# Patient Record
Sex: MALE | Race: WHITE | NOT HISPANIC OR LATINO | Employment: OTHER | URBAN - METROPOLITAN AREA
[De-identification: names, ages, dates, MRNs, and addresses within clinical notes are randomized per-mention and may not be internally consistent; named-entity substitution may affect disease eponyms.]

---

## 2012-03-14 LAB
CREAT SERPL-MCNC: 0.89 MG/DL (ref 0.72–1.25)
GLUCOSE BLD-MCNC: 95 MG/DL (ref 65–100)

## 2012-08-14 LAB
CREAT SERPL-MCNC: 0.94 MG/DL (ref 0.72–1.25)
GLUCOSE BLD-MCNC: 99 MG/DL (ref 65–100)

## 2017-01-23 ENCOUNTER — HOSPITAL ENCOUNTER (OUTPATIENT)
Dept: NUCLEAR MEDICINE | Facility: CLINIC | Age: 82
Discharge: HOME | End: 2017-01-23
Attending: INTERNAL MEDICINE

## 2017-01-23 ENCOUNTER — HOSPITAL ENCOUNTER (OUTPATIENT)
Dept: CARDIOLOGY | Facility: CLINIC | Age: 82
Discharge: HOME | End: 2017-01-23
Attending: INTERNAL MEDICINE

## 2017-01-23 DIAGNOSIS — I34.0 MR (MITRAL REGURGITATION): ICD-10-CM

## 2017-01-23 DIAGNOSIS — I25.10 ATHEROSCLEROSIS OF NATIVE CORONARY ARTERY OF NATIVE HEART WITHOUT ANGINA PECTORIS: ICD-10-CM

## 2017-01-23 DIAGNOSIS — I05.9 MITRAL VALVE DISORDER: ICD-10-CM

## 2017-01-23 DIAGNOSIS — I10 ESSENTIAL HYPERTENSION WITH GOAL BLOOD PRESSURE LESS THAN 140/90: ICD-10-CM

## 2017-01-23 LAB
CV STRESS CURRENT BP HE: NORMAL
CV STRESS CURRENT HR HE: 106
CV STRESS CURRENT HR HE: 106
CV STRESS CURRENT HR HE: 107
CV STRESS CURRENT HR HE: 114
CV STRESS CURRENT HR HE: 118
CV STRESS CURRENT HR HE: 118
CV STRESS CURRENT HR HE: 119
CV STRESS CURRENT HR HE: 121
CV STRESS CURRENT HR HE: 123
CV STRESS CURRENT HR HE: 126
CV STRESS CURRENT HR HE: 127
CV STRESS CURRENT HR HE: 129
CV STRESS CURRENT HR HE: 130
CV STRESS CURRENT HR HE: 77
CV STRESS CURRENT HR HE: 78
CV STRESS CURRENT HR HE: 78
CV STRESS CURRENT HR HE: 79
CV STRESS CURRENT HR HE: 82
CV STRESS CURRENT HR HE: 85
CV STRESS CURRENT HR HE: 86
CV STRESS CURRENT HR HE: 87
CV STRESS CURRENT HR HE: 94
CV STRESS CURRENT HR HE: 99
CV STRESS CURRENT HR HE: 99
CV STRESS DEVIATION TIME HE: NORMAL
CV STRESS EXERCISE STAGE HE: NORMAL
CV STRESS EXERCISE STAGE REACHED HE: NORMAL
CV STRESS FINAL RESTING BP HE: NORMAL
CV STRESS FINAL RESTING HR HE: 78
CV STRESS MAX HR HE: 130
CV STRESS MAX TREADMILL GRADE HE: 12
CV STRESS MAX TREADMILL SPEED HE: 2.5
CV STRESS PEAK DIA BP HE: NORMAL
CV STRESS PEAK SYS BP HE: NORMAL
CV STRESS PHASE HE: NORMAL
CV STRESS PROTOCOL HE: NORMAL
CV STRESS RESTING PT POSITION HE: NORMAL
CV STRESS RESTING PT POSITION HE: NORMAL
CV STRESS ST DEVIATION AMOUNT HE: NORMAL
CV STRESS ST DEVIATION ELEVATION HE: NORMAL
CV STRESS ST EVELATION AMOUNT HE: NORMAL
CV STRESS TEST TYPE HE: NORMAL
CV STRESS TOTAL STAGE TIME MIN 1 HE: NORMAL
NUC STRESS EJECTION FRACTION: 41 %
STRESS ECHO BASELINE BP: NORMAL
STRESS ECHO BASELINE HR: 80
STRESS ECHO CALCULATED PERCENT HR: 93
STRESS ECHO LAST STRESS BP: NORMAL
STRESS ECHO LAST STRESS HR: 129
STRESS ECHO POST ESTIMATED WORKLOAD: 6.8
STRESS ECHO POST EXERCISE DUR MIN: 4
STRESS ECHO POST EXERCISE DUR SEC: 52
STRESS ECHO TARGET HR: 118

## 2017-01-23 ASSESSMENT — MIFFLIN-ST. JEOR: SCORE: 1498.65

## 2017-01-24 LAB
AORTIC ROOT: 3.6 CM
AORTIC VALVE MEAN VELOCITY: 93.4 CM/S
AV DIMENSIONLESS INDEX VTI: 0.5
AV MEAN GRADIENT: 4 MMHG
AV PEAK GRADIENT: 7.8 MMHG
AV VALVE AREA: 1.9 CM2
AV VELOCITY RATIO: 0.6
BSA FOR ECHO PROCEDURE: 2 M2
CV BLOOD PRESSURE: NORMAL MMHG
CV ECHO HEIGHT: 68 IN
CV ECHO WEIGHT: 185 LBS
DOP CALC AO PEAK VEL: 140 CM/S
DOP CALC AO VTI: 27.8 CM
DOP CALC LVOT AREA: 3.8 CM2
DOP CALC LVOT DIAMETER: 2.2 CM
DOP CALC LVOT PEAK VEL: 78.7 CM/S
DOP CALC LVOT STROKE VOLUME: 54 CM3
DOP CALCLVOT PEAK VEL VTI: 14.2 CM
EJECTION FRACTION: 38 % (ref 55–75)
FRACTIONAL SHORTENING: 26.3 % (ref 28–44)
INTERVENTRICULAR SEPTUM IN END DIASTOLE: 1.7 CM (ref 0.6–1)
IVS/PW RATIO: 1.7
LA AREA 1: 27.2 CM2
LA AREA 2: 30.3 CM2
LEFT ATRIUM LENGTH: 6.05 CM
LEFT ATRIUM SIZE: 4.1 CM
LEFT ATRIUM VOLUME INDEX: 57.9 ML/M2
LEFT ATRIUM VOLUME: 115.8 CM3
LEFT VENTRICLE CARDIAC INDEX: 1.9 L/MIN/M2
LEFT VENTRICLE CARDIAC OUTPUT: 3.9 L/MIN
LEFT VENTRICLE DIASTOLIC VOLUME INDEX: 54 CM3/M2 (ref 34–74)
LEFT VENTRICLE DIASTOLIC VOLUME: 108 CM3 (ref 62–150)
LEFT VENTRICLE HEART RATE: 72 BPM
LEFT VENTRICLE MASS INDEX: 169.8 G/M2
LEFT VENTRICLE SYSTOLIC VOLUME INDEX: 33.5 CM3/M2 (ref 11–31)
LEFT VENTRICLE SYSTOLIC VOLUME: 67 CM3 (ref 21–61)
LEFT VENTRICULAR INTERNAL DIMENSION IN DIASTOLE: 5.7 CM (ref 4.2–5.8)
LEFT VENTRICULAR INTERNAL DIMENSION IN SYSTOLE: 4.2 CM (ref 2.5–4)
LEFT VENTRICULAR MASS: 339.6 G
LEFT VENTRICULAR OUTFLOW TRACT MEAN GRADIENT: 1 MMHG
LEFT VENTRICULAR OUTFLOW TRACT MEAN VELOCITY: 45.4 CM/S
LEFT VENTRICULAR OUTFLOW TRACT PEAK GRADIENT: 2 MMHG
LEFT VENTRICULAR POSTERIOR WALL IN END DIASTOLE: 1 CM (ref 0.6–1)
LV STROKE VOLUME INDEX: 27 ML/M2
MITRAL REGURGITANT VELOCITY TIME INTEGRAL: 188 CM
MR FLOW: 39 CM3
MR MEAN GRADIENT: 76 MMHG
MR MEAN VELOCITY: 404 CM/S
MR PEAK GRADIENT: 120.1 MMHG
MR PISA EROA: 0.2 CM2
MR PISA RADIUS: 0.9 CM
MR PISA VN NYQUIST: 23.1 CM/S
MV E'TISSUE VEL-LAT: 9.85 CM/S
MV E'TISSUE VEL-MED: 5.17 CM/S
MV PEAK A VELOCITY: 11.4 CM/S
MV REGURGITANT VOLUME: 40.3 CC
NUC REST DIASTOLIC VOLUME INDEX: 2960 LBS
NUC REST SYSTOLIC VOLUME INDEX: 68 IN
PISA MR PEAK VEL: 548 CM/S
PR MAX PG: 7 MMHG
PR PEAK VELOCITY: 138 CM/S
TRICUSPID REGURGITATION PEAK PRESSURE GRADIENT: 21.9 MMHG
TRICUSPID VALVE PEAK REGURGITANT VELOCITY: 234 CM/S

## 2017-01-26 ENCOUNTER — AMBULATORY - HEALTHEAST (OUTPATIENT)
Dept: CARDIOLOGY | Facility: CLINIC | Age: 82
End: 2017-01-26

## 2017-01-26 DIAGNOSIS — I05.0 MITRAL STENOSIS: ICD-10-CM

## 2017-02-01 ENCOUNTER — AMBULATORY - HEALTHEAST (OUTPATIENT)
Dept: CARDIOLOGY | Facility: CLINIC | Age: 82
End: 2017-02-01

## 2017-02-01 ENCOUNTER — OFFICE VISIT - HEALTHEAST (OUTPATIENT)
Dept: CARDIOLOGY | Facility: TELEHEALTH | Age: 82
End: 2017-02-01

## 2017-02-01 ENCOUNTER — COMMUNICATION - HEALTHEAST (OUTPATIENT)
Dept: CARDIOLOGY | Facility: CLINIC | Age: 82
End: 2017-02-01

## 2017-02-01 DIAGNOSIS — I05.9 MITRAL VALVE DISORDER: ICD-10-CM

## 2017-02-01 DIAGNOSIS — I10 ESSENTIAL HYPERTENSION WITH GOAL BLOOD PRESSURE LESS THAN 130/85: ICD-10-CM

## 2017-02-01 DIAGNOSIS — I25.10 ATHEROSCLEROSIS OF NATIVE CORONARY ARTERY OF NATIVE HEART WITHOUT ANGINA PECTORIS: ICD-10-CM

## 2017-02-01 DIAGNOSIS — I05.0 MITRAL STENOSIS: ICD-10-CM

## 2017-02-01 DIAGNOSIS — I25.10 CAD (CORONARY ARTERY DISEASE): ICD-10-CM

## 2017-02-01 ASSESSMENT — MIFFLIN-ST. JEOR: SCORE: 1491.86

## 2017-03-02 ENCOUNTER — COMMUNICATION - HEALTHEAST (OUTPATIENT)
Dept: CARDIOLOGY | Facility: CLINIC | Age: 82
End: 2017-03-02

## 2017-03-03 ENCOUNTER — OFFICE VISIT - RIVER FALLS (OUTPATIENT)
Dept: FAMILY MEDICINE | Facility: CLINIC | Age: 82
End: 2017-03-03
Payer: MEDICARE

## 2017-03-03 ENCOUNTER — SURGERY - HEALTHEAST (OUTPATIENT)
Dept: CARDIOLOGY | Facility: CLINIC | Age: 82
End: 2017-03-03

## 2017-03-03 ASSESSMENT — MIFFLIN-ST. JEOR: SCORE: 1512.71

## 2017-03-06 ENCOUNTER — SURGERY - HEALTHEAST (OUTPATIENT)
Dept: CARDIOLOGY | Facility: CLINIC | Age: 82
End: 2017-03-06

## 2017-03-06 ENCOUNTER — AMBULATORY - HEALTHEAST (OUTPATIENT)
Dept: CARDIOLOGY | Facility: CLINIC | Age: 82
End: 2017-03-06

## 2017-03-06 ASSESSMENT — MIFFLIN-ST. JEOR: SCORE: 1543.38

## 2017-03-08 ENCOUNTER — COMMUNICATION - HEALTHEAST (OUTPATIENT)
Dept: CARDIOLOGY | Facility: CLINIC | Age: 82
End: 2017-03-08

## 2017-03-10 ENCOUNTER — COMMUNICATION - HEALTHEAST (OUTPATIENT)
Dept: CARDIOLOGY | Facility: CLINIC | Age: 82
End: 2017-03-10

## 2017-03-10 DIAGNOSIS — I25.10 CAD (CORONARY ARTERY DISEASE): ICD-10-CM

## 2017-03-13 ENCOUNTER — AMBULATORY - HEALTHEAST (OUTPATIENT)
Dept: CARDIOLOGY | Facility: CLINIC | Age: 82
End: 2017-03-13

## 2017-03-13 DIAGNOSIS — I34.0 MITRAL REGURGITATION: ICD-10-CM

## 2017-03-13 DIAGNOSIS — I25.10 CAD (CORONARY ARTERY DISEASE): ICD-10-CM

## 2017-03-16 ENCOUNTER — AMBULATORY - HEALTHEAST (OUTPATIENT)
Dept: CARDIOLOGY | Facility: CLINIC | Age: 82
End: 2017-03-16

## 2017-03-23 ENCOUNTER — SURGERY - HEALTHEAST (OUTPATIENT)
Dept: CARDIOLOGY | Facility: CLINIC | Age: 82
End: 2017-03-23

## 2017-03-24 ENCOUNTER — COMMUNICATION - HEALTHEAST (OUTPATIENT)
Dept: CARDIOLOGY | Facility: CLINIC | Age: 82
End: 2017-03-24

## 2017-03-24 ENCOUNTER — RECORDS - HEALTHEAST (OUTPATIENT)
Dept: ADMINISTRATIVE | Facility: OTHER | Age: 82
End: 2017-03-24

## 2017-03-27 ENCOUNTER — RECORDS - HEALTHEAST (OUTPATIENT)
Dept: ADMINISTRATIVE | Facility: OTHER | Age: 82
End: 2017-03-27

## 2017-03-29 ENCOUNTER — COMMUNICATION - HEALTHEAST (OUTPATIENT)
Dept: CARDIOLOGY | Facility: CLINIC | Age: 82
End: 2017-03-29

## 2017-03-31 ENCOUNTER — OFFICE VISIT - RIVER FALLS (OUTPATIENT)
Dept: FAMILY MEDICINE | Facility: CLINIC | Age: 82
End: 2017-03-31

## 2017-03-31 ASSESSMENT — MIFFLIN-ST. JEOR: SCORE: 1529.94

## 2017-04-03 ENCOUNTER — OFFICE VISIT - RIVER FALLS (OUTPATIENT)
Dept: FAMILY MEDICINE | Facility: CLINIC | Age: 82
End: 2017-04-03

## 2017-04-12 ENCOUNTER — OFFICE VISIT - HEALTHEAST (OUTPATIENT)
Dept: CARDIOLOGY | Facility: TELEHEALTH | Age: 82
End: 2017-04-12

## 2017-04-12 DIAGNOSIS — I05.9 MITRAL VALVE DISORDER: ICD-10-CM

## 2017-04-12 DIAGNOSIS — I25.10 CORONARY ARTERY DISEASE INVOLVING NATIVE CORONARY ARTERY OF NATIVE HEART WITHOUT ANGINA PECTORIS: ICD-10-CM

## 2017-04-12 ASSESSMENT — MIFFLIN-ST. JEOR: SCORE: 1522.24

## 2017-04-17 ENCOUNTER — COMMUNICATION - HEALTHEAST (OUTPATIENT)
Dept: CARDIOLOGY | Facility: CLINIC | Age: 82
End: 2017-04-17

## 2017-06-19 ENCOUNTER — OFFICE VISIT - RIVER FALLS (OUTPATIENT)
Dept: FAMILY MEDICINE | Facility: CLINIC | Age: 82
End: 2017-06-19

## 2017-06-22 ENCOUNTER — OFFICE VISIT - RIVER FALLS (OUTPATIENT)
Dept: FAMILY MEDICINE | Facility: CLINIC | Age: 82
End: 2017-06-22

## 2017-06-22 ASSESSMENT — MIFFLIN-ST. JEOR: SCORE: 1516.33

## 2017-06-23 LAB
CREAT SERPL-MCNC: 1.13 MG/DL (ref 0.7–1.11)
GLUCOSE BLD-MCNC: 97 MG/DL (ref 65–99)

## 2017-06-27 ENCOUNTER — AMBULATORY - HEALTHEAST (OUTPATIENT)
Dept: CARDIOLOGY | Facility: CLINIC | Age: 82
End: 2017-06-27

## 2017-06-28 ENCOUNTER — AMBULATORY - HEALTHEAST (OUTPATIENT)
Dept: CARDIOLOGY | Facility: CLINIC | Age: 82
End: 2017-06-28

## 2017-06-28 ENCOUNTER — OFFICE VISIT - HEALTHEAST (OUTPATIENT)
Dept: CARDIOLOGY | Facility: TELEHEALTH | Age: 82
End: 2017-06-28

## 2017-06-28 DIAGNOSIS — I10 ESSENTIAL HYPERTENSION WITH GOAL BLOOD PRESSURE LESS THAN 140/90: ICD-10-CM

## 2017-06-28 DIAGNOSIS — I05.9 MITRAL VALVE DISORDER: ICD-10-CM

## 2017-06-28 DIAGNOSIS — I25.10 CORONARY ARTERY DISEASE INVOLVING NATIVE CORONARY ARTERY OF NATIVE HEART WITHOUT ANGINA PECTORIS: ICD-10-CM

## 2017-06-28 ASSESSMENT — MIFFLIN-ST. JEOR: SCORE: 1516.8

## 2017-06-29 ENCOUNTER — COMMUNICATION - HEALTHEAST (OUTPATIENT)
Dept: CARDIOLOGY | Facility: CLINIC | Age: 82
End: 2017-06-29

## 2017-07-10 ENCOUNTER — COMMUNICATION - HEALTHEAST (OUTPATIENT)
Dept: CARDIOLOGY | Facility: CLINIC | Age: 82
End: 2017-07-10

## 2017-07-24 ENCOUNTER — OFFICE VISIT - HEALTHEAST (OUTPATIENT)
Dept: CARDIOLOGY | Facility: CLINIC | Age: 82
End: 2017-07-24

## 2017-07-24 DIAGNOSIS — I34.0 SEVERE MITRAL REGURGITATION: ICD-10-CM

## 2017-07-24 ASSESSMENT — MIFFLIN-ST. JEOR: SCORE: 1512.26

## 2017-08-28 ENCOUNTER — OFFICE VISIT - RIVER FALLS (OUTPATIENT)
Dept: FAMILY MEDICINE | Facility: CLINIC | Age: 82
End: 2017-08-28

## 2017-08-28 ASSESSMENT — MIFFLIN-ST. JEOR: SCORE: 1516.33

## 2017-08-29 LAB
CREAT SERPL-MCNC: 1.02 MG/DL
GLUCOSE BLD-MCNC: 97 MG/DL

## 2017-09-05 ENCOUNTER — OFFICE VISIT - RIVER FALLS (OUTPATIENT)
Dept: FAMILY MEDICINE | Facility: CLINIC | Age: 82
End: 2017-09-05
Payer: MEDICARE

## 2017-09-05 ASSESSMENT — MIFFLIN-ST. JEOR: SCORE: 1527.22

## 2018-01-03 ENCOUNTER — COMMUNICATION - HEALTHEAST (OUTPATIENT)
Dept: CARDIOLOGY | Facility: CLINIC | Age: 83
End: 2018-01-03

## 2018-01-18 ENCOUNTER — COMMUNICATION - RIVER FALLS (OUTPATIENT)
Dept: FAMILY MEDICINE | Facility: CLINIC | Age: 83
End: 2018-01-18

## 2018-01-18 ENCOUNTER — OFFICE VISIT - RIVER FALLS (OUTPATIENT)
Dept: FAMILY MEDICINE | Facility: CLINIC | Age: 83
End: 2018-01-18

## 2018-01-19 LAB
CREAT SERPL-MCNC: 1.09 MG/DL (ref 0.7–1.11)
GLUCOSE BLD-MCNC: 99 MG/DL (ref 65–99)

## 2018-01-26 ENCOUNTER — AMBULATORY - RIVER FALLS (OUTPATIENT)
Dept: FAMILY MEDICINE | Facility: CLINIC | Age: 83
End: 2018-01-26

## 2018-02-01 ENCOUNTER — AMBULATORY - RIVER FALLS (OUTPATIENT)
Dept: FAMILY MEDICINE | Facility: CLINIC | Age: 83
End: 2018-02-01

## 2018-02-13 ENCOUNTER — OFFICE VISIT - RIVER FALLS (OUTPATIENT)
Dept: FAMILY MEDICINE | Facility: CLINIC | Age: 83
End: 2018-02-13

## 2018-02-13 ASSESSMENT — MIFFLIN-ST. JEOR: SCORE: 1539.01

## 2018-02-14 LAB
CREAT SERPL-MCNC: 0.97 MG/DL (ref 0.7–1.11)
GLUCOSE BLD-MCNC: 98 MG/DL (ref 65–99)

## 2018-02-28 ENCOUNTER — OFFICE VISIT - RIVER FALLS (OUTPATIENT)
Dept: FAMILY MEDICINE | Facility: CLINIC | Age: 83
End: 2018-02-28

## 2018-02-28 ENCOUNTER — AMBULATORY - RIVER FALLS (OUTPATIENT)
Dept: FAMILY MEDICINE | Facility: CLINIC | Age: 83
End: 2018-02-28

## 2018-02-28 ASSESSMENT — MIFFLIN-ST. JEOR: SCORE: 1552.62

## 2018-03-06 ENCOUNTER — OFFICE VISIT - RIVER FALLS (OUTPATIENT)
Dept: FAMILY MEDICINE | Facility: CLINIC | Age: 83
End: 2018-03-06

## 2018-03-06 ASSESSMENT — MIFFLIN-ST. JEOR: SCORE: 1557.16

## 2018-03-08 LAB — CREAT SERPL-MCNC: 1.21 MG/DL (ref 0.7–1.11)

## 2018-03-29 ENCOUNTER — OFFICE VISIT - RIVER FALLS (OUTPATIENT)
Dept: FAMILY MEDICINE | Facility: CLINIC | Age: 83
End: 2018-03-29

## 2018-03-29 ASSESSMENT — MIFFLIN-ST. JEOR: SCORE: 1543.55

## 2018-04-16 ENCOUNTER — OFFICE VISIT - RIVER FALLS (OUTPATIENT)
Dept: FAMILY MEDICINE | Facility: CLINIC | Age: 83
End: 2018-04-16

## 2018-04-16 ASSESSMENT — MIFFLIN-ST. JEOR: SCORE: 1507.26

## 2018-04-20 ENCOUNTER — OFFICE VISIT - RIVER FALLS (OUTPATIENT)
Dept: FAMILY MEDICINE | Facility: CLINIC | Age: 83
End: 2018-04-20

## 2018-04-20 ASSESSMENT — MIFFLIN-ST. JEOR: SCORE: 1498.19

## 2018-05-11 ENCOUNTER — OFFICE VISIT - RIVER FALLS (OUTPATIENT)
Dept: FAMILY MEDICINE | Facility: CLINIC | Age: 83
End: 2018-05-11

## 2018-05-11 LAB
CREAT SERPL-MCNC: 1.28 MG/DL (ref 0.72–1.25)
GLUCOSE BLD-MCNC: 116 MG/DL (ref 65–100)

## 2018-05-11 ASSESSMENT — MIFFLIN-ST. JEOR: SCORE: 1507.26

## 2018-05-18 ENCOUNTER — OFFICE VISIT - RIVER FALLS (OUTPATIENT)
Dept: FAMILY MEDICINE | Facility: CLINIC | Age: 83
End: 2018-05-18

## 2018-05-18 ASSESSMENT — MIFFLIN-ST. JEOR: SCORE: 1502.73

## 2018-06-08 LAB
CREAT SERPL-MCNC: 1.2 MG/DL
GLUCOSE BLD-MCNC: 101 MG/DL

## 2018-08-01 ENCOUNTER — RECORDS - HEALTHEAST (OUTPATIENT)
Dept: ADMINISTRATIVE | Facility: OTHER | Age: 83
End: 2018-08-01

## 2018-08-01 ENCOUNTER — AMBULATORY - HEALTHEAST (OUTPATIENT)
Dept: CARDIOLOGY | Facility: CLINIC | Age: 83
End: 2018-08-01

## 2018-08-08 ENCOUNTER — OFFICE VISIT - HEALTHEAST (OUTPATIENT)
Dept: CARDIOLOGY | Facility: TELEHEALTH | Age: 83
End: 2018-08-08

## 2018-08-08 DIAGNOSIS — I25.10 CORONARY ARTERY DISEASE INVOLVING NATIVE CORONARY ARTERY OF NATIVE HEART WITHOUT ANGINA PECTORIS: ICD-10-CM

## 2018-08-08 DIAGNOSIS — I10 ESSENTIAL HYPERTENSION: ICD-10-CM

## 2018-08-08 DIAGNOSIS — I34.0 SEVERE MITRAL REGURGITATION: ICD-10-CM

## 2018-08-08 DIAGNOSIS — I05.9 MITRAL VALVE DISORDER: ICD-10-CM

## 2018-08-08 ASSESSMENT — MIFFLIN-ST. JEOR: SCORE: 1516.8

## 2018-08-28 ENCOUNTER — OFFICE VISIT - RIVER FALLS (OUTPATIENT)
Dept: FAMILY MEDICINE | Facility: CLINIC | Age: 83
End: 2018-08-28

## 2018-08-28 LAB
CREAT SERPL-MCNC: 1.2 MG/DL (ref 0.72–1.25)
GLUCOSE BLD-MCNC: 94 MG/DL (ref 65–100)

## 2018-08-28 ASSESSMENT — MIFFLIN-ST. JEOR: SCORE: 1525.41

## 2018-09-17 ENCOUNTER — OFFICE VISIT - RIVER FALLS (OUTPATIENT)
Dept: FAMILY MEDICINE | Facility: CLINIC | Age: 83
End: 2018-09-17

## 2018-09-18 ENCOUNTER — OFFICE VISIT - RIVER FALLS (OUTPATIENT)
Dept: FAMILY MEDICINE | Facility: CLINIC | Age: 83
End: 2018-09-18

## 2018-09-18 ASSESSMENT — MIFFLIN-ST. JEOR: SCORE: 1520.87

## 2018-09-25 ENCOUNTER — RECORDS - HEALTHEAST (OUTPATIENT)
Dept: ADMINISTRATIVE | Facility: OTHER | Age: 83
End: 2018-09-25

## 2018-09-25 ENCOUNTER — AMBULATORY - HEALTHEAST (OUTPATIENT)
Dept: CARDIOLOGY | Facility: CLINIC | Age: 83
End: 2018-09-25

## 2018-09-26 ENCOUNTER — OFFICE VISIT - HEALTHEAST (OUTPATIENT)
Dept: CARDIOLOGY | Facility: TELEHEALTH | Age: 83
End: 2018-09-26

## 2018-09-26 DIAGNOSIS — I10 ESSENTIAL HYPERTENSION: ICD-10-CM

## 2018-09-26 DIAGNOSIS — I34.0 SEVERE MITRAL REGURGITATION: ICD-10-CM

## 2018-09-26 DIAGNOSIS — I05.9 MITRAL VALVE DISORDER: ICD-10-CM

## 2018-09-26 DIAGNOSIS — I25.10 CORONARY ARTERY DISEASE INVOLVING NATIVE CORONARY ARTERY OF NATIVE HEART WITHOUT ANGINA PECTORIS: ICD-10-CM

## 2018-09-26 ASSESSMENT — MIFFLIN-ST. JEOR: SCORE: 1548.55

## 2018-10-10 ENCOUNTER — COMMUNICATION - HEALTHEAST (OUTPATIENT)
Dept: ADMINISTRATIVE | Facility: CLINIC | Age: 83
End: 2018-10-10

## 2018-11-19 ENCOUNTER — OFFICE VISIT - RIVER FALLS (OUTPATIENT)
Dept: FAMILY MEDICINE | Facility: CLINIC | Age: 83
End: 2018-11-19

## 2018-12-26 ENCOUNTER — RECORDS - HEALTHEAST (OUTPATIENT)
Dept: ADMINISTRATIVE | Facility: OTHER | Age: 83
End: 2018-12-26

## 2019-01-01 ENCOUNTER — OFFICE VISIT - HEALTHEAST (OUTPATIENT)
Dept: CARDIOLOGY | Facility: CLINIC | Age: 84
End: 2019-01-01

## 2019-01-01 ENCOUNTER — HOSPITAL ENCOUNTER (OUTPATIENT)
Dept: RADIOLOGY | Facility: CLINIC | Age: 84
Discharge: HOME | End: 2019-12-16

## 2019-01-01 ENCOUNTER — AMBULATORY - HEALTHEAST (OUTPATIENT)
Dept: CARDIOLOGY | Facility: CLINIC | Age: 84
End: 2019-01-01

## 2019-01-01 ENCOUNTER — AMBULATORY - HEALTHEAST (OUTPATIENT)
Dept: CARDIAC REHAB | Facility: CLINIC | Age: 84
End: 2019-01-01

## 2019-01-01 ENCOUNTER — AMBULATORY - RIVER FALLS (OUTPATIENT)
Dept: FAMILY MEDICINE | Facility: CLINIC | Age: 84
End: 2019-01-01

## 2019-01-01 ENCOUNTER — ANESTHESIA - HEALTHEAST (OUTPATIENT)
Dept: CARDIOLOGY | Facility: CLINIC | Age: 84
End: 2019-01-01

## 2019-01-01 ENCOUNTER — COMMUNICATION - RIVER FALLS (OUTPATIENT)
Dept: FAMILY MEDICINE | Facility: CLINIC | Age: 84
End: 2019-01-01

## 2019-01-01 ENCOUNTER — SURGERY - HEALTHEAST (OUTPATIENT)
Dept: CARDIOLOGY | Facility: CLINIC | Age: 84
End: 2019-01-01

## 2019-01-01 ENCOUNTER — COMMUNICATION - HEALTHEAST (OUTPATIENT)
Dept: CARDIOLOGY | Facility: CLINIC | Age: 84
End: 2019-01-01

## 2019-01-01 DIAGNOSIS — I50.22 CHRONIC SYSTOLIC CONGESTIVE HEART FAILURE (H): ICD-10-CM

## 2019-01-01 DIAGNOSIS — I48.19 PERSISTENT ATRIAL FIBRILLATION (H): ICD-10-CM

## 2019-01-01 DIAGNOSIS — I34.0 SEVERE MITRAL REGURGITATION: ICD-10-CM

## 2019-01-01 DIAGNOSIS — I10 ESSENTIAL HYPERTENSION: ICD-10-CM

## 2019-01-01 DIAGNOSIS — I50.9 HEART FAILURE (H): ICD-10-CM

## 2019-01-01 DIAGNOSIS — I25.10 CORONARY ARTERY DISEASE INVOLVING NATIVE CORONARY ARTERY OF NATIVE HEART WITHOUT ANGINA PECTORIS: ICD-10-CM

## 2019-01-01 DIAGNOSIS — Z95.818 S/P MITRAL VALVE CLIP IMPLANTATION: ICD-10-CM

## 2019-01-01 DIAGNOSIS — I10 BENIGN ESSENTIAL HYPERTENSION: ICD-10-CM

## 2019-01-01 DIAGNOSIS — I50.43 CHF (CONGESTIVE HEART FAILURE), NYHA CLASS I, ACUTE ON CHRONIC, COMBINED (H): ICD-10-CM

## 2019-01-01 DIAGNOSIS — Z98.890 S/P MITRAL VALVE CLIP IMPLANTATION: ICD-10-CM

## 2019-01-01 LAB
ABO/RH(D): NORMAL
ALBUMIN SERPL-MCNC: 3.5 G/DL (ref 3.5–5)
ALP SERPL-CCNC: 71 U/L (ref 45–120)
ALT SERPL W P-5'-P-CCNC: 19 U/L (ref 0–45)
ANION GAP SERPL CALCULATED.3IONS-SCNC: 9 MMOL/L (ref 5–18)
ANTIBODY SCREEN: NEGATIVE
AST SERPL W P-5'-P-CCNC: 24 U/L (ref 0–40)
ATRIAL RATE - MUSE: 150 BPM
BACTERIA SPEC CULT: NORMAL
BASOPHILS # BLD AUTO: 0 THOU/UL (ref 0–0.2)
BASOPHILS NFR BLD AUTO: 1 % (ref 0–2)
BILIRUB SERPL-MCNC: 2.2 MG/DL (ref 0–1)
BNP SERPL-MCNC: 364 PG/ML (ref 0–93)
BUN SERPL-MCNC: 30 MG/DL (ref 8–28)
CALCIUM SERPL-MCNC: 9.3 MG/DL (ref 8.5–10.5)
CHLORIDE BLD-SCNC: 103 MMOL/L (ref 98–107)
CO2 SERPL-SCNC: 28 MMOL/L (ref 22–31)
CREAT SERPL-MCNC: 1.48 MG/DL (ref 0.7–1.3)
DIASTOLIC BLOOD PRESSURE - MUSE: NORMAL
EOSINOPHIL # BLD AUTO: 0.2 THOU/UL (ref 0–0.4)
EOSINOPHIL NFR BLD AUTO: 3 % (ref 0–6)
ERYTHROCYTE [DISTWIDTH] IN BLOOD BY AUTOMATED COUNT: 14.7 % (ref 11–14.5)
GFR SERPL CREATININE-BSD FRML MDRD: 45 ML/MIN/1.73M2
GLUCOSE BLD-MCNC: 138 MG/DL (ref 70–125)
HCT VFR BLD AUTO: 40.9 % (ref 40–54)
HGB BLD-MCNC: 13 G/DL (ref 14–18)
INR PPP: 1.3
INR PPP: 1.51 (ref 0.9–1.1)
INR PPP: 1.8
INTERPRETATION ECG - MUSE: NORMAL
LYMPHOCYTES # BLD AUTO: 1 THOU/UL (ref 0.8–4.4)
LYMPHOCYTES NFR BLD AUTO: 19 % (ref 20–40)
MAGNESIUM SERPL-MCNC: 2.1 MG/DL (ref 1.8–2.6)
MCH RBC QN AUTO: 30.7 PG (ref 27–34)
MCHC RBC AUTO-ENTMCNC: 31.8 G/DL (ref 32–36)
MCV RBC AUTO: 97 FL (ref 80–100)
MONOCYTES # BLD AUTO: 0.6 THOU/UL (ref 0–0.9)
MONOCYTES NFR BLD AUTO: 11 % (ref 2–10)
NEUTROPHILS # BLD AUTO: 3.5 THOU/UL (ref 2–7.7)
NEUTROPHILS NFR BLD AUTO: 65 % (ref 50–70)
P AXIS - MUSE: NORMAL
PLATELET # BLD AUTO: 163 THOU/UL (ref 140–440)
PMV BLD AUTO: 10.3 FL (ref 8.5–12.5)
POTASSIUM BLD-SCNC: 4.3 MMOL/L (ref 3.5–5)
PR INTERVAL - MUSE: NORMAL
PROT SERPL-MCNC: 7.5 G/DL (ref 6–8)
QRS DURATION - MUSE: 112 MS
QT - MUSE: 408 MS
QTC - MUSE: 499 MS
R AXIS - MUSE: -50 DEGREES
RBC # BLD AUTO: 4.23 MILL/UL (ref 4.4–6.2)
SODIUM SERPL-SCNC: 140 MMOL/L (ref 136–145)
SYSTOLIC BLOOD PRESSURE - MUSE: NORMAL
T AXIS - MUSE: 75 DEGREES
VENTRICULAR RATE- MUSE: 90 BPM
WBC: 5.3 THOU/UL (ref 4–11)

## 2019-01-01 ASSESSMENT — MIFFLIN-ST. JEOR
SCORE: 1507.73
SCORE: 1508.63
SCORE: 1487.77
SCORE: 1487.77
SCORE: 1490.94

## 2019-01-02 ENCOUNTER — OFFICE VISIT - HEALTHEAST (OUTPATIENT)
Dept: CARDIOLOGY | Facility: TELEHEALTH | Age: 84
End: 2019-01-02

## 2019-01-02 DIAGNOSIS — I25.10 CORONARY ARTERY DISEASE INVOLVING NATIVE CORONARY ARTERY OF NATIVE HEART WITHOUT ANGINA PECTORIS: ICD-10-CM

## 2019-01-02 DIAGNOSIS — I34.0 SEVERE MITRAL REGURGITATION: ICD-10-CM

## 2019-01-02 DIAGNOSIS — I10 ESSENTIAL HYPERTENSION: ICD-10-CM

## 2019-01-02 ASSESSMENT — MIFFLIN-ST. JEOR: SCORE: 1562.16

## 2019-01-03 ENCOUNTER — AMBULATORY - RIVER FALLS (OUTPATIENT)
Dept: FAMILY MEDICINE | Facility: CLINIC | Age: 84
End: 2019-01-03

## 2019-01-03 ENCOUNTER — COMMUNICATION - HEALTHEAST (OUTPATIENT)
Dept: CARDIOLOGY | Facility: CLINIC | Age: 84
End: 2019-01-03

## 2019-01-03 DIAGNOSIS — I34.0 SEVERE MITRAL INSUFFICIENCY: ICD-10-CM

## 2019-01-03 DIAGNOSIS — I50.9 CHF (CONGESTIVE HEART FAILURE) (H): ICD-10-CM

## 2019-01-03 DIAGNOSIS — R06.02 SOB (SHORTNESS OF BREATH): ICD-10-CM

## 2019-01-04 LAB
BASOPHILS # BLD MANUAL: 38 10*3/UL (ref 0–200)
BASOPHILS NFR BLD MANUAL: 0.6 %
BNP SERPL-MCNC: 551 PG/ML
BUN SERPL-MCNC: 20 MG/DL (ref 7–25)
BUN/CREAT RATIO - HISTORICAL: 17 (ref 6–22)
CALCIUM SERPL-MCNC: 9.2 MG/DL (ref 8.6–10.3)
CHLORIDE BLD-SCNC: 102 MMOL/L (ref 98–110)
CO2 SERPL-SCNC: 32 MMOL/L (ref 20–32)
CREAT SERPL-MCNC: 1.16 MG/DL (ref 0.7–1.11)
EGFRCR SERPLBLD CKD-EPI 2021: 58 ML/MIN/1.73M2
EOSINOPHIL # BLD MANUAL: 290 10*3/UL (ref 15–500)
EOSINOPHIL NFR BLD MANUAL: 4.6 %
ERYTHROCYTE [DISTWIDTH] IN BLOOD BY AUTOMATED COUNT: 12.1 % (ref 11–15)
GLUCOSE BLD-MCNC: 105 MG/DL (ref 65–99)
HCT VFR BLD AUTO: 41.4 % (ref 38.5–50)
HGB BLD-MCNC: 14.5 GM/DL (ref 13.2–17.1)
LYMPHOCYTES # BLD MANUAL: 1128 10*3/UL (ref 850–3900)
LYMPHOCYTES NFR BLD MANUAL: 17.9 %
MCH RBC QN AUTO: 32.2 PG (ref 27–33)
MCHC RBC AUTO-ENTMCNC: 35 GM/DL (ref 32–36)
MCV RBC AUTO: 92 FL (ref 80–100)
MONOCYTES # BLD MANUAL: 838 10*3/UL (ref 200–950)
MONOCYTES NFR BLD MANUAL: 13.3 %
NEUTROPHILS # BLD MANUAL: 4007 10*3/UL (ref 1500–7800)
NEUTROPHILS NFR BLD MANUAL: 63.6 %
PLATELET # BLD AUTO: 191 10*3/UL (ref 140–400)
PMV BLD: 10.6 FL (ref 7.5–12.5)
POTASSIUM BLD-SCNC: 4 MMOL/L (ref 3.5–5.3)
RBC # BLD AUTO: 4.5 10*6/UL (ref 4.2–5.8)
SODIUM SERPL-SCNC: 140 MMOL/L (ref 135–146)
WBC # BLD AUTO: 6.3 10*3/UL (ref 3.8–10.8)

## 2019-01-09 ENCOUNTER — COMMUNICATION - HEALTHEAST (OUTPATIENT)
Dept: CARDIOLOGY | Facility: CLINIC | Age: 84
End: 2019-01-09

## 2019-01-09 DIAGNOSIS — I25.10 CAD (CORONARY ARTERY DISEASE): ICD-10-CM

## 2019-01-10 ENCOUNTER — RECORDS - HEALTHEAST (OUTPATIENT)
Dept: CARDIOLOGY | Facility: CLINIC | Age: 84
End: 2019-01-10

## 2019-01-10 ENCOUNTER — AMBULATORY - HEALTHEAST (OUTPATIENT)
Dept: CARDIOLOGY | Facility: CLINIC | Age: 84
End: 2019-01-10

## 2019-01-15 ENCOUNTER — OFFICE VISIT - RIVER FALLS (OUTPATIENT)
Dept: FAMILY MEDICINE | Facility: CLINIC | Age: 84
End: 2019-01-15

## 2019-01-15 ASSESSMENT — MIFFLIN-ST. JEOR: SCORE: 1552.62

## 2019-01-21 ENCOUNTER — COMMUNICATION - HEALTHEAST (OUTPATIENT)
Dept: CARDIOLOGY | Facility: CLINIC | Age: 84
End: 2019-01-21

## 2019-01-21 DIAGNOSIS — R06.9 ABNORMALITY OF BREATHING: ICD-10-CM

## 2019-01-21 DIAGNOSIS — Z79.899 MEDICATION MANAGEMENT: ICD-10-CM

## 2019-02-05 ENCOUNTER — COMMUNICATION - RIVER FALLS (OUTPATIENT)
Dept: FAMILY MEDICINE | Facility: CLINIC | Age: 84
End: 2019-02-05

## 2019-02-05 ENCOUNTER — OFFICE VISIT - RIVER FALLS (OUTPATIENT)
Dept: FAMILY MEDICINE | Facility: CLINIC | Age: 84
End: 2019-02-05

## 2019-02-05 ASSESSMENT — MIFFLIN-ST. JEOR: SCORE: 1548.09

## 2019-02-06 ENCOUNTER — AMBULATORY - HEALTHEAST (OUTPATIENT)
Dept: CARDIOLOGY | Facility: CLINIC | Age: 84
End: 2019-02-06

## 2019-02-06 LAB
BASOPHILS # BLD MANUAL: 51 10*3/UL (ref 0–200)
BASOPHILS NFR BLD MANUAL: 1 %
BNP SERPL-MCNC: 350 PG/ML
BUN SERPL-MCNC: 17 MG/DL (ref 7–25)
BUN/CREAT RATIO - HISTORICAL: 14 (ref 6–22)
CALCIUM SERPL-MCNC: 9.2 MG/DL (ref 8.6–10.3)
CHLORIDE BLD-SCNC: 104 MMOL/L (ref 98–110)
CO2 SERPL-SCNC: 32 MMOL/L (ref 20–32)
CREAT SERPL-MCNC: 1.2 MG/DL (ref 0.7–1.11)
EGFRCR SERPLBLD CKD-EPI 2021: 56 ML/MIN/1.73M2
EOSINOPHIL # BLD MANUAL: 342 10*3/UL (ref 15–500)
EOSINOPHIL NFR BLD MANUAL: 6.7 %
ERYTHROCYTE [DISTWIDTH] IN BLOOD BY AUTOMATED COUNT: 12.6 % (ref 11–15)
GLUCOSE BLD-MCNC: 93 MG/DL (ref 65–99)
HCT VFR BLD AUTO: 40.2 % (ref 38.5–50)
HGB BLD-MCNC: 14.1 GM/DL (ref 13.2–17.1)
LYMPHOCYTES # BLD MANUAL: 974 10*3/UL (ref 850–3900)
LYMPHOCYTES NFR BLD MANUAL: 19.1 %
MCH RBC QN AUTO: 32.1 PG (ref 27–33)
MCHC RBC AUTO-ENTMCNC: 35.1 GM/DL (ref 32–36)
MCV RBC AUTO: 91.6 FL (ref 80–100)
MONOCYTES # BLD MANUAL: 796 10*3/UL (ref 200–950)
MONOCYTES NFR BLD MANUAL: 15.6 %
NEUTROPHILS # BLD MANUAL: 2938 10*3/UL (ref 1500–7800)
NEUTROPHILS NFR BLD MANUAL: 57.6 %
PLATELET # BLD AUTO: 172 10*3/UL (ref 140–400)
PMV BLD: 10.9 FL (ref 7.5–12.5)
POTASSIUM BLD-SCNC: 4.3 MMOL/L (ref 3.5–5.3)
RBC # BLD AUTO: 4.39 10*6/UL (ref 4.2–5.8)
SODIUM SERPL-SCNC: 140 MMOL/L (ref 135–146)
WBC # BLD AUTO: 5.1 10*3/UL (ref 3.8–10.8)

## 2019-02-08 ENCOUNTER — OFFICE VISIT - RIVER FALLS (OUTPATIENT)
Dept: FAMILY MEDICINE | Facility: CLINIC | Age: 84
End: 2019-02-08

## 2019-02-08 ASSESSMENT — MIFFLIN-ST. JEOR
SCORE: 1543.55
SCORE: 1543.55

## 2019-02-11 ENCOUNTER — COMMUNICATION - HEALTHEAST (OUTPATIENT)
Dept: CARDIOLOGY | Facility: CLINIC | Age: 84
End: 2019-02-11

## 2019-02-11 DIAGNOSIS — R06.02 SHORTNESS OF BREATH: ICD-10-CM

## 2019-02-11 DIAGNOSIS — I25.10 CORONARY ARTERY DISEASE INVOLVING NATIVE CORONARY ARTERY OF NATIVE HEART WITHOUT ANGINA PECTORIS: ICD-10-CM

## 2019-02-11 DIAGNOSIS — I10 ESSENTIAL HYPERTENSION: ICD-10-CM

## 2019-03-11 ENCOUNTER — OFFICE VISIT - RIVER FALLS (OUTPATIENT)
Dept: FAMILY MEDICINE | Facility: CLINIC | Age: 84
End: 2019-03-11

## 2019-03-14 ENCOUNTER — COMMUNICATION - HEALTHEAST (OUTPATIENT)
Dept: TELEHEALTH | Facility: CLINIC | Age: 84
End: 2019-03-14

## 2019-03-19 ENCOUNTER — OFFICE VISIT - RIVER FALLS (OUTPATIENT)
Dept: FAMILY MEDICINE | Facility: CLINIC | Age: 84
End: 2019-03-19

## 2019-07-03 ENCOUNTER — OFFICE VISIT - HEALTHEAST (OUTPATIENT)
Dept: CARDIOLOGY | Facility: CLINIC | Age: 84
End: 2019-07-03

## 2019-07-03 ENCOUNTER — AMBULATORY - HEALTHEAST (OUTPATIENT)
Dept: CARDIOLOGY | Facility: CLINIC | Age: 84
End: 2019-07-03

## 2019-07-03 ENCOUNTER — RECORDS - HEALTHEAST (OUTPATIENT)
Dept: ADMINISTRATIVE | Facility: OTHER | Age: 84
End: 2019-07-03

## 2019-07-03 DIAGNOSIS — I50.40 COMBINED SYSTOLIC AND DIASTOLIC CONGESTIVE HEART FAILURE (H): ICD-10-CM

## 2019-07-03 DIAGNOSIS — I25.10 CAD (CORONARY ARTERY DISEASE): ICD-10-CM

## 2019-07-03 DIAGNOSIS — I34.0 SEVERE MITRAL REGURGITATION: ICD-10-CM

## 2019-07-03 LAB
ALBUMIN SERPL-MCNC: 3.7 G/DL (ref 3.5–5)
ALP SERPL-CCNC: 63 U/L (ref 45–120)
ALT SERPL W P-5'-P-CCNC: 9 U/L (ref 0–45)
ANION GAP SERPL CALCULATED.3IONS-SCNC: 8 MMOL/L (ref 5–18)
AST SERPL W P-5'-P-CCNC: 13 U/L (ref 0–40)
BILIRUB SERPL-MCNC: 1.1 MG/DL (ref 0–1)
BUN SERPL-MCNC: 23 MG/DL (ref 8–28)
CALCIUM SERPL-MCNC: 9.3 MG/DL (ref 8.5–10.5)
CHLORIDE BLD-SCNC: 104 MMOL/L (ref 98–107)
CO2 SERPL-SCNC: 26 MMOL/L (ref 22–31)
CREAT SERPL-MCNC: 1.2 MG/DL (ref 0.7–1.3)
GFR SERPL CREATININE-BSD FRML MDRD: 58 ML/MIN/1.73M2
GLUCOSE BLD-MCNC: 95 MG/DL (ref 70–125)
HGB BLD-MCNC: 13.6 G/DL (ref 14–18)
POTASSIUM BLD-SCNC: 4 MMOL/L (ref 3.5–5)
PROT SERPL-MCNC: 7.2 G/DL (ref 6–8)
SODIUM SERPL-SCNC: 138 MMOL/L (ref 136–145)

## 2019-07-03 ASSESSMENT — MIFFLIN-ST. JEOR: SCORE: 1530.14

## 2019-07-05 LAB — BNP SERPL-MCNC: 460 PG/ML (ref 0–93)

## 2019-07-08 ENCOUNTER — COMMUNICATION - HEALTHEAST (OUTPATIENT)
Dept: CARDIOLOGY | Facility: CLINIC | Age: 84
End: 2019-07-08

## 2019-07-18 ENCOUNTER — OFFICE VISIT - HEALTHEAST (OUTPATIENT)
Dept: CARDIOLOGY | Facility: CLINIC | Age: 84
End: 2019-07-18

## 2019-07-18 DIAGNOSIS — I25.10 CORONARY ARTERY DISEASE INVOLVING NATIVE CORONARY ARTERY OF NATIVE HEART WITHOUT ANGINA PECTORIS: ICD-10-CM

## 2019-07-18 DIAGNOSIS — I50.40 COMBINED SYSTOLIC AND DIASTOLIC CONGESTIVE HEART FAILURE (H): ICD-10-CM

## 2019-07-18 DIAGNOSIS — I34.0 SEVERE MITRAL REGURGITATION: ICD-10-CM

## 2019-07-18 DIAGNOSIS — I50.42 CHRONIC COMBINED SYSTOLIC AND DIASTOLIC CONGESTIVE HEART FAILURE (H): ICD-10-CM

## 2019-07-18 ASSESSMENT — MIFFLIN-ST. JEOR: SCORE: 1532.1

## 2019-07-22 ENCOUNTER — COMMUNICATION - HEALTHEAST (OUTPATIENT)
Dept: CARDIOLOGY | Facility: CLINIC | Age: 84
End: 2019-07-22

## 2019-07-26 ENCOUNTER — COMMUNICATION - HEALTHEAST (OUTPATIENT)
Dept: CARDIOLOGY | Facility: CLINIC | Age: 84
End: 2019-07-26

## 2019-08-06 ENCOUNTER — COMMUNICATION - HEALTHEAST (OUTPATIENT)
Dept: CARDIOLOGY | Facility: CLINIC | Age: 84
End: 2019-08-06

## 2019-08-16 ENCOUNTER — OFFICE VISIT - RIVER FALLS (OUTPATIENT)
Dept: FAMILY MEDICINE | Facility: CLINIC | Age: 84
End: 2019-08-16

## 2019-08-16 ASSESSMENT — MIFFLIN-ST. JEOR: SCORE: 1539.01

## 2019-08-20 ENCOUNTER — RECORDS - HEALTHEAST (OUTPATIENT)
Dept: ADMINISTRATIVE | Facility: OTHER | Age: 84
End: 2019-08-20

## 2019-08-20 ENCOUNTER — AMBULATORY - HEALTHEAST (OUTPATIENT)
Dept: CARDIOLOGY | Facility: CLINIC | Age: 84
End: 2019-08-20

## 2019-08-28 ENCOUNTER — OFFICE VISIT - HEALTHEAST (OUTPATIENT)
Dept: CARDIOLOGY | Facility: TELEHEALTH | Age: 84
End: 2019-08-28

## 2019-08-28 DIAGNOSIS — I82.411 ACUTE DEEP VEIN THROMBOSIS (DVT) OF FEMORAL VEIN OF RIGHT LOWER EXTREMITY (H): ICD-10-CM

## 2019-08-28 DIAGNOSIS — I25.10 CORONARY ARTERY DISEASE INVOLVING NATIVE CORONARY ARTERY OF NATIVE HEART WITHOUT ANGINA PECTORIS: ICD-10-CM

## 2019-08-28 DIAGNOSIS — I50.42 CHRONIC COMBINED SYSTOLIC AND DIASTOLIC CONGESTIVE HEART FAILURE (H): ICD-10-CM

## 2019-08-28 DIAGNOSIS — I50.33 ACUTE ON CHRONIC DIASTOLIC CONGESTIVE HEART FAILURE (H): ICD-10-CM

## 2019-08-28 DIAGNOSIS — I10 ESSENTIAL HYPERTENSION: ICD-10-CM

## 2019-08-28 ASSESSMENT — MIFFLIN-ST. JEOR: SCORE: 1510

## 2019-08-30 ENCOUNTER — AMBULATORY - HEALTHEAST (OUTPATIENT)
Dept: CARDIOLOGY | Facility: CLINIC | Age: 84
End: 2019-08-30

## 2019-08-30 DIAGNOSIS — I34.0 MITRAL REGURGITATION: ICD-10-CM

## 2019-09-04 ENCOUNTER — RECORDS - HEALTHEAST (OUTPATIENT)
Dept: CARDIOLOGY | Facility: CLINIC | Age: 84
End: 2019-09-04

## 2019-09-04 ENCOUNTER — COMMUNICATION - HEALTHEAST (OUTPATIENT)
Dept: CARDIOLOGY | Facility: CLINIC | Age: 84
End: 2019-09-04

## 2019-09-06 ENCOUNTER — OFFICE VISIT - HEALTHEAST (OUTPATIENT)
Dept: CARDIOLOGY | Facility: CLINIC | Age: 84
End: 2019-09-06

## 2019-09-06 DIAGNOSIS — I34.0 SEVERE MITRAL REGURGITATION: ICD-10-CM

## 2019-09-06 DIAGNOSIS — I50.23 ACUTE ON CHRONIC SYSTOLIC HEART FAILURE (H): ICD-10-CM

## 2019-09-06 DIAGNOSIS — R06.02 SOB (SHORTNESS OF BREATH): ICD-10-CM

## 2019-09-06 ASSESSMENT — MIFFLIN-ST. JEOR: SCORE: 1525.88

## 2019-09-09 ENCOUNTER — AMBULATORY - RIVER FALLS (OUTPATIENT)
Dept: FAMILY MEDICINE | Facility: CLINIC | Age: 84
End: 2019-09-09

## 2019-09-09 ENCOUNTER — OFFICE VISIT - RIVER FALLS (OUTPATIENT)
Dept: FAMILY MEDICINE | Facility: CLINIC | Age: 84
End: 2019-09-09

## 2019-09-09 LAB — INR PPP: 2.3

## 2019-09-11 ENCOUNTER — OFFICE VISIT - RIVER FALLS (OUTPATIENT)
Dept: FAMILY MEDICINE | Facility: CLINIC | Age: 84
End: 2019-09-11

## 2019-09-11 ENCOUNTER — COMMUNICATION - HEALTHEAST (OUTPATIENT)
Dept: CARDIOLOGY | Facility: CLINIC | Age: 84
End: 2019-09-11

## 2019-09-11 ENCOUNTER — AMBULATORY - RIVER FALLS (OUTPATIENT)
Dept: FAMILY MEDICINE | Facility: CLINIC | Age: 84
End: 2019-09-11

## 2019-09-11 LAB — INR PPP: 3.5

## 2019-09-11 ASSESSMENT — MIFFLIN-ST. JEOR: SCORE: 1539.01

## 2019-09-16 ENCOUNTER — AMBULATORY - RIVER FALLS (OUTPATIENT)
Dept: FAMILY MEDICINE | Facility: CLINIC | Age: 84
End: 2019-09-16

## 2019-09-16 LAB — INR PPP: 3.7

## 2019-09-20 ENCOUNTER — AMBULATORY - RIVER FALLS (OUTPATIENT)
Dept: FAMILY MEDICINE | Facility: CLINIC | Age: 84
End: 2019-09-20

## 2019-09-20 LAB
INR PPP: 3.7
INR PPP: 3.7
PROTHROMBIN TIME: 36.2 S (ref 11.8–14.2)

## 2019-09-23 ENCOUNTER — AMBULATORY - RIVER FALLS (OUTPATIENT)
Dept: FAMILY MEDICINE | Facility: CLINIC | Age: 84
End: 2019-09-23

## 2019-09-23 LAB — INR PPP: 1.8

## 2019-09-26 ENCOUNTER — OFFICE VISIT - RIVER FALLS (OUTPATIENT)
Dept: FAMILY MEDICINE | Facility: CLINIC | Age: 84
End: 2019-09-26

## 2019-09-26 ASSESSMENT — MIFFLIN-ST. JEOR: SCORE: 1516.33

## 2019-09-30 ENCOUNTER — RECORDS - HEALTHEAST (OUTPATIENT)
Dept: ADMINISTRATIVE | Facility: OTHER | Age: 84
End: 2019-09-30

## 2019-09-30 ENCOUNTER — AMBULATORY - HEALTHEAST (OUTPATIENT)
Dept: CARDIOLOGY | Facility: CLINIC | Age: 84
End: 2019-09-30

## 2019-09-30 ENCOUNTER — AMBULATORY - RIVER FALLS (OUTPATIENT)
Dept: FAMILY MEDICINE | Facility: CLINIC | Age: 84
End: 2019-09-30

## 2019-09-30 LAB — INR PPP: 1.4

## 2019-10-01 ENCOUNTER — COMMUNICATION - RIVER FALLS (OUTPATIENT)
Dept: FAMILY MEDICINE | Facility: CLINIC | Age: 84
End: 2019-10-01

## 2019-10-01 ENCOUNTER — AMBULATORY - HEALTHEAST (OUTPATIENT)
Dept: CARDIOLOGY | Facility: CLINIC | Age: 84
End: 2019-10-01

## 2019-10-02 ENCOUNTER — OFFICE VISIT - HEALTHEAST (OUTPATIENT)
Dept: CARDIOLOGY | Facility: TELEHEALTH | Age: 84
End: 2019-10-02

## 2019-10-02 DIAGNOSIS — I34.0 SEVERE MITRAL REGURGITATION: ICD-10-CM

## 2019-10-02 DIAGNOSIS — R06.02 SOB (SHORTNESS OF BREATH): ICD-10-CM

## 2019-10-02 DIAGNOSIS — I50.22 CHRONIC SYSTOLIC HEART FAILURE (H): ICD-10-CM

## 2019-10-02 DIAGNOSIS — I10 ESSENTIAL HYPERTENSION: ICD-10-CM

## 2019-10-02 ASSESSMENT — MIFFLIN-ST. JEOR: SCORE: 1510

## 2019-10-09 ENCOUNTER — COMMUNICATION - HEALTHEAST (OUTPATIENT)
Dept: CARDIOLOGY | Facility: CLINIC | Age: 84
End: 2019-10-09

## 2019-10-09 DIAGNOSIS — I50.33 ACUTE ON CHRONIC DIASTOLIC CONGESTIVE HEART FAILURE (H): ICD-10-CM

## 2019-10-11 ENCOUNTER — COMMUNICATION - HEALTHEAST (OUTPATIENT)
Dept: CARDIOLOGY | Facility: CLINIC | Age: 84
End: 2019-10-11

## 2019-10-11 DIAGNOSIS — I50.33 ACUTE ON CHRONIC DIASTOLIC CONGESTIVE HEART FAILURE (H): ICD-10-CM

## 2019-10-15 ENCOUNTER — COMMUNICATION - HEALTHEAST (OUTPATIENT)
Dept: CARDIOLOGY | Facility: CLINIC | Age: 84
End: 2019-10-15

## 2019-10-16 ENCOUNTER — AMBULATORY - RIVER FALLS (OUTPATIENT)
Dept: FAMILY MEDICINE | Facility: CLINIC | Age: 84
End: 2019-10-16

## 2019-10-16 LAB — INR PPP: 1.8

## 2019-10-24 ENCOUNTER — AMBULATORY - HEALTHEAST (OUTPATIENT)
Dept: CARDIOLOGY | Facility: CLINIC | Age: 84
End: 2019-10-24

## 2019-10-25 ENCOUNTER — AMBULATORY - HEALTHEAST (OUTPATIENT)
Dept: CARDIOLOGY | Facility: CLINIC | Age: 84
End: 2019-10-25

## 2019-10-25 ENCOUNTER — OFFICE VISIT - HEALTHEAST (OUTPATIENT)
Dept: CARDIOLOGY | Facility: CLINIC | Age: 84
End: 2019-10-25

## 2019-10-25 DIAGNOSIS — I25.10 CAD (CORONARY ARTERY DISEASE): ICD-10-CM

## 2019-10-25 DIAGNOSIS — I34.0 SEVERE MITRAL REGURGITATION: ICD-10-CM

## 2019-10-25 DIAGNOSIS — I34.0 NONRHEUMATIC MITRAL VALVE REGURGITATION: ICD-10-CM

## 2019-10-25 DIAGNOSIS — I50.23 ACUTE ON CHRONIC SYSTOLIC CONGESTIVE HEART FAILURE (H): ICD-10-CM

## 2019-11-08 ENCOUNTER — OFFICE VISIT - HEALTHEAST (OUTPATIENT)
Dept: CARDIOLOGY | Facility: CLINIC | Age: 84
End: 2019-11-08

## 2019-11-08 ENCOUNTER — AMBULATORY - HEALTHEAST (OUTPATIENT)
Dept: CARDIOLOGY | Facility: CLINIC | Age: 84
End: 2019-11-08

## 2019-11-08 DIAGNOSIS — I34.0 SEVERE MITRAL REGURGITATION: ICD-10-CM

## 2019-11-08 DIAGNOSIS — I50.33 ACUTE ON CHRONIC DIASTOLIC CONGESTIVE HEART FAILURE (H): ICD-10-CM

## 2019-11-08 DIAGNOSIS — I10 ESSENTIAL HYPERTENSION: ICD-10-CM

## 2019-11-08 DIAGNOSIS — I50.23 ACUTE ON CHRONIC SYSTOLIC HEART FAILURE (H): ICD-10-CM

## 2019-11-08 DIAGNOSIS — I25.10 CORONARY ARTERY DISEASE INVOLVING NATIVE CORONARY ARTERY OF NATIVE HEART WITHOUT ANGINA PECTORIS: ICD-10-CM

## 2019-11-08 DIAGNOSIS — I48.19 PERSISTENT ATRIAL FIBRILLATION (H): ICD-10-CM

## 2019-11-08 LAB
ANION GAP SERPL CALCULATED.3IONS-SCNC: 9 MMOL/L (ref 5–18)
BNP SERPL-MCNC: 1642 PG/ML (ref 0–93)
BUN SERPL-MCNC: 30 MG/DL (ref 8–28)
CALCIUM SERPL-MCNC: 9.3 MG/DL (ref 8.5–10.5)
CHLORIDE BLD-SCNC: 104 MMOL/L (ref 98–107)
CO2 SERPL-SCNC: 28 MMOL/L (ref 22–31)
CREAT SERPL-MCNC: 1.48 MG/DL (ref 0.7–1.3)
GFR SERPL CREATININE-BSD FRML MDRD: 45 ML/MIN/1.73M2
GLUCOSE BLD-MCNC: 103 MG/DL (ref 70–125)
MAGNESIUM SERPL-MCNC: 2 MG/DL (ref 1.8–2.6)
POTASSIUM BLD-SCNC: 4.2 MMOL/L (ref 3.5–5)
SODIUM SERPL-SCNC: 141 MMOL/L (ref 136–145)

## 2019-11-08 ASSESSMENT — MIFFLIN-ST. JEOR
SCORE: 1534.94
SCORE: 1544.01

## 2019-11-09 ASSESSMENT — MIFFLIN-ST. JEOR: SCORE: 1502.28

## 2019-11-11 ASSESSMENT — MIFFLIN-ST. JEOR: SCORE: 1502.74

## 2019-11-12 ASSESSMENT — MIFFLIN-ST. JEOR: SCORE: 1485.5

## 2019-11-13 ENCOUNTER — SURGERY - HEALTHEAST (OUTPATIENT)
Dept: CARDIOLOGY | Facility: CLINIC | Age: 84
End: 2019-11-13

## 2019-11-13 ASSESSMENT — MIFFLIN-ST. JEOR: SCORE: 1485.5

## 2019-11-14 ASSESSMENT — MIFFLIN-ST. JEOR: SCORE: 1479.15

## 2019-11-15 ASSESSMENT — MIFFLIN-ST. JEOR: SCORE: 1470.99

## 2019-11-19 ENCOUNTER — AMBULATORY - HEALTHEAST (OUTPATIENT)
Dept: CARDIAC REHAB | Facility: CLINIC | Age: 84
End: 2019-11-19

## 2019-11-19 ENCOUNTER — COMMUNICATION - HEALTHEAST (OUTPATIENT)
Dept: CARDIOLOGY | Facility: CLINIC | Age: 84
End: 2019-11-19

## 2019-11-19 ENCOUNTER — AMBULATORY - HEALTHEAST (OUTPATIENT)
Dept: CARDIOLOGY | Facility: CLINIC | Age: 84
End: 2019-11-19

## 2019-11-19 DIAGNOSIS — I34.0 SEVERE MITRAL REGURGITATION: ICD-10-CM

## 2019-11-19 DIAGNOSIS — Z95.5 S/P DRUG ELUTING CORONARY STENT PLACEMENT: ICD-10-CM

## 2019-11-29 ENCOUNTER — OFFICE VISIT - HEALTHEAST (OUTPATIENT)
Dept: CARDIOLOGY | Facility: CLINIC | Age: 84
End: 2019-11-29

## 2019-11-29 DIAGNOSIS — I50.22 CHRONIC SYSTOLIC CONGESTIVE HEART FAILURE (H): ICD-10-CM

## 2019-11-29 DIAGNOSIS — I34.0 SEVERE MITRAL REGURGITATION: ICD-10-CM

## 2019-11-29 DIAGNOSIS — I48.21 PERMANENT ATRIAL FIBRILLATION (H): ICD-10-CM

## 2019-11-29 DIAGNOSIS — I25.10 CORONARY ARTERY DISEASE INVOLVING NATIVE CORONARY ARTERY OF NATIVE HEART WITHOUT ANGINA PECTORIS: ICD-10-CM

## 2019-11-29 LAB
ANION GAP SERPL CALCULATED.3IONS-SCNC: 5 MMOL/L (ref 5–18)
BUN SERPL-MCNC: 31 MG/DL (ref 8–28)
CALCIUM SERPL-MCNC: 9.3 MG/DL (ref 8.5–10.5)
CHLORIDE BLD-SCNC: 104 MMOL/L (ref 98–107)
CO2 SERPL-SCNC: 32 MMOL/L (ref 22–31)
CREAT SERPL-MCNC: 1.39 MG/DL (ref 0.7–1.3)
GFR SERPL CREATININE-BSD FRML MDRD: 49 ML/MIN/1.73M2
GLUCOSE BLD-MCNC: 91 MG/DL (ref 70–125)
POTASSIUM BLD-SCNC: 4.6 MMOL/L (ref 3.5–5)
SODIUM SERPL-SCNC: 141 MMOL/L (ref 136–145)

## 2019-11-29 ASSESSMENT — MIFFLIN-ST. JEOR: SCORE: 1485.05

## 2019-12-02 ENCOUNTER — OFFICE VISIT - HEALTHEAST (OUTPATIENT)
Dept: CARDIOLOGY | Facility: CLINIC | Age: 84
End: 2019-12-02

## 2019-12-02 DIAGNOSIS — I34.0 NONRHEUMATIC MITRAL VALVE REGURGITATION: ICD-10-CM

## 2019-12-03 ENCOUNTER — COMMUNICATION - HEALTHEAST (OUTPATIENT)
Dept: CARDIOLOGY | Facility: CLINIC | Age: 84
End: 2019-12-03

## 2019-12-03 DIAGNOSIS — I25.10 CAD (CORONARY ARTERY DISEASE): ICD-10-CM

## 2019-12-03 DIAGNOSIS — I34.0 SEVERE MITRAL REGURGITATION: ICD-10-CM

## 2019-12-03 DIAGNOSIS — I50.9 HEART FAILURE (H): ICD-10-CM

## 2020-01-01 ENCOUNTER — RECORDS - HEALTHEAST (OUTPATIENT)
Dept: ADMINISTRATIVE | Facility: OTHER | Age: 85
End: 2020-01-01

## 2020-01-01 ENCOUNTER — COMMUNICATION - HEALTHEAST (OUTPATIENT)
Dept: ADMINISTRATIVE | Facility: CLINIC | Age: 85
End: 2020-01-01

## 2020-01-01 ENCOUNTER — OFFICE VISIT - RIVER FALLS (OUTPATIENT)
Dept: FAMILY MEDICINE | Facility: CLINIC | Age: 85
End: 2020-01-01

## 2020-01-01 ENCOUNTER — TELEPHONE (OUTPATIENT)
Dept: PHARMACY | Facility: PHYSICIAN GROUP | Age: 85
End: 2020-01-01

## 2020-01-01 ENCOUNTER — COMMUNICATION - RIVER FALLS (OUTPATIENT)
Dept: FAMILY MEDICINE | Facility: CLINIC | Age: 85
End: 2020-01-01

## 2020-01-01 ENCOUNTER — AMBULATORY - RIVER FALLS (OUTPATIENT)
Dept: FAMILY MEDICINE | Facility: CLINIC | Age: 85
End: 2020-01-01

## 2020-01-01 ENCOUNTER — HOSPITAL ENCOUNTER (OUTPATIENT)
Dept: CARDIOLOGY | Facility: CLINIC | Age: 85
Discharge: HOME | End: 2020-01-15
Attending: INTERNAL MEDICINE

## 2020-01-01 ENCOUNTER — COMMUNICATION - HEALTHEAST (OUTPATIENT)
Dept: CARDIOLOGY | Facility: CLINIC | Age: 85
End: 2020-01-01

## 2020-01-01 ENCOUNTER — ALLIED HEALTH/NURSE VISIT (OUTPATIENT)
Dept: PHARMACY | Facility: PHYSICIAN GROUP | Age: 85
End: 2020-01-01
Payer: COMMERCIAL

## 2020-01-01 ENCOUNTER — OFFICE VISIT - HEALTHEAST (OUTPATIENT)
Dept: CARDIOLOGY | Facility: CLINIC | Age: 85
End: 2020-01-01

## 2020-01-01 ENCOUNTER — COMMUNICATION - HEALTHEAST (OUTPATIENT)
Dept: CARDIOLOGY | Facility: TELEHEALTH | Age: 85
End: 2020-01-01

## 2020-01-01 ENCOUNTER — AMBULATORY - HEALTHEAST (OUTPATIENT)
Dept: CARDIOLOGY | Facility: CLINIC | Age: 85
End: 2020-01-01

## 2020-01-01 DIAGNOSIS — I25.10 CORONARY ARTERY DISEASE INVOLVING NATIVE HEART, ANGINA PRESENCE UNSPECIFIED, UNSPECIFIED VESSEL OR LESION TYPE: ICD-10-CM

## 2020-01-01 DIAGNOSIS — I34.0 SEVERE MITRAL REGURGITATION: ICD-10-CM

## 2020-01-01 DIAGNOSIS — E78.5 DYSLIPIDEMIA: ICD-10-CM

## 2020-01-01 DIAGNOSIS — Z98.890 S/P MITRAL VALVE CLIP IMPLANTATION: ICD-10-CM

## 2020-01-01 DIAGNOSIS — I10 HYPERTENSION, UNSPECIFIED TYPE: ICD-10-CM

## 2020-01-01 DIAGNOSIS — I50.23 ACUTE ON CHRONIC SYSTOLIC HEART FAILURE (H): ICD-10-CM

## 2020-01-01 DIAGNOSIS — I34.0 NONRHEUMATIC MITRAL VALVE REGURGITATION: Primary | ICD-10-CM

## 2020-01-01 DIAGNOSIS — D64.9 ANEMIA, UNSPECIFIED TYPE: ICD-10-CM

## 2020-01-01 DIAGNOSIS — F32.A DEPRESSION, UNSPECIFIED DEPRESSION TYPE: ICD-10-CM

## 2020-01-01 DIAGNOSIS — I48.20 CHRONIC ATRIAL FIBRILLATION (H): ICD-10-CM

## 2020-01-01 DIAGNOSIS — Z95.818 S/P MITRAL VALVE CLIP IMPLANTATION: ICD-10-CM

## 2020-01-01 DIAGNOSIS — I50.22 CHRONIC SYSTOLIC HEART FAILURE (H): ICD-10-CM

## 2020-01-01 DIAGNOSIS — R52 GENERALIZED PAIN: ICD-10-CM

## 2020-01-01 DIAGNOSIS — N40.0 BENIGN PROSTATIC HYPERPLASIA, UNSPECIFIED WHETHER LOWER URINARY TRACT SYMPTOMS PRESENT: ICD-10-CM

## 2020-01-01 DIAGNOSIS — K59.00 CONSTIPATION, UNSPECIFIED CONSTIPATION TYPE: ICD-10-CM

## 2020-01-01 LAB
ANION GAP SERPL CALCULATED.3IONS-SCNC: 10 MEQ/L
ANION GAP SERPL CALCULATED.3IONS-SCNC: 11 MMOL/L (ref 5–18)
ANION GAP SERPL CALCULATED.3IONS-SCNC: 7 MMOL/L (ref 5–18)
AORTIC ROOT: 3.9 CM
BASOPHILS # BLD MANUAL: 0 CELLS/UL
BASOPHILS NFR BLD AUTO: 0.5 %
BSA FOR ECHO PROCEDURE: 2.02 M2
BUN SERPL-MCNC: 21 MG/DL (ref 8–25)
BUN SERPL-MCNC: 26 MG/DL
BUN SERPL-MCNC: 33 MG/DL (ref 8–28)
BUN/CREAT RATIO - HISTORICAL: 17
BUN/CREAT RATIO - HISTORICAL: 17 (ref 10–20)
CALCIUM SERPL-MCNC: 9.1 MG/DL (ref 8.5–10.5)
CALCIUM SERPL-MCNC: 9.3 MEQ/DL
CALCIUM SERPL-MCNC: 9.3 MG/DL (ref 8.5–10.5)
CHLORIDE BLD-SCNC: 104 MEQ/L
CHLORIDE BLD-SCNC: 104 MMOL/L (ref 98–110)
CHLORIDE BLD-SCNC: 106 MMOL/L (ref 98–107)
CO2 SERPL-SCNC: 24 MEQ/L
CO2 SERPL-SCNC: 26 MMOL/L (ref 22–31)
CO2 SERPL-SCNC: 30 MMOL/L (ref 21–31)
CREAT SERPL-MCNC: 1.24 MG/DL (ref 0.72–1.25)
CREAT SERPL-MCNC: 1.39 MG/DL (ref 0.7–1.3)
CREAT SERPL-MCNC: 1.5 MG/DL
CV BLOOD PRESSURE: ABNORMAL MMHG
CV ECHO HEIGHT: 68 IN
CV ECHO WEIGHT: 187 LBS
DOP CALC LVOT AREA: 3.8 CM2
DOP CALC LVOT DIAMETER: 2.2 CM
DOP CALC LVOT PEAK VEL: 75.2 CM/S
DOP CALC LVOT STROKE VOLUME: 47.1 CM3
DOP CALC MV VTI: 45 CM
DOP CALCLVOT PEAK VEL VTI: 12.4 CM
EJECTION FRACTION: 37 % (ref 55–75)
EOSINOPHIL # BLD MANUAL: 0.2 CELLS/UL
EOSINOPHIL NFR BLD AUTO: 2.9 %
ERYTHROCYTE [DISTWIDTH] IN BLOOD BY AUTOMATED COUNT: 14.9 % (ref 11–14.5)
ERYTHROCYTE [DISTWIDTH] IN BLOOD BY AUTOMATED COUNT: 15.9 % (ref 11.5–15.5)
ERYTHROCYTE [DISTWIDTH] IN BLOOD BY AUTOMATED COUNT: 16.4 %
FRACTIONAL SHORTENING: 23.1 % (ref 28–44)
GFR ESTIMATE EXT - HISTORICAL: 45 ML/MIN
GFR ESTIMATE EXT - HISTORICAL: 56
GFR SERPL CREATININE-BSD FRML MDRD: 49 ML/MIN/1.73M2
GLUCOSE BLD-MCNC: 117 MG/DL
GLUCOSE BLD-MCNC: 118 MG/DL (ref 65–100)
GLUCOSE BLD-MCNC: 89 MG/DL (ref 70–125)
HCT VFR BLD AUTO: 35.3 % (ref 37–53)
HCT VFR BLD AUTO: 37.6 %
HCT VFR BLD AUTO: 37.9 % (ref 40–54)
HGB BLD-MCNC: 11 G/DL (ref 13.5–17.5)
HGB BLD-MCNC: 11.9 G/DL
HGB BLD-MCNC: 12.1 G/DL (ref 14–18)
INR PPP: 1.4
INR PPP: 1.7
INR PPP: 2.1
INR PPP: 2.1
INR PPP: 2.2
INR PPP: 2.6
INR PPP: 2.7
INR PPP: 2.8
INTERVENTRICULAR SEPTUM IN END DIASTOLE: 1.56 CM (ref 0.6–1)
IVS/PW RATIO: 1.2
LA AREA 1: 44.7 CM2
LA AREA 2: 43.1 CM2
LEFT ATRIUM LENGTH: 8.02 CM
LEFT ATRIUM SIZE: 5.1 CM
LEFT ATRIUM TO AORTIC ROOT RATIO: 1.31 NO UNITS
LEFT ATRIUM VOLUME INDEX: 101.1 ML/M2
LEFT ATRIUM VOLUME: 204.2 ML
LEFT VENTRICLE CARDIAC INDEX: 2.1 L/MIN/M2
LEFT VENTRICLE CARDIAC OUTPUT: 4.1 L/MIN
LEFT VENTRICLE DIASTOLIC VOLUME INDEX: 55 CM3/M2 (ref 34–74)
LEFT VENTRICLE DIASTOLIC VOLUME: 111 CM3 (ref 62–150)
LEFT VENTRICLE HEART RATE: 88 BPM
LEFT VENTRICLE MASS INDEX: 150.3 G/M2
LEFT VENTRICLE SYSTOLIC VOLUME INDEX: 34.7 CM3/M2 (ref 11–31)
LEFT VENTRICLE SYSTOLIC VOLUME: 70 CM3 (ref 21–61)
LEFT VENTRICULAR INTERNAL DIMENSION IN DIASTOLE: 5.1 CM (ref 4.2–5.8)
LEFT VENTRICULAR INTERNAL DIMENSION IN SYSTOLE: 3.92 CM (ref 2.5–4)
LEFT VENTRICULAR MASS: 303.6 G
LEFT VENTRICULAR OUTFLOW TRACT MEAN GRADIENT: 1 MMHG
LEFT VENTRICULAR OUTFLOW TRACT MEAN VELOCITY: 51 CM/S
LEFT VENTRICULAR OUTFLOW TRACT PEAK GRADIENT: 2 MMHG
LEFT VENTRICULAR POSTERIOR WALL IN END DIASTOLE: 1.26 CM (ref 0.6–1)
LV STROKE VOLUME INDEX: 23.3 ML/M2
LYMPHOCYTES # BLD MANUAL: 1.3 CELLS/UL
LYMPHOCYTES NFR BLD AUTO: 21.5 %
MCH RBC QN AUTO: 29.2 PG
MCH RBC QN AUTO: 29.3 PG (ref 26–34)
MCH RBC QN AUTO: 31.3 PG (ref 27–34)
MCHC RBC AUTO-ENTMCNC: 31.2 G/DL (ref 32–36)
MCHC RBC AUTO-ENTMCNC: 31.6 GM/DL
MCHC RBC AUTO-ENTMCNC: 31.9 G/DL (ref 32–36)
MCV RBC AUTO: 92 FL
MCV RBC AUTO: 94 FL (ref 80–100)
MCV RBC AUTO: 98 FL (ref 80–100)
MITRAL REGURGITANT VELOCITY TIME INTEGRAL: 158 CM
MITRAL VALVE MEAN INFLOW VELOCITY: 87.1 CM/S
MITRAL VALVE PEAK VELOCITY: 208 CM/S
MONOCYTES # BLD MANUAL: 0.7 CELLS/UL
MONOCYTES NFR BLD AUTO: 12.5 %
MR FLOW: 57 CM3
MR MEAN GRADIENT: 65 MMHG
MR MEAN VELOCITY: 188 CM/S
MR PEAK GRADIENT: 91 MMHG
MR PISA EROA: 0.2 CM2
MR PISA RADIUS: 0.6 CM
MR PISA VN NYQUIST: 38.5 CM/S
MV AREA VTI: 1.05 CM2
MV DECELERATION TIME: 296 MS
MV MEAN GRADIENT: 4 MMHG
MV PEAK E VELOCITY: 168 CM/S
MV PEAK GRADIENT: 17.3 MMHG
MV REGURGITANT VOLUME: 28.8 CC
MV VALVE AREA BY CONTINUITY EQUATION: 1 CM2
NEUTROPHILS # BLD MANUAL: 3.7 CELLS/UL
NEUTROPHILS NFR BLD AUTO: 62.6 %
NUC REST DIASTOLIC VOLUME INDEX: 2992 LBS
NUC REST SYSTOLIC VOLUME INDEX: 68 IN
PISA MR PEAK VEL: 477 CM/S
PLATELET # BLD AUTO: 147 THOU/UL (ref 140–440)
PLATELET # BLD AUTO: 175 X10
PLATELET # BLD AUTO: 184 10*3/UL (ref 140–440)
PMV BLD AUTO: 10 FL (ref 8.5–12.5)
PMV BLD: 10.2 FL
PMV BLD: 9.9 FL (ref 6.5–11)
POTASSIUM BLD-SCNC: 3.8 MMOL/L (ref 3.5–5)
POTASSIUM BLD-SCNC: 4.3 MEQ/L
POTASSIUM BLD-SCNC: 4.6 MMOL/L (ref 3.5–5)
PR MAX PG: 10 MMHG
PR PEAK VELOCITY: 194 CM/S
PROTHROMBIN TIME: 23.6 S (ref 11.8–14.2)
PROTHROMBIN TIME: 24.2 S (ref 11.8–14.2)
RBC # BLD AUTO: 3.75 10*6/UL (ref 4.3–5.9)
RBC # BLD AUTO: 3.87 MILL/UL (ref 4.4–6.2)
RBC # BLD AUTO: 4.08 X10
SODIUM SERPL-SCNC: 138 MEQ/L
SODIUM SERPL-SCNC: 141 MMOL/L (ref 135–145)
SODIUM SERPL-SCNC: 143 MMOL/L (ref 136–145)
TRICUSPID REGURGITATION PEAK PRESSURE GRADIENT: 41 MMHG
TRICUSPID VALVE ANULAR PLANE SYSTOLIC EXCURSION: 1.2 CM
TRICUSPID VALVE PEAK REGURGITANT VELOCITY: 320 CM/S
WBC # BLD AUTO: 5.8 10*3/UL (ref 4.5–11)
WBC # BLD AUTO: 5.9 X10
WBC: 5.7 THOU/UL (ref 4–11)

## 2020-01-01 PROCEDURE — 99606 MTMS BY PHARM EST 15 MIN: CPT | Performed by: PHARMACIST

## 2020-01-01 PROCEDURE — 99605 MTMS BY PHARM NP 15 MIN: CPT | Performed by: PHARMACIST

## 2020-01-01 PROCEDURE — 99607 MTMS BY PHARM ADDL 15 MIN: CPT | Performed by: PHARMACIST

## 2020-01-01 RX ORDER — NITROGLYCERIN 0.4 MG/1
0.4 TABLET SUBLINGUAL EVERY 5 MIN PRN
COMMUNITY

## 2020-01-01 RX ORDER — FINASTERIDE 5 MG/1
5 TABLET, FILM COATED ORAL DAILY
COMMUNITY

## 2020-01-01 RX ORDER — CLOPIDOGREL BISULFATE 75 MG/1
75 TABLET ORAL DAILY
COMMUNITY

## 2020-01-01 RX ORDER — ISOSORBIDE DINITRATE 10 MG/1
10 TABLET ORAL 2 TIMES DAILY
COMMUNITY
End: 2020-01-01

## 2020-01-01 RX ORDER — TAMSULOSIN HYDROCHLORIDE 0.4 MG/1
0.4 CAPSULE ORAL DAILY
COMMUNITY
End: 2020-01-01

## 2020-01-01 RX ORDER — ROSUVASTATIN CALCIUM 40 MG/1
40 TABLET, COATED ORAL DAILY
COMMUNITY

## 2020-01-01 RX ORDER — POTASSIUM CHLORIDE 1500 MG/1
20 TABLET, EXTENDED RELEASE ORAL 2 TIMES DAILY
COMMUNITY

## 2020-01-01 RX ORDER — FERROUS GLUCONATE 324(37.5)
1 TABLET ORAL DAILY
COMMUNITY

## 2020-01-01 RX ORDER — HYDRALAZINE HYDROCHLORIDE 10 MG/1
10 TABLET, FILM COATED ORAL EVERY 8 HOURS
COMMUNITY
End: 2020-01-01

## 2020-01-01 RX ORDER — METOPROLOL SUCCINATE 50 MG/1
50 TABLET, EXTENDED RELEASE ORAL DAILY
COMMUNITY

## 2020-01-01 RX ORDER — LOSARTAN POTASSIUM 50 MG/1
50 TABLET ORAL DAILY
COMMUNITY

## 2020-01-01 RX ORDER — DOCUSATE SODIUM 100 MG/1
100 CAPSULE, LIQUID FILLED ORAL
COMMUNITY

## 2020-01-01 RX ORDER — BUMETANIDE 2 MG/1
2 TABLET ORAL 2 TIMES DAILY
COMMUNITY

## 2020-01-01 RX ORDER — WARFARIN SODIUM 2.5 MG/1
1 TABLET ORAL EVERY OTHER DAY
COMMUNITY

## 2020-01-01 RX ORDER — ATORVASTATIN CALCIUM 80 MG/1
80 TABLET, FILM COATED ORAL DAILY
COMMUNITY
End: 2020-01-01 | Stop reason: ALTCHOICE

## 2020-01-01 ASSESSMENT — MIFFLIN-ST. JEOR
SCORE: 1507.73
SCORE: 1502.73
SCORE: 1532.22
SCORE: 1493.65

## 2020-03-17 NOTE — LETTER
March 17, 2020      Jass Mosqueda  [address] [address] [address]        Dear Jass,     It was nice to talk with you on the phone today about your medications.  Here is the plan that we agreed upon during your visit.    1. Call and check with [facility] cardiology department ([phone]): to see if Kingsley should be making a Heart Failure clinic appt.  Per Dr. Jonah Jackson, Kingsley should make a follow-up:  - with Dr. Jackson in late April 2020  - with Dr. Lawrence in late July 2020.    2. Change tamsulosin to take once daily in the evening before bed.    3. We can explore changing to Xarelto if Kingsley meets the patient assistance program requirements. He could get the drug for free from the .  Paperwork sent.    4. Recommend drawing lipid panel (cholesterol levels) - will discuss with Berhane Krause PA-C. Homecare nurse is coming Wednesday 3/25/2020 and may be able to draw labs.    5. PharmD will address finasteride/tamsulosin.    Follow-up: Friday 3/20/2020: 10:30am. Telemedicine.    Please let me know if you have any further questions or concerns.    Sincerely,    Cat Glez, KelsyD  Medication Therapy Management (MTM) Pharmacist  [facility] (Tu/Th/Fr)  Clinic Phone: [phone]  Pager: [phone]

## 2020-03-17 NOTE — PROGRESS NOTES
MTM ENCOUNTER  SUBJECTIVE/OBJECTIVE:                           Jass Mosqueda is a 84 year old male called for an initial visit. He was referred to me from Berhane Krause PA-C. Patient was accompanied by Tammy (daughter) and Chelsy (daughter).     Chief Complaint: medication review per Berhane Krause PA-C.  Patient feeling like he has so many side effects from the medications, making him feel weak, dizzy, etc. Would like to keep his BP higher.  Tammy - daughter (POA medical #1), Chelsy - daughter (POA medical #2).  Personal Healthcare Goals: Feel less dizzy, tired lethargic during the daytime. Feels that his labile BPs cause much of this.      Allergies/ADRs: Reviewed in Epic  Tobacco:  reports that he quit smoking about 50 years ago. His smoking use included cigarettes. He has never used smokeless tobacco.  Alcohol: less than 1 shot of alcohol/day.  Mixes up a drink each evening: peppermint schnapps.  Caffeine: 1-3 cups/day of coffee; no tea or pop.  Activity: Has a walker and wheelchair.  Has a recumbent bike that he rides each day.  PMH: Reviewed in Epic    PROVIDERS:   Primary care provider: Berhane Krause PA-C [facility]  Cardiology: per pt not following with a cardiologist at this time. Did see several [facility] heart care providers (and is planning to follow-up.  Dr. Yeison Lawrence and Dr. Jonah Jackson.  After nosebleed[facility]; then went [facility], then home health care again.  [facility] care: [facility] be establishing care 4/1/2020.  [facility], but then he was living out of the mileage boundaries and stopped going there.  Planning to go back with hopes of lower cost of care.  ENT: followed here after the nosebleed.  [facility] home health care: PT, OT nursing; nurse is doing INR draws (historically has had INRs monitored at [facility]).    Medication Adherence/Access:   Patient uses pill box(es): Chelsy sets this up  weekly.  Chelsy then puts medications in the custard dish at each dosing time for him to take.  Chelsy is living with Kingsley at this time.  Patient takes medications 4 time(s) per day. Morning, noon (bumex), evening, bedtime.  Per patient, misses medication 0-1 times per week, sometimes in the evening if he falls asleep. Will then wake up again and take medication at that time.  Medication barriers: Chelsy (daughter) is picking up medication from the pharmacy - no issues there, starting to have issues with some cost - hence why he is going to pursue options through the [facility].  The patient fills medications at [place]: NO, fills medications at [facility].  Routine:  Has been drinking a lot more of the boost drinks.  Eating tires him out.  Oatmeal in the morning, boost for lunch, light supper.  Will give iceberg salad. Eats meats and potatoes. Nothing spicy, cheesy. No brocolli, eats corn, peas, beans.    Hypertension/CAD/Mitral Regurgitation/Atrial Fibrillation/HFrEF:  Cardiac history per cardiology :  Severe Mitral Regurgitation s/p Single Leaflet Device Attachment (SLDA) - (failed) at [facility] followed by a successful one here 12/19, CAD s/p prior CABG w/ L-LAD (left anterior descending artery), V-OM, V-RCA (right coronary artery) and recent PCI to RCA (right coronary artery), cardiomyopathy, atrial fibrillation, CKD (chronic kidney disease), MitraClip repair, History of DVT, repeat epistaxis, embolic cerebral infarction/airembolus(2018)  1/24/2020 cardiology appt with Dr. Jonah Jackson:  - continue clopidogrel/warfarin for at least 12 months, then can likely do ASA/warfarin  - isosorbide/hydral were stopped since patient restarted losartan.  F/u in HF clinic in 2 wks. For losartan uptitration - Per Tammy, does not remember this recommendation and has not had this appt.  F/u w/ Dr. Jackson in 3 months (April 2020) - Per Tammy, will call to schedule in 1 month.  Fu w/ Dr. Yeison Lawrence in 6 mos  (July  "2020)  ECHO: 10/18/2019: 30%  1/15/2020: EF 37%  Current Meds:  Bumetanide 2 mg: twice daily in the morning and at noon - seems that this helps with water retention, breathing (pt states that he's not breathing well - but is better).  Clopidogrel 75 mg: once daily in the morning (planning for at least 12 months from procedure).  Losartan 50 mg: once daily at bedtime  Metoprolol succinate 50 mg: once daily in the morning  Potassium chloride 20 MEQ: twice daily (morning and evening meal)  Warfarin 2.5 mg: Monday 1/2 tab (1.25 mg) and other days 1 tab (2.5 mg) once daily. When he had his nosebleed (about 6 weeks), his INR was low.  Since that time, no bleeding issues.  Nitroglycerin 0.4 mg sublingual tab: Place 1 tablet under the tongue at onset of chest pain.  May repeat x 3 doses q 5 min.  Call 911 after first dose if pain persists. Pt use: Per carlos, he did use this before, but after the last hospitalization did not get it refilled and does not have a current prescription.    Medication History: isosorbide/hydralazine (stopped 1/2020 when losartan was restarted) --updated on medication list in [facility] today, was on Xarelto but then this became too expensive, aspirin (stopped December 24, 2019)  Patient has BP monitored by homecare nursing.   BP:121/75 - feels more lethargic or that he is having cramping and believes that this is related to the lwoer BPs; feels better at 150/80s.  Feels that he is not \"up and attem\" with lower BP and wants it to be much higher.  Patient reports the following medication side effects: dizziness, swelling in ankles (much better on bumex), frequent urination, fatigue, labile BPs.  Did not discuss home weights or sodium intake due to time limitation today. Pt is not avoiding alcohol.              Dyslipidemia:  Current therapy includes atorvastatin 80 mg once daily: Per patient - was off of it for awhile and then restarted after the stroke (December 2017) 2 year ago. Per 1/24/2020 cards " "note: atorvastatin 80 was added on 1/24/2020.  Pt reports muscle aches that he attributes to the statin medication; daughter Tammy says that they are wondering whether he really needs this since his stroke happened when he was on the operating table and then are trying to improve quality of life at this time.  No 10-year ASCVD risk due to patients age and secondary prevention.  No cholesterol panel in [facility].      Pain:  Per Tammy, patient is not chronically taking any pain medication.  Has acetaminophen if needed, but has not needed this medication.  Current Meds:  Acetaminophen: not currently using.    Constipation: Pt's last BM was today: BM 1-2 times/day.  Docusate sodium 100 mg: once daily at bedtime. No concerns at this time.    Anemia: not discussed in detail today, due to time limitations.  Current Meds:  Ferrous Gluconate 324 (37.5 FE) mg: once daily      Benign Prostate Hyperplasia:  Patient has urgency with urination; does have cammode next to his chair, but will generally try to get to the bathroom.  Pt asks why he needs these medications because he didn't think there was anything wrong with his prostate. No PSA in [facility].  Tamsulosin 0.4 mg: once daily in the morning  Finasteride 5 mg: once daily in the morning.    Depression:  Current medications include: Fluoxetine 20 mg once daily in the morning. Informed Tammy that [facility] record has 40 mg/day; she affirms that he is only taking 20 mg/day.  Per patient, he has a Tickling through his whole body \"electrical circuit feeling\"  This will slowly go away in the evening. He and Tammy think it is related to the fluoxetine.  Per tammy, this was started after his stroke and she is wondering whether he really needs this medication anymore.  Did not discuss in detail today because patient fell asleep during the visit (as daughter continued talking on the phone) and due to time limitations.        Today's Vitals: There were no vitals taken for this visit. " Telemedicine due to COVID19 precautions.    ASSESSMENT:                            Medication Adherence: good, no issues identified    Renal Function:     CrCl  Cockcroft and Gault (ideal BW): 42.9 ml/min  Cockcroft and Gault (actual BW): 52.8 ml/min  Estimated GFR  Simplified 4-variable MDRD study formula: 59 ml/min/1.73m2    Hypertension/CAD/Mitral Regurgitation/Atrial Fibrillation/HFrEF:  - It appears that patient has not follow-up up with heart failure clinic as he was instructed to.  [facility]  -recommend new prescription for nitroglycerin.  -Was previously on xarelto after first mitral valve clip procedure, but was switched to warfarin due to cost of the medication.  States that they did receive 2 months of the medication at lower cost, but then the price went up.  Unclear whether he would be a candidate for a DOAC at this time since his second mitral valve clip procedure - would need to check with cardiology.    Did discuss that things seem to be stable at this time with the INR, but changing to xarelto would help decrease the amount of monitoring he needs (INR) - will need to continue to weigh risk v. Benefit (QOL) if cardiology was okay with switching.  In the meantime, reasonable to look into patient assistance program to see if patient meets qualifications and if so, could get the medication at no cost with a voucher from the .    - No BPs today, but may be able to up-titrate losartan in future.     Dyslipidemia: possible that muscle aches/pains are related to atorvastatin therapy. Per chart, patient was previously on atorvastatin 40 mg, [facility]  Would be reasonable to trial alternate statin therapy: rosuvastatin 40 mg once daily.  Recommend baseline cholesterol labs.  Long discussion with family how the statin is used to decrease the risk of future cardiac events, MI, stroke, etc.  Family would like to  stop to decrease pill burden and improve quality of life.  However, patient is indicated for secondary ASCVD prevention and until choosing palliative or hospice care, would recommend continuing some form of statin therapy to reduce the risk of further CV complications.    Pain: stable    Constipation: stable    Anemia: will address timing of ferrous gluconate dosing at follow-up visit; goal to maximize iron absorption.    Benign Prostate Hyperplasia: suboptimal dosing time - recommend changing tamsulosin to bedtime dosing to help mitigate risk of orthostatic hypotension.  -Per [facility] chart notes: 8/24/14 [place] was added to medication list. 1/18/2018 tamsulosin and finasteride were added to medication list (documented) in [facility].  Per 1/18/18 discharge summary from [facility] (page 19-20), pt had hematuria and urinary retention likely due to traumatic capps insertion and anticoagulation.  Was started on finasteride and tamsulosin, Pt was to see outpatient urology for cystoscopy (2/2/2018).  Urology notes in chart are from 9/17/18 (request to be circumcised) and 11/19/18 (follow-up).  Circumcision done on 10/1/2018.  Per urology notes, patient was only on finasteride, not tamsulosin.  -Seems that tamsulosin and finasteride were started upon discharge and would be reasonable to consider discontinuing tamsulosin and monitor for urinary symptoms - will discuss with Berhane Krause PA-C.    Depression: recommend further assessment with GDS rather than PHQ9 considering patients age.  May consider future discontinuation of serotonin specific reuptake inhibitor.    PLAN:                            1. Call and check with [facility] cardiology department ([phone]): to see if Kingsley should be making a Heart Failure clinic appt.  Per Dr. Jonah Jackson, Kingsley should make a follow-up:  - with Dr. Jackson in late April 2020  - with Dr. Lawrence in late July 2020.    2. Change tamsulosin to take once daily in the evening before  bed. This medication can cause some dizziness or low blood pressures after taking it. Therefore, it is best to take it before laying down to sleep for the night.    3. We can explore changing to Xarelto if Kingsley meets the patient assistance program requirements. He could get the drug for free from the .  This would help decrease the amount of monitoring that he would need.  We would need to check with cardiology. Paperwork sent to patient to review eligibility.    4. Recommend drawing lipid panel (cholesterol levels) - will discuss with PA: Berhane Krause PA-C. Homecare nurse is coming Wednesday 3/25/2020 and may be able to draw labs.    Recommendations to Berhane Krause PA-C:  - lipid panel  - change to rosuvastatin 40 mg  - consider stopping tamsulosin  - prescribe nitroglycerin  - consider stopping serotonin specific reuptake inhibitor in future is GDS score low.    Follow-up: Friday 3/20/2020: 10:30am. Telemedicine.  -GDS and consider stopped SSRI    I spent 60 minutes with this patient today. I offer these suggestions for consideration by Berhane Krause PA-C. A copy of the visit note was provided to the patient's primary care provider.    The patient was mailed a summary of these recommendations.    Cat Glez PharmD  Medication Therapy Management (MTM) Pharmacist  [facility] (Tu/Th/Fr)  Clinic Phone: [phone]  Pager: [phone]    Addended on March 19, 2020.  Message received from Berhane Krause PA-C:  - okay to stop tamsulosin, switch to rosuvastatin, prescribe nitroglycerin (Rx sent), stop fluoxetine (will address at follow-up), prefer not to switch to xarelto due to history of bleed on xarelto.  PharmD will move forward with changes at follow-up phone visit with pt tomorrow.

## 2020-03-19 PROBLEM — I48.20 CHRONIC ATRIAL FIBRILLATION (H): Status: ACTIVE | Noted: 2020-01-01

## 2020-03-20 NOTE — PROGRESS NOTES
MTM ENCOUNTER  SUBJECTIVE/OBJECTIVE:                           Jass Mosqueda is a 84 year old male called for a follow-up visit. He was referred to me from Berhane Krause PA-C. Tammy, daughter is also on the phone today. Today's visit is a follow-up MTM visit from 3/17/2020     Chief Complaint: follow up from last visit; feeling unwell from taking medications.  Today Tammy comments that they may explore future hospice care in the near future.  Personal Healthcare Goals: Feel less dizzy, tired lethargic during the daytime. Feels that his labile BPs cause much of this.    Allergies/ADRs: Reviewed in Epic  Tobacco:  reports that he quit smoking about 50 years ago. His smoking use included cigarettes. He has never used smokeless tobacco.  Alcohol: less than 1 shot of alcohol/day.  Mixes up a drink each evening: peppermint schnapps.  Caffeine: 1-3 cups/day of coffee; no tea or pop.  Activity: Has a walker and wheelchair.  Has a recumbent bike that he rides each day.  PMH: Reviewed in Epic     PROVIDERS:   Primary care provider: Berhane Krause PA-C [facility]  Cardiology: per pt not following with a cardiologist at this time. Did see several [facility] heart care providers (and is planning to follow-up.  Dr. Yeison Lawrence and Dr. Jonah Jackson.  After nosebleed, he started care with [facility] homecare; then went to [facility], then home health care again.  [facility] care: [facility] be establishing care 4/1/2020.  [facility], but then he was living out of the mileage boundaries and stopped going there.  Planning to go back with hopes of lower cost of care.  ENT: followed here after the nosebleed.  [facility] home health care: PT, OT nursing; nurse is doing INR draws (historically has had INRs monitored at [facility]).     Medication Adherence/Access:   Patient uses pill box(es): Set up by Chelsy his daughter who is currently living with him.  Patient takes  medications 4 time(s) per day. Morning, noon (bumex), evening, bedtime.  Per patient, misses medication 0-1 times per week, sometimes in the evening if he falls asleep. Will then wake up again and take medication at that time.  Medication barriers: Chelsy (daughter) is picking up medication from the pharmacy - no issues there, starting to have issues with some cost - hence why he is going to pursue options through the [facility].  The patient fills medications at [place]: NO, fills medications at [facility].  Routine:  Has been drinking a lot more of the boost drinks.  Eating tires him out.  Oatmeal in the morning, boost for lunch, light supper.  Will give iceberg salad. Eats meats and potatoes. Nothing spicy, cheesy. No brocolli, eats corn, peas, beans.    Hypertension/CAD/Mitral Regurgitation/Atrial Fibrillation/HFrEF:  Cardiac history per cardiology :  Severe Mitral Regurgitation s/p Single Leaflet Device Attachment (SLDA) - (failed) at [facility] followed by a successful one here 12/19, CAD s/p prior CABG w/ L-LAD (left anterior descending artery), V-OM, V-RCA (right coronary artery) and recent PCI to RCA (right coronary artery), cardiomyopathy, atrial fibrillation, CKD (chronic kidney disease), MitraClip repair, History of DVT, repeat epistaxis, embolic cerebral infarction/airembolus(2018)  1/24/2020 cardiology appt with Dr. Jonah Jackson:  - continue clopidogrel/warfarin for at least 12 months, then can likely do ASA/warfarin  - isosorbide/hydral were stopped since patient restarted losartan.  F/u in HF clinic in 2 wks. For losartan uptitration - Per Tammy, does not remember this recommendation and has not had this appt.  F/u w/ Dr. Jackson in 3 months (April 2020) - Per Tammy, will call to schedule in 1 month.  Fu w/ Dr. Yeison Lawrence in 6 mos  (July 2020)  ECHO: 10/18/2019: 30%  1/15/2020: EF 37%  Current Meds:  Bumetanide 2 mg: twice daily in the morning and at noon - seems that this helps  "with water retention, breathing (pt states that he's not breathing well - but is better).  Clopidogrel 75 mg: once daily in the morning (planning for at least 12 months from procedure).  Losartan 50 mg: once daily at bedtime  Metoprolol succinate 50 mg: once daily in the morning  Potassium chloride 20 MEQ: twice daily (morning and evening meal)  Warfarin 2.5 mg: Monday 1/2 tab (1.25 mg) and other days 1 tab (2.5 mg) once daily. When he had his nosebleed (about 6 weeks), his INR was low.  Since that time, no bleeding issues.  Nitroglycerin 0.4 mg sublingual tab: Place 1 tablet under the tongue at onset of chest pain.  May repeat x 3 doses q 5 min.  Call 911 after first dose if pain persists. Pt use: Per tammy, he did use this before, but after the last hospitalization did not get it refilled and does not have a current prescription.    Medication History: isosorbide/hydralazine (stopped 1/2020 when losartan was restarted), Xarelto (became too expensive), aspirin (stopped December 24, 2019)  Patient has BP monitored by homecare nursing.   BP:121/75 - feels more lethargic or that he is having cramping and believes that this is related to the lower BPs; feels better at 150/80s.   Patient reports the following medication side effects: dizziness, swelling in ankles (much better on bumex), frequent urination, fatigue, labile BPs.  Pt is complaining of sx of HFof: shortness of breath and dyspnea on exertion -patient states that the shortness of breath is due to taking his medications. Per Tammy, biggest shortness of breath is when he is talking.     In the middle of the night, he has a \"terrific something in my chest\" and \"I don't know what it is, but I really like it.\" \"I think it's kind of neat.\" States that this started about 1 month ago; the first time it came really strong, the one last time lasted a few minutes.  Per Tammy, his daughter she thinks it is an episode of atrial fibrillation arrhythmia.    Pt is measuring " "daily weightsand reports \"stays about the same\" 184-185 lbs.   Patient has BP monitored by [facility] nurses. Last BP check was 121/75 on Tuesday.  Pulse: 83 (after walking around); usually when resting 70-75.   Pt is not following a low sodium diet (he says \"I have one speck of salt and I cough\"), then he states that he has salt in his oatmeal, Tammy states that her sister Chelsy cooks for him and she is not a salt person, is not avoiding EtOH.                Dyslipidemia:  Current therapy includes atorvastatin 80 mg once daily: Per patient - was off of it for awhile and then restarted after the stroke (December 2017) 2 year ago. Per 1/24/2020 cards note: atorvastatin 80 was added on 1/24/2020.  Pt reports muscle aches that he attributes to the statin medication;   Patient and daughter are wondering if they can stop or switch this medication.  No 10-year ASCVD risk due to patients age and secondary prevention.  No cholesterol panel in [facility].      Anemia:  Current Meds:  Ferrous Gluconate 324 (37.5 FE) mg: once daily in the evening without food, but around supper time (maybe just before eating).  Patient stools twice daily - no blood or black in the stools.  Per Tammy, no new bruising at this time.       Benign Prostate Hyperplasia:  Patient has urgency with urination; does have cammode next to his chair, but will generally try to get to the bathroom.  Pt asks why he needs these medications because he didn't think there was anything wrong with his prostate. No PSA in [place].  Kingsley states that he drinks a couple of quarts of water/day.  He has been drinking cranberry apple juice as well which causes him to urinate.  He thinks that he goes to the bathroom 10-12 times/day.  Pt states that he feels he is able to empty his bladder completely, but then will go to the bathroom again about 30-60 minutes later.  Tamsulosin 0.4 mg: once daily in the evening - started taking this in the evening on Tuesday night.  Thinks he " "has been less dizzy since doing this.  Finasteride 5 mg: once daily in the morning.  Still would like to stop either or both of these medications.     Depression:  Current medications include: Fluoxetine 20 mg once daily in the morning.   Per patient, he has a Tickling through his whole body \"electrical circuit feeling\"  This will slowly go away in the evening. He and Tammy think it is related to the fluoxetine.    Geriatric Depression Scale:  1. Are you basically satisfied with your life? Yes  2. Have you dropped many of your activities and interests?  No  3. Do you feel that your life is empty?  No  4. Do you often get bored?  No  5. Are you in good spirits most of the time?  Yes  6. Are you afraid that something bad is going to happen to you?  No  7. Do you feel happy most of the time?  Yes  8. Do you often feel helpless?  No  9. Do you prefer to stay at home, rather than going out and doing new things?  No  10. Do you feel you have more problems with memory than most?  No  11. Do you think it is wonderful to be alive now?  Yes  12. Do you feel pretty worthless the way you are now?  Yes  13. Do you feel full of energy?  No  14. Do you feel that your situation is hopeless?  No  15. Do you think that most people are better off than you are?  No  Scoring: Depressed answers are:No on numbers 1,5,7,11,13 and Yes on others.  1-4 No cause for concern  5-9 Strong probability of depression  10+ Indicative of depression  Score TODAY (3/20/2020): 2 (number of depressed answers)    Today's Vitals: There were no vitals taken for this visit. Telemedicine due to COVID19 precautions.    ASSESSMENT:                            Medication Adherence: good, no issues identified    Hypertension/CAD/Mitral Regurgitation/Atrial Fibrillation/HFrEF: Discussed recommendations to eliminate salt from diet and limit alcohol; pt is not in agreement with these recommendations.  He feels that he has had a good life and \"do not care if I dropped " "dead.\"  Appears that pleasures things like salt and alcohol are more important to him at this point in time.  Long discussion about his blood pressure goals as patient would like his blood pressure to be in the 150s systolic.  Discussed with patient that he is high risk for another cardiac event and therefore controlling his blood pressure and his pulse with max tolerated medication doses is important as preventative therapy.  As patient does not seem to be tolerating these current doses, would be reasonable to consider blood pressure medication dose adjustment in the future, especially if patient goes to hospice care.  We will start with stopping the tamsulosin which also may be impacting his blood pressure (see below).  Discussed anticoagulation therapy with PRUDENCIO Chinchilla who prefers to stay on warfarin as patient has previously had a bleeding episode on Xarelto.  Family is in agreement with this plan.  Continue home INR monitoring with home care nurses.  Defer to PRUDENCIO Krause for assessment of a.fib events overnight.    Dyslipidemia: Recommend trialing alternate statin therapy: rosuvastatin as discussed earlier (see 3/17/2020 Pharm.D. note for details).  Care coordinator has aligned and home care cholesterol panel to be drawn next week.    Anemia: Improving.    Benign Prostate Hyperplasia: Long discussion with patient today about stopping the tamsulosin.  Reasonable to try without the medication at this point.  However discussed with family that patient may actually have more frequent urination and less ability to empty his bladder if this medication is stopped.  They feel strongly is that they like to stop this medication and are willing to monitor at home-will notify of any urination changes.    Depression: Recommend stopping the fluoxetine as patient's mood seems to be stable.  No need to decrease dose as fluoxetine has a long half-life and will self taper.    PLAN:                            1. Nolan with " [facility] will draw lipid panel on Wednesday 3/25/2020.  2. Stop atorvastatin. Switch to rosuvastatin 40 m tablet by mouth once daily.  3. Stop fluoxetine.  4. Stop tamsulosin.    I spent 40 minutes with this patient today. All changes were made via verbal approval with Berhane Krause PA-C. A copy of the visit note was provided to the patient's primary care provider.    Will follow up in 4 weeks by telephone.    The patient was mailed a summary of these recommendations.     Cat Glez, PharmD  Medication Therapy Management (MTM) Pharmacist  [facility] ()  Clinic Phone: [phone]  Pager: [phone]

## 2020-05-14 NOTE — TELEPHONE ENCOUNTER
NOTE that pt is no longer following with MTM. PCP aware.    Cat Glez PharmD  Medication Therapy Management (MTM) Pharmacist  [facility] (Tu/Th/Fr)  Clinic Phone: [phone]  Pager: [phone]      ---------------------------------------------------------------------------------------------------------------------------------    Addendum by Vince Santiago MD on April 17, 2020 2:30:13 PM CDT  ---------------------  From: Vince Santiago MD   To: Kelsy Glez Kaity;     Sent: 4/17/2020 2:30:13 PM CDT  Subject: RE: MTM appt cancelled - per pt since on hospice     Thanks        Addendum by Kelsy Glez Kaity on April 17, 2020 2:23:12 PM CDT  ---------------------  From: Kelsy Glez Kaity   To: Vince Santiago MD; Jimi MG, Berhane CRISTOBAL;     Sent: 4/17/2020 2:23:12 PM CDT  Subject: MTM appt cancelled - per pt since on hospice     Itz Javed -     I called patient for MTM visit scheduled today before I saw this message - whoops!  Apparently they called and cancelled 30 min before appt time.    Daughter informed me that he is on hospice (sounds like there had been some back and forth with this decision making) and so they cancelled MTM appointment for today.  They stated that they were working with GG.  I informed them that I am still happy to help with any medication needs or questions.  Feel free to loop me in again if needed. Otherwise, I will not plan a follow-up with pt unless another referral is made.    Thanks.  KB            ---------------------  From: Jennifer Woodward   To: Newton, Pharm D , [facility];     Sent: 4/17/2020 1:36:21 PM CDT  Subject: General Message     FAMILY CALLED AND CANCELED APPT FOR FOLLOW UP TODAY. PT IS ON HOSPICE.

## 2021-05-30 VITALS — WEIGHT: 185 LBS | HEIGHT: 68 IN | BODY MASS INDEX: 28.04 KG/M2

## 2021-05-30 VITALS — HEIGHT: 68 IN | BODY MASS INDEX: 28.82 KG/M2 | WEIGHT: 190.2 LBS

## 2021-05-30 VITALS — BODY MASS INDEX: 29.35 KG/M2 | HEIGHT: 67 IN | WEIGHT: 187 LBS

## 2021-05-30 VITALS
BODY MASS INDEX: 29.1 KG/M2 | WEIGHT: 191.4 LBS | HEIGHT: 68 IN | BODY MASS INDEX: 29.1 KG/M2 | HEIGHT: 68 IN | BODY MASS INDEX: 29.1 KG/M2

## 2021-05-30 VITALS — WEIGHT: 191.4 LBS | BODY MASS INDEX: 29.1 KG/M2

## 2021-05-30 VITALS — HEIGHT: 69 IN | WEIGHT: 191.36 LBS | BODY MASS INDEX: 28.34 KG/M2

## 2021-05-30 VITALS — HEIGHT: 68 IN | BODY MASS INDEX: 29.1 KG/M2

## 2021-05-30 NOTE — TELEPHONE ENCOUNTER
"Received call from patient daughter Tammy with status update. Tammy reports patient weights are stable and patient is doing \"much better\". Patient noted to be breathing \"a lot better\" and moving around and doing more. Patient will continue increased dose of diuretics and has f/u next week with [facility]. No further concerns voiced at this time. -ejb  "

## 2021-05-30 NOTE — TELEPHONE ENCOUNTER
Received call from observation center accepted patient into room 312 at . Called daughter and informed of process and where to arrive/room number. LEIARN

## 2021-05-30 NOTE — TELEPHONE ENCOUNTER
Called and spoke with nurse at , gave verbal report. Notified that family is aware of bed availability, room number and process for check-in. LEIA,RN

## 2021-05-30 NOTE — TELEPHONE ENCOUNTER
----- Message from Lindsey Farfan sent at 7/22/2019  8:15 AM CDT -----  Contact: Daughter  Caller: Alma Delia, daughter    Primary cardiologist: Dr. Lawrence    Detailed reason for call: Swollen, edema, exhausted, Rx not working. Please call back.     Best phone number: [phone]    Best time to contact: Today    Ok to leave a detailed message? Yes    Device? No

## 2021-05-30 NOTE — PATIENT INSTRUCTIONS - HE
Jass Mosqueda,    It was a pleasure to see you today at the [facility].     My recommendations after this visit include:    1.  Please increase your furosemide to 80 mg twice daily.    2.  Please call us on Monday to see if your water weight is down and if your breathing is any better.  If it is not we will either give you a dose of metolazone or bring you into the hospital for intravenous diuretics.        Yeison Lawrence

## 2021-05-30 NOTE — TELEPHONE ENCOUNTER
----- Message from Yeison Lawrence MD sent at 7/5/2019  4:23 PM CDT -----  BELA Capps natruretic peptide is noted.  Mild to moderate elevation.  We did increase his diuretic and he will be calling early next week to report if his symptoms are better and if his weight is down.    Thanks,    Yeison

## 2021-05-30 NOTE — TELEPHONE ENCOUNTER
Called and spoke with bed control. Started bed placement process. Spoke with [facility] verbalized direct admit to [facility] and move forward with plan of care. LEIA,RN

## 2021-05-30 NOTE — PROGRESS NOTES
Patient returns to see if increase in diuretic dose has been helpful.  He has severe mitral insufficiency and underwent mitral clip clip procedure at [facility].  There was an air embolus and neurological event which was quite dramatic and he was in the ICU for several days with neurological symptoms.  Clip did not accomplish any change in his mitral insufficiency and is actually loose and attached to only one leaflet.  He has continued severe mitral insufficiency and his functional capacity is been worsening.  He is more short of breath.  We increase his furosemide from 80 once in the morning to 80 in the morning and 40 in the evening but he has not had any positive effect in his water weight is not changed 1 pound.  He feels like he can walk a little bit better on this dose but is not significant.  He has not had any chest discomfort but he does get a fuzzy feeling.    On physical exam his jugular venous pressure is 14 to 15 cm with positive hepatojugular reflux.  There is an S1 and a 3/6 to 4/6 systolic murmur heard across the precordium but quite loud in the left lateral apical region.  Lungs have crackles at the bases.  Lower extremities have significant pretibial edema which is pitting in bilateral.    We talked about various strategies.  We spent 25 minutes greater than 50% in counseling regarding fluid retention, mitral insufficiency, and the natural history of what he has.  He has been adamant that he does not want any more procedures.    We will increase his furosemide to 80 twice daily over the weekend.  If he does not have any improvement he will call on Monday and we will offer him admission to [facility] for intravenous diuretics or 1 dose of metolazone at 5 mg which he would take in addition to the furosemide that he is already on.  If that one dose did not show any improvement then we would again offer him a hospital stay.    I did have a discussion was quite blunt about the natural  history of severe mitral insufficiency and congestive heart failure.  I did tell he and his daughter that at some point in time the medications will not work to get rid of fluid.  He is a very active and vigorous lifestyle historically.  We talked about the possibility of hospice and comfort care but is nowhere near wanting to consider that at this point.  We did introduce that topic however.    Thank you for allowing us to participate in his care.

## 2021-05-30 NOTE — PATIENT INSTRUCTIONS - HE
Jass Mosqueda,    It was a pleasure to see you today at the [facility].     My recommendations after this visit include:    1.  Please increase your furosemide to 80 mg in the morning and 40 mg in the afternoon.  We are hoping that this will help relieve some of the additional fluid in your system and therefore help your shortness of breath.  It    2.  Do not add salt to any of your food and avoid salty foods.  This will help get rid of extra fluid.  When you eat salt you are retaining fluid.    3.  We will check blood work today and call with the results.    4.  Please weigh yourself on a daily basis.  Weigh yourself in the morning after you first get up and record the weight in the same clothing or no clothing.  We want to know how your weight response to the increase water pills.    5.  Please call Génesis on Monday to tell her what your water weight is and how your breathing is doing.  Her phone number is [phone]    6.  We will try to get an appointment for you for a follow-up visit either next week or the week after.      eYison Lawrence

## 2021-05-30 NOTE — TELEPHONE ENCOUNTER
Called and spoke with daughter. Diuretic increase not effective. She reports her dad is so miserable, his face is even puffy. She is calling in to update and request what was discussed in clinic, admission to  for IV diuretic therapy. States that a sister is available to take him anytime today. LEIA,RN

## 2021-05-31 VITALS — WEIGHT: 189 LBS | BODY MASS INDEX: 28.64 KG/M2 | HEIGHT: 68 IN

## 2021-05-31 VITALS — WEIGHT: 188 LBS | HEIGHT: 68 IN | BODY MASS INDEX: 28.49 KG/M2

## 2021-05-31 NOTE — TELEPHONE ENCOUNTER
Called patient daughter, Myrna, to help arrange sooner f/u with [facility]. Transfer to [facility] -- patient appointment moved to 8/28/19 -ramsey.

## 2021-05-31 NOTE — TELEPHONE ENCOUNTER
Dr. Lawrence,     As you may be aware, patient recently hospitalized at [facility] for CHF exacerbation. Per patient daughter, he needs some teeth extracted but was recently started on Xarelto anti-coagulation for paroxysmal atrial fibrillation and LE DVT. She wonders when he will be able to safely hold this medication prior to extraction. Please advise. -ejb

## 2021-05-31 NOTE — PROGRESS NOTES
I spoke with the patient. He agrees to proceed with DC and FU with Dr. Navarrete. He request for us to call [facility] to make those appointments. We are closed on Monday for the holiday. I will have our  call jordan Norris on Tuesday.  Ruddy Finnegan RN 8/30/19 4:01 PM      ----- Message ----------------------------------------------   From: Yeison Lawrence MD   Sent: 8/29/2019  12:46 PM   To: Diaz Navarrete MD, Ruddy Finnegan, RN, *     Yes, I think he would agree to that.     Yeison Nevarez   ----- Message ------------------------------------------------   From: Diaz Navarrete MD   Sent: 8/28/2019   2:21 PM   To: Yeison Lawrence MD, Ruddy Finnegan, RN, *     Would be happy to see him Yeison.     Is it ok if we get a DC prior to the clinic visit? Based on the recent TTE, this should be amenable to repair with another Clip, but the DC will help confirm that.     Thank you!     Diaz     ----- Message --------------------------------------------------   From: Yeison Lawrence MD   Sent: 8/28/2019  10:16 AM   To: Diaz Navarrete MD, Ruddy Finnegan, RN, *     Jose Perales nd     I would like this patient to see you for the possibility of a mitral valve clip.  He had an attempted procedure at [facility] sometime ago and the records are all in his current chart.  Had a large air embolus during the procedure and had a very prolonged course and the clip did not work and he is left with a flail leaflet and severe mitral insufficiency.  He did not have any interest in repeating any kind of procedures but now is having second thoughts and would like to explore the possibility of another try to clip if this was technically feasible.  Could you look at his imaging studies and schedule him in the valve clinic to visit about this.  He has a wonderful family and usually 2 of his daughters come with him to the visits.     Thanks,     Yeison

## 2021-05-31 NOTE — PROGRESS NOTES
Today we reviewed the results of his recent hospitalization.  He was able to lose 11 pounds.  He does feel better but his breathing with activity is not much better with the weight loss.  He still has significant dyspnea on exertion.    His lungs are clear today but his venous pressure is elevated at 14 cm with positive hepatojugular reflux.    He is not wild about taking the Xarelto because of the price but is not been having any bleeding issues.  He will check out some other options.    He is open to the idea of a possible second mitral valve clip procedure and I will ask our structural heart team to review his chart and visit with him in the clinic along with his daughters.    Vital signs are reviewed, lungs are clear with increased jugular venous pressure and mild bilateral pretibial edema.  Her graph 25 minutes spent today primarily in counseling regarding mitral valve clip, mitral insufficiency, functional capacity with associated severe mitral insufficiency.

## 2021-05-31 NOTE — TELEPHONE ENCOUNTER
Spoke to patient daughter and reviewed recommendation re: kelvin. Myrna verbalized understanding.    Myrna was also wondering about patient being seen for consideration of mitral valve clip. Recommendations when he left [facility] were to f/u with CHF nurse and with [facility]. Myrna stated that they'd rather wait for f/u with [facility] in [place]. This has been arranged but not until September.     Dr. Lawrence, should patient be seeing Valve Clinic? Please advise. -ejb  ----- Message -----  From: Yeison Lawrence MD  Sent: 8/7/2019   1:57 PM  To: Génesis Burger, RN    2 months out from DVT and last primary care doctor thinks less time as needed.

## 2021-05-31 NOTE — TELEPHONE ENCOUNTER
----- Message from Ni Ram sent at 8/6/2019  2:18 PM CDT -----  Contact: Pt's daughter  General phone call:    Caller: Pt's daughter  Primary cardiologist: Howard  Detailed reason for call: Pt is sched to have 2 teeth extracted on 8/12- daughter wants to check if this ok with meds, etc.  Also, ques re HF clinic, etc...  New or active symptoms?   Best phone number: Hermes Norris @ [phone]  Best time to contact:   Ok to leave a detailed message?   Device?     Additional Info:

## 2021-05-31 NOTE — TELEPHONE ENCOUNTER
----- Message -----  From: Yeison Lawrence MD  Sent: 8/12/2019   5:36 PM  To: Génesis Burger RN    Could I see him sooner in [place], could squeeze him in if needed.    Thanks,     Yeison

## 2021-06-01 VITALS — WEIGHT: 189 LBS | HEIGHT: 68 IN | BODY MASS INDEX: 28.64 KG/M2

## 2021-06-01 NOTE — TELEPHONE ENCOUNTER
Update: Patient in ED at [facility] receiving IV Lasix and Diltiazem. Daughter's requested update be sent to Dr. Lawrence.     FYI to Valve Clinic RNs.

## 2021-06-01 NOTE — PATIENT INSTRUCTIONS - HE
Jass Mosqueda,    It was a pleasure to see you today at the [facility].     My recommendations after this visit include:  - No changes to your medications.    - Continue to monitor for signs of retaining fluid (increasing weights, shortness of breath, swelling) and call with any concerns. [phone]    Britney De Paz CNP    What is the [facility]?     The [facility] is a heart failure specialty clinic within [facility].  You will work with your cardiologist, nurse practitioner, and nurses to carefully adjust medications and learn how to live with heart failure.  The Heart Failure Program will help you:      Better understand your chronic heart condition    Feel better and avoid hospital stays    Monitoring for Symptoms      Call the Heart Failure Phone Line ([phone]) if you have any of these symptoms:     Increased shortness of breath/shortness of breath at rest    Waking up at night with difficulty breathing    Unable to lie down for sleep due to symptoms or needing to sit upright for sleep    Weight gain of 2 pounds a day for 2 days in a row OR 5 pounds in 1 week    Increased swelling in your ankles or legs    Dizziness or lightheadedness    Medications       Take your medications as prescribed    Bring all your medications in their original bottles to every appointment    Avoid non-steroidal anti-inflammatory medications (Advil, Aleve, Ibuprofen, Naprosyn, Naproxen, Celebrex)    Do not stop taking your medications or begin taking over-the-counter or herbal medications without first talking to your doctor or nurse practitioner    Diet and Lifestyle       Limit sodium/salt to 2000 mg daily   o Read food labels for sodium content  o Do not add salt when cooking or add salt at the table    Weigh yourself every day and record in your daily weight log   o Call if you gain 2 pounds a day for 2 days in a row OR 5 pounds in 1  week  o Bring daily weight log to every appointment    Stay active, pace yourself, listen to your body, and rest when tired    Elevate your legs if they are swollen. Ask about using compression/support stockings    Stop smoking    Lose weight if you are overweight    Avoid drinking alcohol or limit amount    Stay updated on your immunizations including flu and pneumonia vaccines

## 2021-06-01 NOTE — TELEPHONE ENCOUNTER
----- Message from Britney De Paz CNP sent at 9/6/2019  9:28 AM CDT -----  Dr. Lawrence patient.  He is going to see Dr. Navarrete in October for possible mitraclip. Patient needs 2 teeth pulled prior to the procedure. Family says oral surgeon needs MD approval.  Can you please call oral surgeon to get details of procedure and what they need from Dr. Lawrence to approve?  Appointment with oral surgeon is in 2 weeks so not urgent.     [facility]  [phone]    Thanks

## 2021-06-01 NOTE — TELEPHONE ENCOUNTER
Call placed to oral surgeon's office. Patient having two teeth extracted using local anesthetic on 9/23/19. Oral surgeon does not require patient to hold warfarin, but patient needs INR with in 24 hours or procedure.    Per conversation with Dr. Lawrence, patient okay to proceed with extraction and can hold warfarin for one day prior to procedure.     Return call placed to Oral Surgeon's office. They are requesting clearance to be sent via fax. Letter will be generated and faxed to [phone]. -b

## 2021-06-01 NOTE — PROGRESS NOTES
Patient here for follow-up of reduced left ventricular systolic function, severe mitral insufficiency.  Please see prior notes for details but he had a failed attempt at a mitral clip in [place] and the procedure was complicated by air embolus and prolonged hospitalization.    He has been doing reasonably well since discharge from the hospital was not complained of changes in his functional capacity.  He does have shortness of breath with mild activity.  He has not had any chest discomfort or syncope.  He is scheduled to see the valve clinic team later this month.  He is eager to know if he would be a candidate for repeat procedure.    Lungs are clear to auscultation.  On cardiovascular exam the jugular venous pressure is 12 cm with positive hepatojugular reflux.  There is an S1 and 3/6 to 4/6 holosystolic murmur heard across the precordium but best at the left lower sternal border.  Lower extremities showed pretibial pitting edema on the right which is more prominent by far than the left.    I have not change his medications.  I have asked him to weigh himself daily which he is not doing.  I have asked him to call if his weight goes up by more than 3 or 4 pounds in a day.  I have asked him to reduce his sodium as he still puts salt on his oatmeal in the morning.  I am not sure he will follow either of these instructions but I pointed out that the goal is to help his breathing and keep him from needing hospitalization which I know he despises.    I will see him in follow-up after he is evaluated the valve clinic and hopefully after a successful mitral clip.    Thank you for allowing us to participate in his care.

## 2021-06-01 NOTE — TELEPHONE ENCOUNTER
----- Message from Ni Ram sent at 9/4/2019  8:40 AM CDT -----  General phone call:    Caller: Pt's daughter  Primary cardiologist: Howard  Detailed reason for call: Daughter states pt is not doing well- a lot of chest pain and shortness of breath- feels this is urgent  New or active symptoms? yes  Best phone number: Hermse Norris @ [phone]  Best time to contact:   Ok to leave a detailed message?   Device? No    Additional Info:

## 2021-06-01 NOTE — TELEPHONE ENCOUNTER
Dr. Lawrence,    Patient being switched from Xarelto to warfarin during his hospitalization and patient wanted to be sure you approved. Please advise. -darynb

## 2021-06-02 VITALS — WEIGHT: 199 LBS | BODY MASS INDEX: 30.16 KG/M2 | HEIGHT: 68 IN

## 2021-06-02 VITALS — WEIGHT: 196 LBS | HEIGHT: 68 IN | BODY MASS INDEX: 29.7 KG/M2

## 2021-06-02 NOTE — TELEPHONE ENCOUNTER
Call placed to patient. Patient notes increased shortness of breath over last 3 days. He is audibly short of breath while speaking, having to pause frequently to take breaths. Patient notes he was unable to sleep through the night because he was unable to lie flat like he normally does. Today, he had to take frequent breaks while lighting the fire to heat his house-- which he states he is normally able to do without issue.    Patient notes he has been weighing daily-- his weight today report at 190 lbs-- 2 lbs above his baseline. He has no increase in LE swelling, denies chest pain or palpitations. Patient does note that he missed his afternoon dose of Furosemide yesterday. He also states he feels like he is not urinating as much as he normally would while taking Lasix 80 mg two times a day.    Patient advised he should seek evaluation in ED given the severity of his symptoms but he adamantly refuses. After discussion with Dr. Lawrence, received verbal order for extra dose of Lasix 120 mg to be given now. Reviewed this recommendation with patient and daughter Alma Delia-- both verbalize understanding and agreement with recommendation. If patient does not have some improvement in symptoms with extra dose of Lasix, patient reluctantly agrees to be seen in the ED. -ashley

## 2021-06-02 NOTE — TELEPHONE ENCOUNTER
----- Message from Jesus Alberto Harding sent at 10/15/2019 11:01 AM CDT -----  Regarding: Pt concerns  General phone call:    Caller: Pts daughter - Alma Delia    Primary cardiologist: Dr Lawrence     Detailed reason for call: Pt not breathing well - Can't catch breath - couldn't sleep - daughter worries - Did   New or active symptoms? new  Best phone number: [phone]  Best time to contact: asap if possible    Ok to leave a detailed message? Yes    Device? No    Additional Info:

## 2021-06-02 NOTE — TELEPHONE ENCOUNTER
Patient daughter, Alma Delia, reports that although patient seems to be loosing fluid weight, breathing has not improved. Advised Alma Delia that recommendations on Tuesday were for patient to be evaluated in ED. If patient agreeable, plan should be the same. Alma Delia verbalized understanding and reports patient now feels he should be seen in ED as well and they plan to take patient to  ED. -ashley

## 2021-06-02 NOTE — PROGRESS NOTES
[facility] [place] Note    Assessment / Plan:    Mr Mosqueda is presenting with progressive dyspnea on exertion and worsening congestive heart failure.  He is known to have severe mitral regurgitation, and had an unsuccessful attempt at transcatheter mitral valve repair using MitraClip at [facility] approximately 2 years ago.  The procedure was also complicated by an air embolism, resulting in CVA.    He has no significant sequelae from the CVA, but due to his worsening symptoms, is interested in another attempt at transcatheter mitral valve repair.  As outlined below, it was explained that a repeat MitraClip procedure would be reasonable to attempt, but would be challenging with an uncertain outcome.  The risks, benefits alternatives were reviewed, and he would like to proceed.    He would also benefit from PCI of his RCA and circumflex and this will be scheduled in the near future.      Thank you for the opportunity to participate in the care of Jass Mosqueda. Please do not hesitate to call with any questions or concerns regarding his cardiovascular status.    ______________________________________________________________________    Subjective:    It was a pleasure to see Jass Mosqueda at the [facility] at the request of Dr Lawrence for evaluation of treatment options for severe mitral regurgitation and progressive congestive heart failure.     Jass Mosqueda is a pleasant 84 y.o. male with established coronary artery disease, having had bypass surgery in 2012, receiving a LIMA to the LAD and a vein graft to a ramus.    He was seen in March 2017 in our valve clinic because of severe mitral regurgitation that was causing increasing dyspnea on exertion.  As part of his preoperative evaluation, he underwent coronary angiography that showed a patent LIMA to the LAD as well as a patent vein graft to the ramus.  The mid RCA had an 80% stenosis as to the mid  circumflex, and prior to his PCI, it was thought that he would benefit from completing his dental work so he could be on dual antiplatelet therapy without interruption.    He decided to seek another opinion at [facility] after that, and underwent the MitraClip procedure.  It appears that one clip was deployed, and there was still residual significant mitral regurgitation noted but a second clip could not be successfully deployed.  He also had a significant air embolism during the procedure resulting in a CVA which required prolonged hospitalization.    He has now recovered from that, but now has progressive congestive heart failure, including a recent hospitalization requiring intravenous diuretics.    He remains a poor candidate for surgical mitral valve repair with a replacement, and it would be reasonable to attempt another clip for the residual mitral regurgitation.  It was explained however that this would be a challenging procedure with an uncertain outcome.  Given his progressive symptoms, he would like to proceed.    He will also benefit from PCI of his RCA and circumflex, and will be scheduled for that in the near future.  ______________________________________________________________________    Problem List:  Patient Active Problem List   Diagnosis     Hyperlipidemia     Essential hypertension     CAD (coronary artery disease)     Rodrigues's esophagus     Severe mitral regurgitation     Heart failure with reduced ejection fraction (H)     Acute on chronic systolic heart failure (H)     Persistent atrial fibrillation     DNAR (do not attempt resuscitation)       Medical History:  Past Medical History:   Diagnosis Date     Acute deep vein thrombosis (DVT) of femoral vein of right lower extremity (H)      Acute deep vein thrombosis (DVT) of popliteal vein of right lower extremity (H)      Acute myocardial infarction (H) 11/10/2007     Acute posthemorrhagic anemia 11/10/2007     JANELLE (acute kidney  injury) (H) 2017     Atrial fibrillation (H)      Combined forms of age-related cataract 2017     Coronary artery disease      DNAR (do not attempt resuscitation)      Embolic cerebral infarction (H) 2017     Hypertension      Mitral insufficiency      Myocardial infarction (H)        Surgical History:  Past Surgical History:   Procedure Laterality Date     CARDIAC CATHETERIZATION N/A 3/6/2017    Coronary Angiogram; Diaz Navarrete MD; [facility]     CARDIAC CATHETERIZATION  2017     CORONARY ANGIOPLASTY      mid LCX     CORONARY ARTERY BYPASS GRAFT      LIMA to LAD, SVG to Intermediate     CORONARY STENT PLACEMENT         Social History:  Social History     Socioeconomic History     Marital status:      Spouse name: Not on file     Number of children: Not on file     Years of education: Not on file     Highest education level: Not on file   Occupational History     Employer: RETIRED   Social Needs     Financial resource strain: Not on file     Food insecurity:     Worry: Not on file     Inability: Not on file     Transportation needs:     Medical: Not on file     Non-medical: Not on file   Tobacco Use     Smoking status: Former Smoker     Last attempt to quit: 1960     Years since quittin.8     Smokeless tobacco: Never Used   Substance and Sexual Activity     Alcohol use: Yes     Alcohol/week: 2.0 standard drinks     Types: 1 Glasses of wine, 1 Shots of liquor per week     Drinks per session: 1 or 2     Comment: daily every evening     Drug use: Never     Sexual activity: Yes     Partners: Female   Lifestyle     Physical activity:     Days per week: Not on file     Minutes per session: Not on file     Stress: Not on file   Relationships     Social connections:     Talks on phone: Not on file     Gets together: Not on file     Attends Mandaen service: Not on file     Active member of club or organization: Not on file     Attends meetings of clubs or  organizations: Not on file     Relationship status: Not on file     Intimate partner violence:     Fear of current or ex partner: Not on file     Emotionally abused: Not on file     Physically abused: Not on file     Forced sexual activity: Not on file   Other Topics Concern     Not on file   Social History Narrative     Not on file       Review of Systems: 12 organ system review done and negative except as noted in the HPI.    Family History:  No family history on file.      Allergies:  Allergies   Allergen Reactions     Morphine Other (See Comments)     hallucinations     Vicodin [Hydrocodone-Acetaminophen] Other (See Comments)     Urinary retention       Medications:  Current Outpatient Medications   Medication Sig Dispense Refill     aspirin 81 MG EC tablet Take 81 mg by mouth daily.       bumetanide (BUMEX) 2 MG tablet Take 1 tablet (2 mg total) by mouth 2 (two) times a day at 9am and 6pm. 90 tablet 0     ferrous gluconate 324 mg (36 mg iron) Tab Take 1 tablet by mouth daily.              finasteride (PROSCAR) 5 mg tablet Take 5 mg by mouth daily.              FLUoxetine (PROZAC) 20 MG capsule Take 20 mg by mouth daily.              hydrALAZINE (APRESOLINE) 10 MG tablet Take 1 tablet (10 mg total) by mouth every 8 (eight) hours. 90 tablet 0     isosorbide dinitrate (ISORDIL) 10 MG tablet Take 1 tablet (10 mg total) by mouth 2 times a day at 9:00am and 5:00pm. 90 tablet 0     losartan (COZAAR) 50 MG tablet Take 1 tablet (50 mg total) by mouth daily. 30 tablet 0     metoprolol succinate (TOPROL-XL) 200 MG 24 hr tablet Take 50 mg by mouth daily.              potassium chloride (KLOR-CON) 20 mEq packet Take 20 mEq by mouth 2 (two) times a day. 180 packet 1     tamsulosin (FLOMAX) 0.4 mg cap Take 0.4 mg by mouth Daily after lunch.              warfarin (COUMADIN/JANTOVEN) 5 MG tablet Take 2.5 mg by mouth daily.              No current facility-administered medications for this visit.        Objective:   Vital  signs:  /78 (Patient Site: Left Arm, Patient Position: Sitting, Cuff Size: Adult Regular)   Pulse 100   Resp 16   Wt 185 lb 6 oz (84.1 kg)   SpO2 97%   BMI 26.60 kg/m        Physical Exam:    GENERAL APPEARANCE: Alert, cooperative and in no acute distress.  HEENT: No scleral icterus. No Xanthelasma. Oral mucuos membranes pink and moist.  NECK: No JVD. Thyroid not visualized  CHEST: clear to auscultation  CARDIOVASCULAR: S1, S2 regular. Soft HSM  PULSES: Radial and posterior tibial pulses are intact and symmetric.   ABDOMEN: Nontender. BS+.   EXTREMITIES: No cyanosis, clubbing or edema.  SKIN: Warm, well perfused  NEURO: Grossly nonfocal    Lab Results:  LIPIDS:  Lab Results   Component Value Date    CHOL 183 03/06/2017     Lab Results   Component Value Date    HDL 51 03/06/2017     Lab Results   Component Value Date    LDLCALC 119 03/06/2017     Lab Results   Component Value Date    TRIG 65 03/06/2017     No components found for: CHOLHDL    BMP:  Lab Results   Component Value Date    CREATININE 1.30 10/20/2019    BUN 29 (H) 10/20/2019     10/20/2019    K 3.7 10/20/2019     10/20/2019    CO2 24 10/20/2019         ECG and echo films independently reviewed      KRISTIN MCKAY MD  [facility]

## 2021-06-02 NOTE — TELEPHONE ENCOUNTER
Called and spoke to patient's daughter Alma Delia. Daughter reports that patient has had worsening symptoms of shortness of breath and is concerned about her father. Advised Alma Delia call would be placed to assess patient's symptoms. -ashley

## 2021-06-03 VITALS — HEIGHT: 68 IN | BODY MASS INDEX: 27.11 KG/M2 | WEIGHT: 178.9 LBS

## 2021-06-03 VITALS
WEIGHT: 191 LBS | BODY MASS INDEX: 29.98 KG/M2 | HEART RATE: 104 BPM | DIASTOLIC BLOOD PRESSURE: 80 MMHG | HEIGHT: 67 IN | SYSTOLIC BLOOD PRESSURE: 116 MMHG | RESPIRATION RATE: 20 BRPM

## 2021-06-03 VITALS — BODY MASS INDEX: 30.61 KG/M2 | WEIGHT: 195 LBS | HEIGHT: 67 IN

## 2021-06-03 VITALS
OXYGEN SATURATION: 97 % | DIASTOLIC BLOOD PRESSURE: 78 MMHG | HEART RATE: 100 BPM | RESPIRATION RATE: 16 BRPM | SYSTOLIC BLOOD PRESSURE: 100 MMHG | BODY MASS INDEX: 26.6 KG/M2 | WEIGHT: 185.38 LBS

## 2021-06-03 VITALS
RESPIRATION RATE: 24 BRPM | HEIGHT: 70 IN | SYSTOLIC BLOOD PRESSURE: 130 MMHG | BODY MASS INDEX: 26.92 KG/M2 | WEIGHT: 188 LBS | HEART RATE: 88 BPM | DIASTOLIC BLOOD PRESSURE: 90 MMHG

## 2021-06-03 VITALS — HEIGHT: 67 IN | BODY MASS INDEX: 29.98 KG/M2 | WEIGHT: 191 LBS

## 2021-06-03 VITALS — HEIGHT: 67 IN | WEIGHT: 195 LBS | BODY MASS INDEX: 30.61 KG/M2

## 2021-06-03 VITALS
SYSTOLIC BLOOD PRESSURE: 130 MMHG | HEIGHT: 67 IN | DIASTOLIC BLOOD PRESSURE: 80 MMHG | HEART RATE: 80 BPM | BODY MASS INDEX: 30.53 KG/M2 | WEIGHT: 194.5 LBS | RESPIRATION RATE: 28 BRPM

## 2021-06-03 NOTE — PROGRESS NOTES
Cardiothoracic Surgery Consult    Date of Service: 12/3/2019    REFERRING CARDIOLOGIST: Diaz Navarrete MD    REASON FOR CONSULTATION: Severe, mitral regurgitation     HISTORY OF PRESENT ILLNESS: Mr. Mosqueda is a 84 y.o. year-old male with a past cardiac history of coronary artery disease s/p coronary bypass in 2012 with LIMA to LAD and RSVG to RI. He was evaluated for severe mitral regurgitation related to restricted posterior leaflet motion. In 2017 he underwent Mitraclip at [facility] with residual MR and a failed second clip deployment. The procedure was complicated by an air embolism and CVA requiring a prolonged hospital stay. He has progressive systolic heart failure with recent hospitalization and is now seeking evaluation for repeat Mitraclip. He is also being evaluated for PCI to his native RCA. He lead an active life and lives independently. He is here today with his neighbor who is a retired CRNA.     He denies chest pain, tightness, syncope or pre syncope. He symptoms include severe shortness of breath with NYHA class III symptoms.     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     Hypertension      Mitral insufficiency      Myocardial infarction (H) 2017       PAST SURGICAL HISTORY:   Past Surgical History:   Procedure Laterality Date     CARDIAC CATHETERIZATION N/A 3/6/2017    Coronary Angiogram; Diaz Navarrete MD; [facility]     CARDIAC CATHETERIZATION   03/27/2017     CORONARY ANGIOPLASTY      mid LCX     CORONARY ARTERY BYPASS GRAFT  2012    LIMA to LAD, SVG to Intermediate     CORONARY STENT PLACEMENT       CV CORONARY ANGIOGRAM N/A 11/13/2019    Procedure: Coronary Angiogram;  Surgeon: Jose Jackson MD;  Location: [facility] Cath Lab;  Service: Cardiology     CV LEFT HEART CATHETERIZATION WO LEFT VETRICULOGRAM Left 11/13/2019    Procedure: Left Heart Catheterization Without Left Ventriculogram;  Surgeon: Jose Jackson MD;  Location: [facility] Cath Lab;  Service: Cardiology       ALLERGIES:   Allergies   Allergen Reactions     Morphine Other (See Comments)     hallucinations     Vicodin [Hydrocodone-Acetaminophen] Other (See Comments)     Urinary retention          CURRENT MEDICATIONS:  Current Outpatient Medications on File Prior to Visit   Medication Sig Dispense Refill     aspirin 81 mg chewable tablet Chew 1 tablet (81 mg total) daily. 30 tablet 0     atorvastatin (LIPITOR) 80 MG tablet Take 1 tablet (80 mg total) by mouth at bedtime. 30 tablet 11     bumetanide (BUMEX) 2 MG tablet Take 1 tablet (2 mg total) by mouth 2 (two) times a day at 9am and 6pm. 90 tablet 0     finasteride (PROSCAR) 5 mg tablet Take 5 mg by mouth daily.              hydrALAZINE (APRESOLINE) 10 MG tablet Take 1 tablet (10 mg total) by mouth every 8 (eight) hours. 90 tablet 0     isosorbide dinitrate (ISORDIL) 10 MG tablet Take 1 tablet (10 mg total) by mouth 2 times a day at 9:00am and 5:00pm. 90 tablet 0     losartan (COZAAR) 50 MG tablet Take 1 tablet (50 mg total) by mouth at bedtime. 30 tablet 0     metoprolol succinate (TOPROL-XL) 50 MG 24 hr tablet Take 50 mg by mouth daily.              potassium chloride (KLOR-CON) 20 mEq packet Take 20 mEq by mouth 2 (two) times a day. 180 packet 1     tamsulosin (FLOMAX) 0.4 mg cap Take 0.4 mg by mouth Daily after lunch.              ticagrelor (BRILINTA) 90 mg Tab Take 1 tablet (90 mg total) by mouth 2 (two) times a day. For 12  months 60 tablet 11     warfarin (COUMADIN/JANTOVEN) 5 MG tablet Take 2.5 mg by mouth daily.              FLUoxetine (PROZAC) 20 MG capsule Take 20 mg by mouth daily.              No current facility-administered medications on file prior to visit.          FAMILY HISTORY: No family history on file.  The family history was reviewed and is not pertinent to the patient's disease/illness    SOCIAL HISTORY:   Social History     Socioeconomic History     Marital status:      Spouse name: Not on file     Number of children: Not on file     Years of education: Not on file     Highest education level: Not on file   Occupational History     Employer: RETIRED   Social Needs     Financial resource strain: Not on file     Food insecurity:     Worry: Not on file     Inability: Not on file     Transportation needs:     Medical: Not on file     Non-medical: Not on file   Tobacco Use     Smoking status: Former Smoker     Last attempt to quit: 1960     Years since quittin.9     Smokeless tobacco: Never Used   Substance and Sexual Activity     Alcohol use: Yes     Alcohol/week: 2.0 standard drinks     Types: 1 Glasses of wine, 1 Shots of liquor per week     Drinks per session: 1 or 2     Comment: daily every evening     Drug use: Never     Sexual activity: Yes     Partners: Female   Lifestyle     Physical activity:     Days per week: Not on file     Minutes per session: Not on file     Stress: Not on file   Relationships     Social connections:     Talks on phone: Not on file     Gets together: Not on file     Attends Synagogue service: Not on file     Active member of club or organization: Not on file     Attends meetings of clubs or organizations: Not on file     Relationship status: Not on file     Intimate partner violence:     Fear of current or ex partner: Not on file     Emotionally abused: Not on file     Physically abused: Not on file     Forced sexual activity: Not on file   Other Topics Concern     Not on  file   Social History Narrative     Not on file       REVIEW OF SYSTEMS:  A complete 10 point review of systems was obtained and is negative other than the above stated complaints    PHYSICAL EXAMINATION:   Vitals: /64 (Patient Site: Right Arm, Patient Position: Sitting, Cuff Size: Adult Regular)   Pulse 94   Resp 16   Wt 185 lb 4 oz (84 kg)   SpO2 98%   BMI 28.17 kg/m       GENERAL:  Well developed and well nourished   HEENT: EOMI, conjunctiva anicteric and sclera clear   NECK: trachea midline, mild JVD, no carotid bruits  CARDIOVASCULAR: regular rate and rhythm, prior healed sternal scar, normal S1 and S2, systolic murmur heard best at 5th interspace anterior axillary line.   RESPIRATIONS: clear bilaterally.   ABDOMEN: no masses palpable and soft, non-tender   EXTREMITIES: no deformity or swelling   NEUROLOGIC: intact and symmetric with no focal deficits.   PSYCHIATRIC: alert and oriented x3, pleasant     LABORATORY STUDIES:   Lab Results   Component Value Date    WBC 6.9 11/09/2019    HGB 13.3 (L) 11/14/2019    HCT 39.3 (L) 11/09/2019    MCV 95 11/09/2019     11/09/2019      Lab Results   Component Value Date    CREATININE 1.39 (H) 11/29/2019    BUN 31 (H) 11/29/2019     11/29/2019    K 4.6 11/29/2019     11/29/2019    CO2 32 (H) 11/29/2019     No results found for: HGBA1C    11/13 EKG: atrial fibrillation, rate 101    CARDIAC CATHETERIZATION: This study was personally reviewed by me  Patent DEMPSEY to LAD, patent RSVG to RI, occluded LCx with RI vein graft collaterals, occluded RSVG to RCA  PCI with UGO to pRCA.    TRANSTHORACIC ECHOCARDIOGRAM: This study was personally reviewed by me  Moderate concentric LVH, LVEF 30%, mild RV dysfunction, mitraclip detached from posterior leaflet and now only on anterior leaflet, severe MR, moderate PH with RVSP 51 mmHg plus RA (8 mmHg).     IMPRESSION AND PLAN: Mr. Mosqueda is a 84 y.o. with CAD s/p CAB x 3 with occluded vein graft to RCA now s/p  PCI and UGO with chronic, ischemic severe MR s/p Mitraclip in 2017 now with severe MR again due to detached Mitraclip. He is being evaluated for repeat Mitraclip. He is not a surgical candidate for MVR, which would be replacement given Bethany class III disease due to restricted posterior leaflet, not repair, in setting of reduced EF and redo sternotomy. I agree that if reduction in MR is possible repeat Mitraclip is an option. Further workup and scheduling per Dr. Navarrete's office.     I spent 45 minutes in consultation and greater than 50% was spent counseling and in coordination of care.    Thank you very much for this referral.     Jesus Alberto Clement   12/3/2019   10:28 AM

## 2021-06-03 NOTE — TELEPHONE ENCOUNTER
MitraClip implantation on 12/7/17 at [facility], complicated by air embolus, CVA, and DVT requiring IVC filter. He was transferred to the [facility] for inpatient rehabilitation on 12/21/17. Also noted to have SDLA of mitraclip.     Patient with known CAD, severe MR with SLDA of mitraclip and progressive ROONEY saw Dr Navarrete for follow up on 10/25/19. Pt interested in another attempt at transcatheter mitral valve repair.     We planned for staged PCI of RCA. However patient presented to ED with shortness of breath and PCI was completed on 11/13 with Dr. Jackson. Consider PCI to Lcx  at some point in the future depending on clinical progress.     Pt saw Dr Clement for a surgical consult yesterday. See note.       Plan:  Take last dose of warfarin on Dec 12, no bridging per Dr Navarrete (see below)  Preop Mon Dec 16, arrival time 9:15 AM  MitraClip Weds Dec 18, arrival time 9:00 AM for second case.      -----------------------------------------------------------------------------------------    From: Diaz Navarrete MD  Sent: 11/19/2019   3:27 PM CST  To: Ruddy Finnegan, RN    I think it wont hurt to have another CTS consult, since its been so long.    No need for bridging.    Thanks

## 2021-06-03 NOTE — PATIENT INSTRUCTIONS - HE
Jass Mosqueda,    It was a pleasure to see you today at the [facility].     My recommendations after this visit include:  - Your labs will be on MyChart.   - Ruddy will call about when to stop warfarin prior to mitraclip.   - I will talk to the cardiologist about changing to plavix and will get back to you next week.   - Continue to monitor for signs of retaining fluid (increasing weights, shortness of breath, swelling) and call with any concerns. [phone]    Britney De Paz, CNP

## 2021-06-03 NOTE — TELEPHONE ENCOUNTER
Spoke with daughter Tammy. Sent rx for plavix to pharmacy. Instructed to preload with 600 mg for first dose, then 75 mg daily thereafter.     Ruddy Finnegan RN 12/3/19 10:37 AM  [facility]  [phone]      ----- Message -----   From: Jose Jackson MD   Sent: 12/2/2019   7:40 AM CST   To: Britney De Paz, KASSIDY Tan,     Switching to clopidogrel is reasonable, would just make sure to give 600mg once, then og to 75mg daily from then on.     Thx!     Jose

## 2021-06-03 NOTE — TELEPHONE ENCOUNTER
See follow up visit with Dr. Navarrete from 10/25. Planned for staged PCI of RCA. However patient presented to ED with shortness of breath and PCI was completed on 11/13 with Dr. Jackson.    Proceed w/ planning outpatient MitraClip attempt and consider PCI to Lcx  at some point in the future depending on clinical progress.     I called and spoke with patient. He agrees to proceed with scheduling mitraclip. He requests that we call daughter carlos. I will also call the patient closer to his DOS with warfarin holding instructions.     Plan:  Preop Mon Dec 16 time TBD  MitraClip Weds Dec 18, arrival time 9:00 AM for second case.

## 2021-06-03 NOTE — PROGRESS NOTES
Order received for cardiac rehab. Faxed to [facility] for scheduling.

## 2021-06-04 VITALS
DIASTOLIC BLOOD PRESSURE: 80 MMHG | HEART RATE: 90 BPM | WEIGHT: 183.3 LBS | BODY MASS INDEX: 27.78 KG/M2 | RESPIRATION RATE: 28 BRPM | HEIGHT: 68 IN | SYSTOLIC BLOOD PRESSURE: 126 MMHG

## 2021-06-04 VITALS
SYSTOLIC BLOOD PRESSURE: 124 MMHG | RESPIRATION RATE: 18 BRPM | BODY MASS INDEX: 27.58 KG/M2 | DIASTOLIC BLOOD PRESSURE: 78 MMHG | OXYGEN SATURATION: 97 % | HEIGHT: 68 IN | HEART RATE: 64 BPM | WEIGHT: 182 LBS

## 2021-06-04 VITALS
SYSTOLIC BLOOD PRESSURE: 124 MMHG | HEART RATE: 96 BPM | DIASTOLIC BLOOD PRESSURE: 90 MMHG | BODY MASS INDEX: 28.34 KG/M2 | RESPIRATION RATE: 16 BRPM | HEIGHT: 68 IN | WEIGHT: 187 LBS

## 2021-06-04 VITALS
WEIGHT: 192.4 LBS | OXYGEN SATURATION: 97 % | HEIGHT: 68 IN | DIASTOLIC BLOOD PRESSURE: 100 MMHG | SYSTOLIC BLOOD PRESSURE: 130 MMHG | HEART RATE: 68 BPM | BODY MASS INDEX: 29.16 KG/M2 | RESPIRATION RATE: 20 BRPM

## 2021-06-04 VITALS
RESPIRATION RATE: 16 BRPM | OXYGEN SATURATION: 98 % | WEIGHT: 185.25 LBS | HEART RATE: 94 BPM | BODY MASS INDEX: 28.17 KG/M2 | DIASTOLIC BLOOD PRESSURE: 64 MMHG | SYSTOLIC BLOOD PRESSURE: 122 MMHG

## 2021-06-04 VITALS — HEIGHT: 68 IN | BODY MASS INDEX: 28.34 KG/M2 | WEIGHT: 187 LBS

## 2021-06-04 VITALS — HEIGHT: 68 IN | WEIGHT: 187.2 LBS | BODY MASS INDEX: 28.37 KG/M2

## 2021-06-04 NOTE — ANESTHESIA CARE TRANSFER NOTE
Last vitals:   Vitals:    12/18/19 1029   BP: 113/79   Pulse: 78   Resp: 18   Temp: 36.6  C (97.8  F)   SpO2: 99%     Patient's level of consciousness is awake and drowsy  Spontaneous respirations: yes  Maintains airway independently: yes  Dentition unchanged: yes  Oropharynx: oropharynx clear of all foreign objects    QCDR Measures:  ASA# 20 - Surgical Safety Checklist: WHO surgical safety checklist completed prior to induction    PQRS# 430 - Adult PONV Prevention: 4558F - Pt received => 2 anti-emetic agents (different classes) preop & intraop  ASA# 8 - Peds PONV Prevention: NA - Not pediatric patient, not GA or 2 or more risk factors NOT present  PQRS# 424 - Rita-op Temp Management: 4559F - At least one body temp DOCUMENTED => 35.5C or 95.9F within required timeframe  PQRS# 426 - PACU Transfer Protocol:Yes  ASA# 14 - Acute Post-op Pain: ASA14B - Patient did NOT experience pain >= 7 out of 10

## 2021-06-04 NOTE — PROGRESS NOTES
Reason for today s visit: Pre-op RN Visit    Patient scheduled for Transcatheter Mitral Valve Repair with MitraClip on Weds 12/18/19  arrival time: 0900 for 2nd case  at [facility] with Dr. Navarrete    Referring provider: Dr. Lawrence  Primary Cardiology: Dr. Lawrence  PCP: Dr. Krause    CT surgery consult completed: Dr. Clement (date 12/2/19)      Tests completed today:  Pre-procedure labs drawn: CMP, CBC, INR, Mag, BNP, Type and Screen  MRSA nasal cultures  EKG  Chest X ray if not completed within 30 days  Bactroban administered in bilateral nares     6MWT: did not assess. Patient with severe ROONEY and falls risk.     STS score: 12% repair, 14% rplcmt    +Serum albumin (date completed 12/16/19): 3.5  +5 meter walk (date completed 12/16/19): 8.37, 8.36, 8.39  Dejesus index (date completed 12/16/19): 6/6    MINI-COG Assessment:  Number of words recalled: 2/3  Result of CDT: 2/2  Score: 4/5    Total Frailty Score: 2      Patient instructed on medications:   Take last dose of warfarin on Dec 12, no bridging per Dr. Navarrete  AM of procedure, take only brilinta, hydralazine, isosorbide  Loading dose of ASA: yes, 325 mg in CSC      Patient instructed on skin prep:  Patient sent home with hibiclens solution and instructed for skin prep be done evening before procedure.      Advanced Directive:  Does the patient have an advanced directive: gave copy of honoring choices      Surgery and anesthesia consents  Debbie HARRISON reviewed risks of mitraclip/DC and signed consents  Procedure and Anesthesia consents to be completed am of surgery      Pre and post procedure education was also reviewed with the patient and daughter. No further questions and ready to proceed with surgery as planned.    Instructed to come to the main entrance of [facility] at 0900 AM on Weds 12/18/19    All questions were answered to patient and daughter by Debbie HARRISON and Ruddy Finnegan RN    Daughter present at the time  of appointment.

## 2021-06-04 NOTE — TELEPHONE ENCOUNTER
Case time moved to first case per Dr. Navarrete. Patient and his daughter called and informed to arrive at 5:30 am. I encouraged to call with any concerns or questions.     JORDAN Wilson  Valve Clinic Coordinator

## 2021-06-04 NOTE — ANESTHESIA PREPROCEDURE EVALUATION
Anesthesia Evaluation      Patient summary reviewed   No history of anesthetic complications     Airway   Mallampati: II  Neck ROM: full   Pulmonary - normal exam    breath sounds clear to auscultation  (+) a smoker  (-) sleep apnea                         Cardiovascular - normal exam  Exercise tolerance: < 4 METS  (+) hypertension, valvular problems/murmurs MR, past MI, CAD, CABG/stent, dysrhythmias, CHF, , hypercholesterolemia,     Rhythm: regular  Rate: normal,      ROS comment:   Left ventricle is moderately dilated wtih moderate concentric hypertrophy.    Left ventricle ejection fraction is moderately decreased. The calculated left ventricular ejection fraction is 30%.    The right ventricle is mildly dilated. The systolic function is mildly reduced. TAPSE is abnormal, which is consistent with abnormal right ventricular systolic function.    The following structural abnormalities were observed: MitraClip. A MitraClip is visualized attached to the anterior leaflet of the mitral valve that is detached from the posterior leaflet. There is mild valve leaflet thickening. No mitral stenosis present. Severe mitral regurgitation.    Moderate pulmonary hypertension present. The estimated systolic pulmonary artery pressure is 51 mmhg.    When compared to the previous study dated 9/13/2019, left ventricular ejection fraction has declined from 40% down to 30%.     Neuro/Psych    (+) CVA ,     Endo/Other - negative ROS      GI/Hepatic/Renal    (+) esophageal disease,  chronic renal disease,           Dental    (+) poor dentition                       Anesthesia Plan  Planned anesthetic: general endotracheal    ASA 3   Induction: intravenous   Anesthetic plan and risks discussed with: patient and child/children  Anesthesia plan special considerations: antiemetics, arterial catheterization, IV therapy two IVs,   Post-op plan: routine recovery

## 2021-06-04 NOTE — ANESTHESIA POSTPROCEDURE EVALUATION
Patient: Jass Mosqueda  CV Transcatheter Mitral Valve Repair with MitraClip - general anesthesia whole case, H&P done, teach-Ruddy, non invasive-Dr. Katz, no device  Anesthesia type: No value filed.    Patient location: [facility]  Last vitals:   Vitals Value Taken Time   BP 98/72 12/18/2019 11:45 AM   Temp 36.6  C (97.8  F) 12/18/2019 10:29 AM   Pulse 89 12/18/2019  1:55 PM   Resp 23 12/18/2019  1:55 PM   SpO2 96 % 12/18/2019  1:55 PM   Vitals shown include unvalidated device data.  Post vital signs: stable  Level of consciousness: awake and responds to simple questions  Post-anesthesia pain: pain controlled  Post-anesthesia nausea and vomiting: no  Pulmonary: unassisted, return to baseline  Cardiovascular: stable and blood pressure at baseline  Hydration: adequate  Anesthetic events: no    QCDR Measures:  ASA# 11 - Rita-op Cardiac Arrest: ASA11B - Patient did NOT experience unanticipated cardiac arrest  ASA# 12 - Rita-op Mortality Rate: ASA12B - Patient did NOT die  ASA# 13 - PACU Re-Intubation Rate: ASA13B - Patient did NOT require a new airway mgmt  ASA# 10 - Composite Anes Safety: ASA10A - No serious adverse event    Additional Notes:

## 2021-06-05 NOTE — TELEPHONE ENCOUNTER
Patient daughter, Giovana, calling with update on BP, weight and symptoms per Dr. Jackson request after 1/24/2020 OVDanelle Akhtar reports patient's weight at 190 #. He is only somewhat compliant with low sodium diet. Patient reports some dizziness. BP's are as follows:    126/88   130/64   141/96   114/100   134/88     Patient is scheduled to see Britney Smallwood on 2/7/2020 for up titration of his Losartan. Any recommendations in the interim? -ejb

## 2021-06-05 NOTE — TELEPHONE ENCOUNTER
----- Message from Leena Mcduffie sent at 1/31/2020  4:04 PM CST -----      Caller: daughter Giovana  Primary cardiologist: Dr. Lawrence  Detailed reason for call: Wanted to give BP results to nurse    Best phone number: [phone]  Best time to contact: today  Ok to leave a detailed message? yes  Device? yes

## 2021-06-05 NOTE — PATIENT INSTRUCTIONS - HE
I think you have had a very successful procedure and even had some recovery in heart function    - now that you are on losartan,we  can stop isordil/hydral as long as today's kidney function is stable, follow up in HF clinic in 2 wks. For losartan uptitration  - again I would like to re-iterate need for Na restriction (2g of Na per 24 hours)  - weigh ourself daily, if weight goes up by 2lbs in a day, 5 lbs in 2 days or you get short of breath/build up fluid, let us know immediately  - double up bumex tomorrow - call in 1 week with updated weight, BP's, update on how he feels    - continue clopidogrel/warfarin for at least 12 months, then can likely do ASA/warfarin    F/u in HF clinic in 2 wks. For losartan uptitration  F/u w/ Me in 3 mos  Fu w/  in 6 mos

## 2021-06-05 NOTE — PROGRESS NOTES
[facility] Heart Care Note    Assessment:    Jass Mosqueda is a 84 y.o. old male with severe MR s/p SLDA (failed) at OSH followed by a successful one here 12/19, CAD s/p prior CABG w/ L-LAD, V-OM, V-RCA and recent PCI to RCA, cardiomyopathy EF 35-40, AF, CKD, who is here for f/u after MitraClip repair.     # MR - prior unsuccessful OSH attempt at MitraClip w/ partial SLDA, now s/p successful  MitraClip NTRx1 - trace to 1+ redial MR, mean gradient 4  - continue metop/losartan/bumetanide  - now that he is on losartan, can stop isordil/hydral as long as today's kidney function is stable, f/u in HF clinic in 2 wks. For ARB uptitration  - again re-iterated need for Na restriction - he has not been entirely compliant  -  double up bumex tomorrow - call in 1 week with updated weight, BP's, update on how he feels    # CAD - severe lesion in pRCA s/p DESx1, patent L-LAD and V-RI/OM; Lcx now occluded and fills via diseased segment through a V-RI  - normal LVEDP  - consider PCI to Lcx  at some point in the future, depending on clinical progress  - continue clopidogrel/warfarin for at least 12 months, then can likely do ASA/wararin  - add atorva 80  - continue aggressive risk factor modification    F/u in HF clinic in 2 wks. For ARB uptitration  F/u w/ Me in 3 mos  Lee w/  in 6 mos    SNEHA ISAAC  [facility] HEART CARE  [phone]  ______________________________________________________________________    Subjective:  CC: F/u MR MENDENHALL had the opportunity to see Jass Mosqueda at the [facility]. Jass Mosqueda is a 84 y.o. male with a known history of severe MR s/p SLDA (failed) at OSH followed by a successful one here 12/19, CAD s/p prior CABG w/ L-LAD, V-OM, V-RCA and recent PCI to RCA, cardiomyopathy EF 35-40, AF, CKD, who is here for f/u after MitraClip repair.    He denies CP, + ROONEY y the time her gets to the bathroom, but able to participate in cardiac rehab, no  orthopnea/PND, some residual edema but improved, no syncope/N/V/D/F/C.     ______________________________________________________________________      Review of Systems:   As noted in HPI, all others reviewed and are negative      Problem List:  Patient Active Problem List   Diagnosis     Hyperlipidemia     Benign essential hypertension     CAD (coronary artery disease)     Rodrigues's esophagus     Severe mitral regurgitation     Persistent atrial fibrillation     DNAR (do not attempt resuscitation)     CHF (congestive heart failure), NYHA class I, acute on chronic, combined (H)     Acute on chronic systolic congestive heart failure (H)     Permanent atrial fibrillation     Abnormal findings on cardiac catheterization     S/P mitral valve clip implantation     Chronic atrial fibrillation     Medical History:  Past Medical History:   Diagnosis Date     Acute deep vein thrombosis (DVT) of femoral vein of right lower extremity (H)      Acute deep vein thrombosis (DVT) of popliteal vein of right lower extremity (H)      Acute myocardial infarction (H) 11/10/2007     Acute posthemorrhagic anemia 11/10/2007     JANELLE (acute kidney injury) (H) 12/9/2017     Atrial fibrillation (H)      Combined forms of age-related cataract 6/22/2017     Coronary artery disease      DNAR (do not attempt resuscitation)      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     Hypertension      Mitral insufficiency      Myocardial infarction (H) 2017     Surgical History:  Past Surgical History:   Procedure Laterality Date     CARDIAC CATHETERIZATION N/A 3/6/2017    Coronary Angiogram; Diaz Navarrete MD; [facility] Lab     CARDIAC CATHETERIZATION  03/27/2017     CORONARY ANGIOPLASTY      mid LCX     CORONARY ARTERY BYPASS GRAFT  2012    LIMA to LAD, SVG to Intermediate     CORONARY STENT PLACEMENT       CV CORONARY ANGIOGRAM N/A 11/13/2019    Procedure: Coronary Angiogram;  Surgeon: Jose Jackson MD;  Location: [facility] Cath Lab;   Service: Cardiology     CV LEFT HEART CATHETERIZATION WO LEFT VETRICULOGRAM Left 2019    Procedure: Left Heart Catheterization Without Left Ventriculogram;  Surgeon: Jose Jackson MD;  Location: [facility] Cath Lab;  Service: Cardiology     CV TRANSCATHETER MITRAL VALVE REPAIR N/A 2019    Procedure: CV Transcatheter Mitral Valve Repair with MitraClip;  Surgeon: Diaz Navarrete MD;  Location: [facility] Cath Lab;  Service: Cardiology     john clip  2017    abbott, failed and cva post     Social History:  Social History     Socioeconomic History     Marital status:      Spouse name: Not on file     Number of children: Not on file     Years of education: Not on file     Highest education level: Not on file   Occupational History     Employer: RETIRED   Social Needs     Financial resource strain: Not on file     Food insecurity:     Worry: Not on file     Inability: Not on file     Transportation needs:     Medical: Not on file     Non-medical: Not on file   Tobacco Use     Smoking status: Former Smoker     Last attempt to quit: 1960     Years since quittin.1     Smokeless tobacco: Never Used   Substance and Sexual Activity     Alcohol use: Yes     Alcohol/week: 1.0 standard drinks     Types: 1 Glasses of wine per week     Drinks per session: 1 or 2     Comment: daily every evening     Drug use: Never     Sexual activity: Yes     Partners: Female   Lifestyle     Physical activity:     Days per week: Not on file     Minutes per session: Not on file     Stress: Not on file   Relationships     Social connections:     Talks on phone: Not on file     Gets together: Not on file     Attends Latter day service: Not on file     Active member of club or organization: Not on file     Attends meetings of clubs or organizations: Not on file     Relationship status: Not on file     Intimate partner violence:     Fear of current or ex partner: Not on file     Emotionally abused: Not on file      Physically abused: Not on file     Forced sexual activity: Not on file   Other Topics Concern     Not on file   Social History Narrative     Not on file           Family History:  No family history on file.      Allergies:  Allergies   Allergen Reactions     Morphine Other (See Comments)     hallucinations     Vicodin [Hydrocodone-Acetaminophen] Other (See Comments)     Urinary retention       Medications:  Current Outpatient Medications   Medication Sig Dispense Refill     atorvastatin (LIPITOR) 80 MG tablet Take 1 tablet (80 mg total) by mouth at bedtime. 30 tablet 11     bumetanide (BUMEX) 2 MG tablet Take 1 tablet (2 mg total) by mouth 2 (two) times a day at 9am and 6pm. 90 tablet 0     clopidogrel (PLAVIX) 75 mg tablet Take 1 tablet (75 mg total) by mouth daily. (Patient taking differently: Take 75 mg by mouth daily. PATIENT HAS NOT STARTED YET.  WAS STILL TAKING TICAGRELOR PTA) 90 tablet 3     docusate sodium (COLACE) 100 MG capsule Take 100 mg by mouth at bedtime.       finasteride (PROSCAR) 5 mg tablet Take 5 mg by mouth daily.              FLUoxetine (PROZAC) 20 MG capsule Take 20 mg by mouth daily.              hydrALAZINE (APRESOLINE) 10 MG tablet Take 1 tablet (10 mg total) by mouth every 8 (eight) hours. 90 tablet 0     isosorbide dinitrate (ISORDIL) 10 MG tablet Take 1 tablet (10 mg total) by mouth 2 times a day at 9:00am and 5:00pm. 90 tablet 0     losartan (COZAAR) 50 MG tablet Take 1 tablet (50 mg total) by mouth at bedtime. 30 tablet 0     metoprolol succinate (TOPROL-XL) 50 MG 24 hr tablet Take 50 mg by mouth daily.              potassium chloride (KLOR-CON) 20 mEq packet Take 20 mEq by mouth 2 (two) times a day. 180 packet 1     tamsulosin (FLOMAX) 0.4 mg cap Take 0.4 mg by mouth Daily after lunch.              warfarin (COUMADIN/JANTOVEN) 5 MG tablet Take 2.5 mg by mouth daily. 2.5 mg mon, wed, then 1.25 mg rest of week       No current facility-administered medications for this visit.   "      Objective:   Vital signs:  BP (!) 130/100 (Patient Site: Left Arm, Patient Position: Sitting, Cuff Size: Adult Regular)   Pulse 68   Resp 20   Ht 5' 8\" (1.727 m)   Wt 192 lb 6.4 oz (87.3 kg)   SpO2 97%   BMI 29.25 kg/m        Physical Exam:    GENERAL APPEARANCE: Alert, cooperative and in no acute distress.   HEENT: No scleral icterus. Oral mucuos membranes pink and moist.   NECK: JVP 7. No Hepatojugular reflux. Thyroid not visualized. No lymphadenopathy   CHEST: clear to auscultation   CARDIOVASCULAR: S1, S2 without murmur ,clicks or rubs. Brachial, radial and posterior tibial pulses are intact and symetric. No carotid bruits noted. No edema  ABDOMEN: Nontender. BS+. No bruits.   SKIN: No Xanthelasma   Musculoskeletal: No cyanosis, clubbing or swelling.      Lab Results:  LIPIDS:  Lab Results   Component Value Date    CHOL 183 03/06/2017     Lab Results   Component Value Date    HDL 51 03/06/2017     Lab Results   Component Value Date    LDLCALC 119 03/06/2017     Lab Results   Component Value Date    TRIG 65 03/06/2017     No components found for: CHOLHDL    BMP:  Lab Results   Component Value Date    CREATININE 1.58 (H) 12/19/2019    BUN 35 (H) 12/19/2019     12/19/2019    K 4.6 12/19/2019     12/19/2019    CO2 23 12/19/2019         [facility] HEART [facility]                  "

## 2021-06-06 NOTE — TELEPHONE ENCOUNTER
Noted that patient currently inpatient at [facility] with Dx of Epistaxis.     LELAND Jackson and Britney Smallwood. -ejb

## 2021-06-07 NOTE — TELEPHONE ENCOUNTER
Called to scheduled 3 mo follow up with MY and spoke to patients daughter, Alma Delia.  The patient has been placed on Hospice and they have decided to not schedule any other follow ups at this time

## 2021-06-10 NOTE — PROGRESS NOTES
I had an opportunity to visit with Mr. Jass Mosqueda today at the [facility].  He stopped by to see if could talk about his situation.  He does have quite significant mitral insufficiency and plans were to try to use a mitral clip.  His preoperative coronary angiogram did show some significant coronary artery disease and he was going to be brought back for a staged PCI.  He was started on Plavix and aspirin and had quite significant bleeding.  He states that this happened to him after bypass surgery when he was on aspirin and that he bleeds terribly with any antiplatelet agents.  He was having black stools as well as nosebleeds and went into the emergency room.  He has subsequently stopped both the aspirin and the Plavix.  He is not interested in any further evaluation at this time but would like to reconsider after he returns from [place] in August.    We talked about the possibility of considering open surgery although it would be a redo, he would not need any prolonged antiplatelet agents.  He might consider that in the future.    I would like to discuss his case with Dr. Montano and Dr. Navarrete and we will get back to the patient although he was pretty set that he will not be having any procedures for a while.  Does have an appointment with his dentist for a checkup at patient does not feel like he has any significant issues from a dental standpoint.      Yeison Lawrence

## 2021-06-11 NOTE — PROGRESS NOTES
Today I saw Mr. Mosqueda in follow-up.  He had previously been scheduled for percutaneous intervention and follow-up mitral clip procedure.  The patient is adamant that antiplatelet therapy is dangerous and has had 2 bad experiences with antiplatelet therapy in the past.  He wants to know today if he can have the procedure without any antiplatelet therapy.  He is not feeling as well and plays out quicker feels more short of breath and less energy.  He thought he would take a trip to [place] and decide on this on his return but he canceled the trip because he just has very little energy.  He denies orthopnea, paroxysmal nocturnal dyspnea or peripheral edema.  He has not had chest pains.    Blood pressure is high which she says is typical for him.    His lungs are clear but he does have a 3/6 systolic murmur at the apex radiating to the left axilla.    We talked about the potential of percutaneous intervention with mitral clip, no percutaneous intervention with mitral clip, or cardiac surgery.  I will discuss with Dr. Navarrete and Dr. Montano these options and it may be beneficial for this patient to meet with them simultaneously and the valve clinic to discuss the options directly.  Her graft thank you for allowing us to participate in his care.    Yeison Lawrence

## 2021-06-12 NOTE — PROGRESS NOTES
Cardiothoracic Surgery Consult    Date of Service: 7/25/2017    REFERRING CARDIOLOGIST: Dr. Yeison Lawrence    REASON FOR CONSULTATION: Mr. Mosqueda is an 81 year-old man with a history of severe multivessel coronary artery disease and myocardial infarction who is status post coronary artery bypass grafting and now has symptomatic severe mitral regurgitation.     HISTORY OF PRESENT ILLNESS:  Mr. Mosqueda is an 81 year-old man with a history of severe multivessel coronary artery disease and myocardial infarction who is status post coronary artery bypass grafting about 10 years ago. He has had mitral regurgitation for quite some time, but now it is worsening and his left ventricular function was diminished on an echocardiogram several months ago as well. He also now has occasional dizziness or lightheadedness. He is actually quite active, although he may be tapering back his activity subconsciously. His daughter suggests that this is the case, and maybe he is under reporting his symptoms. He was going to go to [place] on a trip and he decided not to because he knew he would not be able to hike very much. He denies chest pain.    PAST MEDICAL HISTORY:   Past Medical History:   Diagnosis Date     Coronary artery disease      High cholesterol      Hypertension      Mitral insufficiency      Myocardial infarction        PAST SURGICAL HISTORY:   Past Surgical History:   Procedure Laterality Date     CARDIAC CATHETERIZATION       CARDIAC CATHETERIZATION N/A 3/6/2017    Procedure: Coronary Angiogram;  Surgeon: Diaz Navarrete MD;  Location: [facility] Cath Lab;  Service:      CARDIAC CATHETERIZATION  03/27/2017     CARDIAC CATHETERIZATION       CORONARY ARTERY BYPASS GRAFT       CORONARY STENT PLACEMENT       [place] CABG, VEIN, SINGLE      Description: CABG (CABG);  Recorded: 07/24/2012;  Comments: LIMA to LAD, SVG to Intermediate     [place] CORONARY ART DIL,ONE VESSEL      Description: PTCA;  Recorded: 07/24/2012;  Comments:  Mid Cx       ALLERGIES:   Allergies   Allergen Reactions     Vicodin [Hydrocodone-Acetaminophen] Other (See Comments)     Urinary retention          CURRENT MEDICATIONS:  Current Outpatient Prescriptions on File Prior to Visit   Medication Sig Dispense Refill     amLODIPine (NORVASC) 10 MG tablet Take 10 mg by mouth daily.       atorvastatin (LIPITOR) 80 MG tablet Take 40 mg by mouth at bedtime.       CALCIUM ORAL Take 1 tablet by mouth daily.        furosemide (LASIX) 20 MG tablet Take 20 mg by mouth 2 (two) times a day at 9am and 6pm.        losartan (COZAAR) 100 MG tablet Take 100 mg by mouth daily.       metoprolol succinate (TOPROL-XL) 200 MG 24 hr tablet Take 100 mg by mouth daily.       omega-3 fatty acids-vitamin E (FISH OIL) 1,000 mg cap Take 1 capsule by mouth daily.       ACETAMINOPHEN/DP-HYDRAMINE (EXCEDRIN PM ORAL) Take 1 tablet by mouth bedtime.       aspirin 81 MG EC tablet Take 1 tablet (81 mg total) by mouth daily. (Patient taking differently: Take 81 mg by mouth every 3 (three) days. )  0     atorvastatin (LIPITOR) 20 MG tablet Take 40 mg by mouth.        clopidogrel (PLAVIX) 75 mg tablet Take 1 tablet (75 mg total) by mouth daily. 30 tablet 1     moxifloxacin (VIGAMOX) 0.5 % ophthalmic solution Place 1 Drop into left eye 4 times daily. Use in left eye until gone       nepafenac (NEVANAC) 0.1 % ophthalmic suspension Place 1 Drop into left eye 4 times daily. Use in left eye until gone       ofloxacin (OCUFLOX) 0.3 % ophthalmic solution Place 1 Drop into left eye. Use 4 times daily for 2 weeks then twice daily for 2 weeks       rosuvastatin (CRESTOR) 10 MG tablet Take 10 mg by mouth bedtime.       No current facility-administered medications on file prior to visit.          FAMILY HISTORY:   The family history was reviewed and is not pertinent to the patient's disease/illness    SOCIAL HISTORY:   Social History     Social History     Marital status:      Spouse name: N/A     Number of  "children: N/A     Years of education: N/A     Occupational History     Not on file.     Social History Main Topics     Smoking status: Former Smoker     Quit date: 12/21/1960     Smokeless tobacco: Not on file     Alcohol use 1.2 oz/week     1 Glasses of wine, 1 Shots of liquor per week      Comment: daily every evening     Drug use: Not on file     Sexual activity: Not on file     Other Topics Concern     Not on file     Social History Narrative       REVIEW OF SYSTEMS:  A complete 10 point review of systems was obtained and is negative other than the above stated complaints    PHYSICAL EXAMINATION:   Vitals: /90 (Patient Site: Right Arm, Patient Position: Sitting, Cuff Size: Adult Regular)  Pulse 72  Resp 16  Ht 5' 8\" (1.727 m)  Wt 188 lb (85.3 kg)  BMI 28.59 kg/m2  GENERAL: Elderly but quite fit  HEENT: Normocephalic,  conjunctiva anicteric and sclera clear   NECK: negative findings: no asymmetry, masses, or scars  CARDIOVASCULAR: Regular rate and rhythm  RESPIRATIONS: no tachypnea, retractions or cyanosis  ABDOMEN: normal findings: soft, non-tender  EXTREMITIES:negative and no deformity or swelling   NEUROLOGIC: intact and symmetric with no focal deficits.   PSYCHIATRIC: alert and oriented x3, pleasant       LABORATORY STUDIES:   Lab Results   Component Value Date    WBC 6.9 03/27/2017    HGB 14.2 03/27/2017    HCT 42.4 03/27/2017    MCV 96 03/27/2017     03/27/2017      Lab Results   Component Value Date    CREATININE 0.99 03/27/2017    BUN 15 03/27/2017     03/27/2017    K 3.8 03/27/2017     03/27/2017    CO2 27 03/27/2017     EKG:   Normal sinus rhythm with sinus arrhythmia   Left anterior fascicular block   Minimal voltage criteria for LVH, may be normal variant   Cannot rule out Anterior infarct (cited on or before 06-MAR-2017)   Abnormal ECG   When compared with ECG of 06-MAR-2017 07:19,   No significant change was found     CARDIAC CATHETERIZATION: This study was personally " reviewed by me  Left Main   The vessel exhibits minimal luminal irregularities.      Left Anterior Descending      Mid LAD lesion, 100% stenosed. The lesion is high risk.The lesion is considered a chronic total occlusion.      Left Circumflex      Mid Cx lesion, 60% stenosed. The lesion is not high risk.      Right Coronary Artery      Mid RCA lesion, 70% stenosed. The lesion is high risk.      Graft Angiography   Free LIMA Graft to Mid LAD   DEMPSEY The graft exhibits minimal luminal irregularities. The size of the target vessel is moderate. There is mild diffuse disease throughout the target vessel.      Vein Graft to Ramus   Vein The graft is large. The graft exhibits minimal luminal irregularities. The size of the target vessel is moderate. There is mild diffuse disease throughout the target vessel.               TRANSESOPHAGEAL ECHOCARDIOGRAM: This study was personally reviewed by me  Degenerative mitral valve disease with posterior leaflet prolapse (P3) with small P3 flailed segment (flail gap:0.4 cm, flailed width:0.9cm) and coaptation length of 5 mm.   Severe eccentric mitral regurgitation (4+) with effective regurgitant orifice area 0.3-0.4 cm , regurgitant volume is 72 ml and vena contracta area is 0.88 cm.   No mitral stenosis with mean gradient of 1 mmHg with heart rate of 57 bmp with estimate MVA: 4.6 cm2.  LVEF: 55%.  No pericardial effusion.  There is severe left atrial enlargement with >4 cm septal distance from mitral valve annulus in four chamber view.          IMPRESSION AND PLAN: Mr. Mosqueda is an 81 year-old man with a history of severe multivessel coronary artery disease and myocardial infarction who is status post coronary artery bypass grafting and now has symptomatic severe mitral regurgitation. He also has RCA stenosis and would need a single vessel bypass at the time of surgery. I discussed the option of mitral valve repair/replacement. I discussed the technical details of the procedure  with the patient, as well as the expected postoperative course and recovery. The risks include but are not limited to bleeding, infection, stroke, heart or graft failure, dysrhythmia, respiratory failure, kidney or liver injury, bowel or limb ischemia, and death. His calculated STS risk for mortality is around 4% (up to 7% with replacement) and the calculated risk of major morbidity or mortality is 26% with a 2% risk of stroke and a 7% risk of renal failure.     The patient is concerned about the operative risk and the prolonged recovery. His symptoms are not to severe, although they are limiting his activity. He was considered for MitraClip, but he cannot tolerate antiplatelet therapy, so this was not recommended. One option would be to do the MitraClip and not do antiplatelet therapy, accepting an unknown risk of stroke. I am going to discuss this with Dr. Navarrete and Dr. Lawernce. He is not willing to accept the risk of surgery and the recovery required. I understand this, but I told him if he has worsening heart failure, we will need to reconsider this option.      Thank you very much for this referral.       Hardeep Montano 7/25/2017 10:17 AM

## 2021-06-16 NOTE — TELEPHONE ENCOUNTER
Telephone Encounter by Aleta Weheler RN at 7/26/2019 12:29 PM     Author: Aleta Wheeler RN Service: -- Author Type: Registered Nurse    Filed: 7/26/2019 12:37 PM Encounter Date: 7/26/2019 Status: Signed    : Aleta Wheeler RN (Registered Nurse)       Nayeli Boston Erica J, RN   Caller: DAUGHTER EDGARDO (Today, 11:42 AM)             General phone call:     Caller: ed[facility], pts daughter   Primary cardiologist: dr tompkins   Detailed reason for call: patient was at [facility], and they gave him xerelto, but it is very expensive, wondering if we can help with that? Uses [facility] in [place]   New or active symptoms? n/a   Best phone number: [phone]   Best time to contact: any   Ok to leave a detailed message? yes   Device? no     Additional Info:          Called pt's daughter and offered a 30-day free supply card. Daughter agreeable to trying this - will send card in the mail. Also advised her to call pt's insurance company to determine if plavix, brilinta, or effient would be a cheaper option. -kcl

## 2021-06-16 NOTE — TELEPHONE ENCOUNTER
Telephone Encounter by Nida Smith RN at 9/9/2019 11:18 AM     Author: Nida Smith RN Service: -- Author Type: Registered Nurse    Filed: 9/9/2019 11:21 AM Encounter Date: 9/4/2019 Status: Signed    : Nida Smith RN (Registered Nurse)       Yeison Lawrence MD Blackledge, Erica J, RN   Caller: Unspecified (5 days ago,  8:40 AM)             I am okay with warfarin if his INRs are stable but if they are waxing and waning a significant amount, I would go back to a novel agent.     Thanks,     Yeison      Patient's daughter Myrna notified of Dr. Lawrence's response today. Advised that the INR goal should be 2.0-3.0 and that Warfarin is very dietary dependent. Xarelto is not Vitamin K dependent and therefore if he is not eating well, would be a better choice. She will keep this in mind, so far, they are doing well with INR's and they will work hard at this, as the Xarelto is higher in cost for him. sk/RN

## 2021-06-17 NOTE — PATIENT INSTRUCTIONS - HE
Patient Instructions by Jennifer Duncan NP at 11/8/2019  9:50 AM     Author: Jennifer Duncan NP Service: -- Author Type: Nurse Practitioner    Filed: 11/8/2019 11:49 AM Encounter Date: 11/8/2019 Status: Signed    : Jennifer Duncan NP (Nurse Practitioner)

## 2021-06-17 NOTE — PATIENT INSTRUCTIONS - HE
Patient Instructions by Debbie Mehta PA-C at 12/23/2019 12:50 PM     Author: Debbie Mehta PA-C Service: -- Author Type: Physician Assistant    Filed: 12/23/2019  1:34 PM Encounter Date: 12/23/2019 Status: Signed    : Debbie Mehta PA-C (Physician Assistant)       Jass Mosqueda,    It was a pleasure to see you today at the [facility].     My recommendations after this visit include:    - Stop ASA after dose on Dec 25    - if HR consistently high, would increase Toprol to 75 mg daily and come off the isordil - please bring record of HRs when you see Dr. Jackson    You should followup with Dr. Jackson on January 24 (with echo prior on Tom 15)      If you have questions or concerns, please call using the numbers below:    Valve Clinic Pool  [phone]    After Hours/Scheduling  [phone]    Otherwise you can dial the nurse directly at:    Ruddy Finnegan RN  Valve clinic coordinator  [phone]

## 2021-06-17 NOTE — PATIENT INSTRUCTIONS - HE
Patient Instructions by Debbie Mehta PA-C at 12/16/2019  9:50 AM     Author: Debbie Mehta PA-C Service: -- Author Type: Physician Assistant    Filed: 12/16/2019  9:57 AM Encounter Date: 12/16/2019 Status: Signed    : Debbie Mehta PA-C (Physician Assistant)       Jass Mosqueda,    It was a pleasure to see you today at the [facility].     - report for your MitraClip procedure on Wednesday morning at the instructed time      If you have questions or concerns, please call using the numbers below:    Valve Clinic Pool  [phone]    After Hours/Scheduling  [phone]    Otherwise you can dial the nurse directly at:    Ruddy Finnegan RN  Valve clinic coordinator  [phone]

## 2021-06-19 NOTE — LETTER
Letter by Ruddy Finnegan RN at      Author: Ruddy Finnegan RN Service: -- Author Type: --    Filed:  Encounter Date: 11/19/2019 Status: Signed         Patient: Jass Mosqueda   MR Number: [id number]   YOB: 1935   Date of Visit: 11/19/2019       [facility]    Dear Dr. [place]:      Thank you for your referral to [facility]. Jass Mosqueda (1935) was recently evaluated by our Heart Team for severe mitral regurgitation.      After assessing your patients diagnostic test results and conducting a thorough clinical, cardiovascular and surgical assessment, our Heart Team has recommended that your patient undergo Transcatheter Mitral Valve Repair with MitraClip.    The procedure is scheduled to take place on December 18, 2019 at [facility] with Dr. [place].     Patients typically return to Valve Clinic for follow up at 1 week, 30 days and one year post procedure.  Outside of these follow up points, you can expect to continue providing care to your patient.        Sincerely,     Dr. Diaz [place], Director, Structural Heart Program  Dr. Jose Jackson, Interventional Cardiology   Ruddy Finnegan RN, Valve Clinic Coordinator    [address], 1st [place] [address]  Phone [phone], Fax [phone]          CC  Berhane Krause PA-C

## 2021-06-19 NOTE — LETTER
Letter by Génesis Burger RN at      Author: Génesis Burger RN Service: -- Author Type: --    Filed:  Encounter Date: 9/11/2019 Status: (Other)         September 11, 2019     Patient: Jass Mosqueda   YOB: 1935   Date of Visit: 9/11/2019       To Whom It May Concern:    It is my medical opinion that Jass Mosqueda can proceed with dental procedure-- tooth extraction using local anesthetic. Patient may hold his Warfarin the day before his procedure if necessary.    If you have any questions or concerns, please don't hesitate to call.    Sincerely,        Génesis Burger RN on behalf of Yeison Lawrence MD  [phone]

## 2021-06-20 ENCOUNTER — HEALTH MAINTENANCE LETTER (OUTPATIENT)
Age: 86
End: 2021-06-20

## 2021-06-22 NOTE — TELEPHONE ENCOUNTER
----- Message from Yeison Lawrence MD sent at 1/2/2019  4:55 PM CST -----  Christine,    Could we send an order to the [facility] for a basic metabolic panel, B natruretic peptide, and his CBC.  Diagnosis is shortness of breath, severe mitral insufficiency and congestive heart failure.

## 2021-06-22 NOTE — TELEPHONE ENCOUNTER
Patient informed lab orders faxed over to [facility]. Patient verbalized understanding and will call to make appointment.

## 2021-06-23 NOTE — TELEPHONE ENCOUNTER
----- Message from Yeison Lawrence MD sent at 1/19/2019  1:36 PM CST -----  Would increase lasix to 60 mg/day (was on 40) and have him get BMP/BNP in 2 weeks. Should see primary in 2 weeks to see if breathing is better.    Yeison Nevarez  ----- Message -----  From: Christine Rosas RN  Sent: 1/10/2019  11:41 AM  To: Yeison Lawrence MD    External lab results in system. Patient informed test results normal, provider will review and if he has new orders or recommendations a return call will be made. Patient verbalized understanding and agrees with plan.        Please review and give any further recommendations.     ----- Message -----  From: Jerrica Harrison  Sent: 1/10/2019   8:04 AM  To: Christine Rosas RN

## 2021-06-23 NOTE — TELEPHONE ENCOUNTER
----- Message from Yeison Lawrence MD sent at 2/10/2019  2:48 PM CST -----  Génesis,    Would increase lasix but unsure of what dose he is currently taking?    Could you check?    Thank you,    Yeison  ----- Message -----  From: Jerrica Harrison  Sent: 2/6/2019   3:30 PM  To: Yeison Lawrence MD, Génesis Burger, RN

## 2021-06-23 NOTE — TELEPHONE ENCOUNTER
Message left with daughter, Myrna, for patient to call back to discuss recommendations per Dr. Lawrence.

## 2021-06-23 NOTE — TELEPHONE ENCOUNTER
Contacted patient to verify Lasix dose, patient requests that we contact his daughter's Alma Delia or Myrna as they set up his medications for hime.     Spoke with patients daughter Alma Delia, she reports patient is currently taking 60mg Lasix.

## 2021-06-24 NOTE — TELEPHONE ENCOUNTER
Patient's daughter, Alma Delia notified of medication recommendations per Dr. Lawrence- she verbalizes understanding and will have labs rechecked in 2 weeks. Order placed for BMP and BNP. -ejb    ----- Message -----  From: Yeison Lawrence MD  Sent: 2/12/2019   5:09 PM  To: Génesis Burger RN    Would increase to Lasix 80 mg in am. Recheck BMP/BNP in 2 weeks.    Thanks,    Yeison

## 2021-06-25 NOTE — PROGRESS NOTES
Progress Notes by Yeison Lawrence MD at 2/1/2017  2:30 PM     Author: Yeison Lawrence MD Service: -- Author Type: Physician    Filed: 2/1/2017  3:06 PM Encounter Date: 2/1/2017 Status: Signed    : Yeison Lawrence MD (Physician)           Click to link to [facility] NOTE    Thank you, Dr. Krause, for asking us to see Jass Mosqueda at the [facility].      Assessment/Recommendations   Patient with known coronary artery disease with distant bypass surgery who has Ace scar by nuclear study, reduced left ventricular systolic function and moderate to severe mitral insufficiency.  His functional capacity is diminished and I think further evaluation is warranted.  I would like to schedule him for a transesophageal echocardiogram and heart catheterization with coronary angiogram and graft angiography.  I'm hoping this could all be done on the same day to save him a trip to [place] and we could gather this information to decide what the next best step would be for potential therapy.    Thanks for allowing us to participate in his care.         History of Present Illness    Mr. Jass Mosqueda is a 81 y.o. male with known coronary artery disease and a history of bypass surgery.  He's been more short of breath and transthoracic echocardiogram showed moderate to severe mitral insufficiency and possibly severe.  He also had a stress nuclear study which showed a significant scar in the inferior lateral and basal anterior area with a left ventricle ejection fraction of 41%.  Myocardial ischemia was not noted.  He continues to get intermittent shortness of breath with activity and certainly more vigorous activity is somewhat restricted.  Some types of activity are tolerable while others are not.  He can sometimes get shortness of breath when he's resting.  He denies syncope or near syncopal episodes or peripheral edema.  He may have some orthopnea at  times.       Physical Examination Review of Systems   Vitals:    02/01/17 1441   BP: (!) 164/92   Pulse: 64   Resp: 16     Body mass index is 29.29 kg/(m^2).  Wt Readings from Last 3 Encounters:   02/01/17 187 lb (84.8 kg)   01/23/17 185 lb (83.9 kg)   12/21/16 185 lb (83.9 kg)     General Appearance:   Alert, cooperative and in no acute distress.   ENT/Mouth: Oral mucuos membranes pink and moist .      EYES:  No scleral icterus. No Xanthelasma.    Neck: JVP normal. No Hepatojugular reflux. Thyroid not visualizedly   Chest/Lungs:   Lungs are clear to auscultation, equal chest wall expansion    Cardiovascular:   S1, S2 with 3/6 systolic murmur heard best at the apex and radiating to the left axilla , no clicks or rubs. Brachial, radial  pulses are intact and symetric. No carotid bruits noted   Abdomen:  Nontender. BS+. No bruits.      Extremities: No cyanosis, clubbing or edema   Skin: no xanthelasma, warm.    Psych: Appropriate affect.   Neurologic: normal gait, normal  bilateral, no tremors        General: WNL  Eyes: WNL  Ears/Nose/Throat: WNL  Lungs: Shortness of Breath  Heart: WNL  Stomach: WNL  Bladder: WNL  Muscle/Joints: WNL  Skin: WNL  Nervous System: WNL  Mental Health: WNL     Blood: WNL     Medical History  Surgical History Family History Social History   Past Medical History   Diagnosis Date   ? Coronary artery disease    ? High cholesterol    ? Hypertension    ? Mitral insufficiency    ? Myocardial infarction     Past Surgical History   Procedure Laterality Date   ? Pr coronary art dil,one vessel       Description: PTCA;  Recorded: 07/24/2012;  Comments: Mid Cx   ? Pr cabg, vein, single       Description: CABG (CABG);  Recorded: 07/24/2012;  Comments: LIMA to LAD, SVG to Intermediate   ? Cardiac catheterization     ? Coronary stent placement     ? Coronary artery bypass graft      No family history on file. Social History     Social History   ? Marital status:      Spouse name: N/A   ? Number  of children: N/A   ? Years of education: N/A     Occupational History   ? Not on file.     Social History Main Topics   ? Smoking status: Former Smoker     Quit date: 12/21/1960   ? Smokeless tobacco: Not on file   ? Alcohol use 1.2 oz/week     1 Glasses of wine, 1 Shots of liquor per week      Comment: daily every evening   ? Drug use: Not on file   ? Sexual activity: Not on file     Other Topics Concern   ? Not on file     Social History Narrative          Medications  Allergies   Current Outpatient Prescriptions   Medication Sig Dispense Refill   ? ACETAMINOPHEN/DP-HYDRAMINE (EXCEDRIN PM ORAL) Take 1 tablet by mouth bedtime.     ? amLODIPine (NORVASC) 10 MG tablet Take 10 mg by mouth daily.     ? aspirin 325 MG tablet Take 325 mg by mouth 2 (two) times a week.     ? furosemide (LASIX) 20 MG tablet Take 20 mg by mouth daily.      ? losartan (COZAAR) 100 MG tablet Take 100 mg by mouth daily.     ? metoprolol succinate (TOPROL-XL) 200 MG 24 hr tablet Take 100 mg by mouth daily.     ? omega-3 fatty acids-vitamin E (FISH OIL) 1,000 mg cap Take 1 capsule by mouth daily.     ? rosuvastatin (CRESTOR) 10 MG tablet Take 10 mg by mouth bedtime.     ? saw palmetto 160 mg cap Take 1 capsule by mouth daily.     ? aspirin 81 mg chewable tablet Chew 81 mg daily.       No current facility-administered medications for this visit.       No Known Allergies      Lab Results    Chemistry/lipid CBC Cardiac Enzymes/BNP/TSH/INR   No results found for: CHOL, HDL, LDLCALC, TRIG, CREATININE, BUN, K, NA, CL, CO2 No results found for: WBC, HGB, HCT, MCV, PLT No results found for: CKTOTAL, CKMB, CKMBINDEX, TROPONINI, BNP, TSH, INR

## 2021-06-26 NOTE — PROGRESS NOTES
Progress Notes by Yeison Lawrence MD at 9/26/2018  3:30 PM     Author: Yeison Lawrence MD Service: -- Author Type: Physician    Filed: 9/26/2018  3:49 PM Encounter Date: 9/26/2018 Status: Signed    : Yeison Lawrence MD (Physician)           Click to link to [facility] HEART [facility] NOTE    Thank you, Dr. Krause, for asking us to see Jass Mosqueda at the [facility].      Assessment/Recommendations   Patient with known severe mitral insufficiency without good options for repair is he is had terrible problems with anticoagulation would not consider cardiac surgery.  He is comfortable with current medical therapy and his functional capacity is reasonable.  We will not make any changes in his medical therapy.  I would have a low threshold to go up on his antihypertensive agents and likely go up on Cozaar for his blood pressure creeps up.    I will plan on seeing him back in 3 months, but of course would be happy to see him sooner if questions or problems arise.    Thanks for allowing us to participate in his care.         History of Present Illness    Mr. Jass Mosqueda is a 82 y.o. male with known history of coronary artery disease, hypertension, and severe mitral insufficiency.  He had an attempt at a mitral clip and this was done at [facility].  Unfortunately the mitral clip did not work and was complicated by air embolus and cerebrovascular accident.  He is mostly recovered from this but it took quite some time.  He still continues to have severe MR and the clip is attached but not helping him at all.    He feels like his breathing is comfortable although he gets short of breath with activity he tells me he can walk up to a mile.  He has not had orthopnea or paroxysmal nocturnal dyspnea and his peripheral edema is been stable.  Overall he feels like he is doing well.  Denies chest pains.  Gets a little bit lightheaded sometimes when he stands  up quickly.         Physical Examination Review of Systems   Vitals:    09/26/18 1531   BP: 134/70   Pulse: 64   Resp: 14     Body mass index is 29.8 kg/(m^2).  Wt Readings from Last 3 Encounters:   09/26/18 196 lb (88.9 kg)   08/08/18 189 lb (85.7 kg)   07/24/17 188 lb (85.3 kg)     General Appearance:   Alert, cooperative and in no acute distress.   ENT/Mouth: Oral mucuos membranes pink and moist .      EYES:  No scleral icterus. No Xanthelasma.    Neck: JVP normal. No Hepatojugular reflux. Thyroid not visualized   Chest/Lungs:   Lungs are clear to auscultation, equal chest wall expansion    Cardiovascular:   S1, S2 with 2/6 systolic murmur , no clicks or rubs. Brachial, radial l pulses are intact and symetric. No carotid bruits noted   Abdomen:  Nontender. BS+.       Extremities: No cyanosis, clubbing mild bilateral pretibial edema   Skin: no xanthelasma, warm.    Psych: Appropriate affect.   Neurologic: normal gait, normal  bilateral, no tremors        General: WNL  Eyes: WNL  Ears/Nose/Throat: WNL  Lungs: WNL  Heart: WNL  Stomach: WNL  Bladder: WNL  Muscle/Joints: WNL  Skin: WNL  Nervous System: WNL  Mental Health: WNL     Blood: WNL     Medical History  Surgical History Family History Social History   Past Medical History:   Diagnosis Date   ? Coronary artery disease    ? High cholesterol    ? Hypertension    ? Mitral insufficiency    ? Myocardial infarction     Past Surgical History:   Procedure Laterality Date   ? CARDIAC CATHETERIZATION     ? CARDIAC CATHETERIZATION N/A 3/6/2017    Procedure: Coronary Angiogram;  Surgeon: Diaz [place] MD;  Location: [facility] Cath Lab;  Service:    ? CARDIAC CATHETERIZATION  03/27/2017   ? CARDIAC CATHETERIZATION     ? CORONARY ARTERY BYPASS GRAFT     ? CORONARY STENT PLACEMENT     ? AL CABG, VEIN, SINGLE      Description: CABG (CABG);  Recorded: 07/24/2012;  Comments: LIMA to LAD, SVG to Intermediate   ? AL CORONARY ART DIL,ONE VESSEL      Description: PTCA;   Recorded: 07/24/2012;  Comments: Mid Cx    No family history on file. Social History     Social History   ? Marital status:      Spouse name: N/A   ? Number of children: N/A   ? Years of education: N/A     Occupational History   ? Not on file.     Social History Main Topics   ? Smoking status: Former Smoker     Quit date: 12/21/1960   ? Smokeless tobacco: Never Used   ? Alcohol use 1.2 oz/week     1 Glasses of wine, 1 Shots of liquor per week      Comment: daily every evening   ? Drug use: Not on file   ? Sexual activity: Not on file     Other Topics Concern   ? Not on file     Social History Narrative          Medications  Allergies   Current Outpatient Prescriptions   Medication Sig Dispense Refill   ? ACETAMINOPHEN/DP-HYDRAMINE (EXCEDRIN PM ORAL) Take 1 tablet by mouth bedtime.     ? amLODIPine (NORVASC) 10 MG tablet Take 10 mg by mouth daily.     ? aspirin 81 MG EC tablet Take 1 tablet (81 mg total) by mouth daily. (Patient taking differently: Take 81 mg by mouth every 3 (three) days. )  0   ? ferrous gluconate 324 mg (36 mg iron) Tab Take 1 tablet by mouth.     ? finasteride (PROSCAR) 5 mg tablet Take 5 mg by mouth.     ? FLUoxetine (PROZAC) 20 MG capsule Take 20 mg by mouth.     ? furosemide (LASIX) 20 MG tablet Take 20 mg by mouth 2 (two) times a day at 9am and 6pm.      ? furosemide (LASIX) 20 MG tablet Take 40 mg by mouth.     ? losartan (COZAAR) 100 MG tablet Take 100 mg by mouth daily.     ? losartan (COZAAR) 25 MG tablet Take 25 mg by mouth.     ? metoprolol succinate (TOPROL-XL) 200 MG 24 hr tablet Take 100 mg by mouth daily.      ? tamsulosin (FLOMAX) 0.4 mg cap Take 0.4 mg by mouth.     ? atorvastatin (LIPITOR) 20 MG tablet Take 40 mg by mouth.      ? atorvastatin (LIPITOR) 80 MG tablet Take 40 mg by mouth at bedtime.     ? CALCIUM ORAL Take 1 tablet by mouth daily.      ? clopidogrel (PLAVIX) 75 mg tablet Take 1 tablet (75 mg total) by mouth daily. 30 tablet 1   ? moxifloxacin (VIGAMOX) 0.5 %  ophthalmic solution Place 1 Drop into left eye 4 times daily. Use in left eye until gone     ? nepafenac (NEVANAC) 0.1 % ophthalmic suspension Place 1 Drop into left eye 4 times daily. Use in left eye until gone     ? ofloxacin (OCUFLOX) 0.3 % ophthalmic solution Place 1 Drop into left eye. Use 4 times daily for 2 weeks then twice daily for 2 weeks     ? omega-3 fatty acids-vitamin E (FISH OIL) 1,000 mg cap Take 1 capsule by mouth daily.     ? rosuvastatin (CRESTOR) 10 MG tablet Take 10 mg by mouth bedtime.       No current facility-administered medications for this visit.       Allergies   Allergen Reactions   ? Vicodin [Hydrocodone-Acetaminophen] Other (See Comments)     Urinary retention         Lab Results    Chemistry/lipid CBC Cardiac Enzymes/BNP/TSH/INR   Lab Results   Component Value Date    CHOL 183 03/06/2017    HDL 51 03/06/2017    LDLCALC 119 03/06/2017    TRIG 65 03/06/2017    CREATININE 0.99 03/27/2017    BUN 15 03/27/2017    K 3.8 03/27/2017     03/27/2017     03/27/2017    CO2 27 03/27/2017    Lab Results   Component Value Date    WBC 6.9 03/27/2017    HGB 14.2 03/27/2017    HCT 42.4 03/27/2017    MCV 96 03/27/2017     03/27/2017    No results found for: CKTOTAL, CKMB, CKMBINDEX, TROPONINI, BNP, TSH, INR

## 2021-06-26 NOTE — PROGRESS NOTES
Progress Notes by Yeison Lawrence MD at 8/8/2018  3:30 PM     Author: Yeison Lawrence MD Service: -- Author Type: Physician    Filed: 8/9/2018  7:20 AM Encounter Date: 8/8/2018 Status: Signed    : Yeison Lawrence MD (Physician)           Click to link to [facility] HEART [facility] NOTE    Thank you, Dr. Krause, for asking us to see Jass Mosqueda at the [facility].      Assessment/Recommendations   She with known coronary artery disease who also has severe mitral insufficiency.  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  He is doing reasonably well from a functional capacity standpoint and does not have overt heart failure at this time.  I am a little suspicious that the dramatic peripheral edema is related to amlodipine as he does not have any pulmonary vascular congestion by auscultation.    Today we decided to not change any medications.  He will call us if he has changes in his functional capacity or changes in his breathing or gets worsening peripheral edema.  I would like to see him back in the clinic in about a month and I think I would be tempted to decrease the amlodipine at that time and potentially increase other medications for blood pressure as he needs low blood pressure because of the severe mitral insufficiency.    We reviewed all the procedures today and overall spent 45 minutes, primarily in counseling regarding the above issues.         History of Present Illness    Mr. Jass Mosqueda is a 82 y.o. male with known coronary artery disease who also had severe mitral insufficiency.  He was evaluated through our office but ultimately went to [facility] where they attempted to place a mitral clip.  The first mitral clip did not reduce the mitral insufficiency to any significant amount and while trying to place the second mitral  clip there was an air embolus.  There was reduced blood pressure and a suspicion of air embolus in his coronary arteries which was confirmed by coronary angiography and aspiration of the embolus was undertaken which was felt to be successful but he also had a cerebrovascular accident.  He had hemiparesis which much of which improved and went away but he still left with some weakness as well as some speech difficulties.  His most recent echocardiogram shows severe mitral insufficiency moderately increased LV size, ejection fraction of 55-60% and the mitral clip is felt to be detached from the posterior leaflet.  Patient is not particularly interested in any further procedures at this point but says he will just see how things go and deal with it.    He does not complain of orthopnea or paroxysmal nocturnal dyspnea but does have significant peripheral edema which she has had since undergoing the procedure in December 2017.  He does not have syncopal or near syncopal episodes but does feel a lightheaded feeling in a rush feeling particularly when he gets up from a sitting or lying position.  He is careful in this situation.  He does not complain of necessarily shortness of breath with activity but is whole body seems to play out.  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  Overall he feels reasonably comfortable with his functional capacity but of course would like to do more.  He has had a couple episodes of chest discomfort which have been self-limited.  One episode he was on his way to the emergency department but it went away in a car so he went home.  The other episode he did not tell anybody about it.        .     Physical Examination Review of Systems   Vitals:    08/08/18 1533   BP: 126/74   Pulse: 64   Resp: 16     Body mass index is 28.74 kg/(m^2).  Wt Readings from Last 3 Encounters:   08/08/18 189 lb (85.7 kg)   07/24/17 188 lb (85.3  kg)   06/28/17 189 lb (85.7 kg)     General Appearance:   Alert, cooperative and in no acute distress.   ENT/Mouth: Oral mucuos membranes pink and moist .      EYES:  No scleral icterus. No Xanthelasma.    Neck: JVP 9 cm.  Positive hepatojugular reflux. Thyroid not visualizedly   Chest/Lungs:   Lungs are clear to auscultation, equal chest wall expansion    Cardiovascular:   S1, S2 with over 6 systolic murmur , no clicks or rubs. Brachial, radial and posterior tibial pulses are intact and symetric. No carotid bruits noted   Abdomen:  Nontender. BS+.       Extremities: No cyanosis, clubbing bilateral pretibial pitting edema   Skin: no xanthelasma, warm.    Psych: Appropriate affect.   Neurologic:  Slow and measured gait, normal  bilateral, no tremors        General: WNL  Eyes: Visual Distubance  Ears/Nose/Throat: WNL  Lungs: WNL  Heart: Chest Pain, Arm Pain, Shortness of Breath with activity, Leg Swelling  Stomach: WNL  Bladder: Frequent Urination at Night  Muscle/Joints: Joint Pain  Skin: WNL  Nervous System: Falls  Mental Health: WNL     Blood: Easy Bleeding, Easy Bruising     Medical History  Surgical History Family History Social History   Past Medical History:   Diagnosis Date   ? Coronary artery disease    ? High cholesterol    ? Hypertension    ? Mitral insufficiency    ? Myocardial infarction     Past Surgical History:   Procedure Laterality Date   ? CARDIAC CATHETERIZATION     ? CARDIAC CATHETERIZATION N/A 3/6/2017    Procedure: Coronary Angiogram;  Surgeon: Diaz Navarrete MD;  Location: [facility] Cath Lab;  Service:    ? CARDIAC CATHETERIZATION  03/27/2017   ? CARDIAC CATHETERIZATION     ? CORONARY ARTERY BYPASS GRAFT     ? CORONARY STENT PLACEMENT     ? ND CABG, VEIN, SINGLE      Description: CABG (CABG);  Recorded: 07/24/2012;  Comments: LIMA to LAD, SVG to Intermediate   ? ND CORONARY ART DIL,ONE VESSEL      Description: PTCA;  Recorded: 07/24/2012;  Comments: Mid Cx    No family history on file.  Social History     Social History   ? Marital status:      Spouse name: N/A   ? Number of children: N/A   ? Years of education: N/A     Occupational History   ? Not on file.     Social History Main Topics   ? Smoking status: Former Smoker     Quit date: 12/21/1960   ? Smokeless tobacco: Never Used   ? Alcohol use 1.2 oz/week     1 Glasses of wine, 1 Shots of liquor per week      Comment: daily every evening   ? Drug use: Not on file   ? Sexual activity: Not on file     Other Topics Concern   ? Not on file     Social History Narrative          Medications  Allergies   Current Outpatient Prescriptions   Medication Sig Dispense Refill   ? amLODIPine (NORVASC) 10 MG tablet Take 10 mg by mouth daily.     ? aspirin 81 MG EC tablet Take 1 tablet (81 mg total) by mouth daily. (Patient taking differently: Take 81 mg by mouth every 3 (three) days. )  0   ? CALCIUM ORAL Take 1 tablet by mouth daily.      ? ferrous gluconate 324 mg (36 mg iron) Tab Take 1 tablet by mouth.     ? finasteride (PROSCAR) 5 mg tablet Take 5 mg by mouth.     ? FLUoxetine (PROZAC) 20 MG capsule Take 20 mg by mouth.     ? furosemide (LASIX) 20 MG tablet Take 40 mg by mouth.     ? losartan (COZAAR) 100 MG tablet Take 100 mg by mouth daily.     ? losartan (COZAAR) 25 MG tablet Take 25 mg by mouth.     ? metoprolol succinate (TOPROL-XL) 200 MG 24 hr tablet Take 100 mg by mouth daily.     ? tamsulosin (FLOMAX) 0.4 mg cap Take 0.4 mg by mouth.     ? ACETAMINOPHEN/DP-HYDRAMINE (EXCEDRIN PM ORAL) Take 1 tablet by mouth bedtime.     ? atorvastatin (LIPITOR) 20 MG tablet Take 40 mg by mouth.      ? atorvastatin (LIPITOR) 80 MG tablet Take 40 mg by mouth at bedtime.     ? clopidogrel (PLAVIX) 75 mg tablet Take 1 tablet (75 mg total) by mouth daily. 30 tablet 1   ? furosemide (LASIX) 20 MG tablet Take 20 mg by mouth 2 (two) times a day at 9am and 6pm.      ? moxifloxacin (VIGAMOX) 0.5 % ophthalmic solution Place 1 Drop into left eye 4 times daily. Use in  left eye until gone     ? nepafenac (NEVANAC) 0.1 % ophthalmic suspension Place 1 Drop into left eye 4 times daily. Use in left eye until gone     ? ofloxacin (OCUFLOX) 0.3 % ophthalmic solution Place 1 Drop into left eye. Use 4 times daily for 2 weeks then twice daily for 2 weeks     ? omega-3 fatty acids-vitamin E (FISH OIL) 1,000 mg cap Take 1 capsule by mouth daily.     ? rosuvastatin (CRESTOR) 10 MG tablet Take 10 mg by mouth bedtime.       No current facility-administered medications for this visit.       Allergies   Allergen Reactions   ? Vicodin [Hydrocodone-Acetaminophen] Other (See Comments)     Urinary retention         Lab Results    Chemistry/lipid CBC Cardiac Enzymes/BNP/TSH/INR   Lab Results   Component Value Date    CHOL 183 03/06/2017    HDL 51 03/06/2017    LDLCALC 119 03/06/2017    TRIG 65 03/06/2017    CREATININE 0.99 03/27/2017    BUN 15 03/27/2017    K 3.8 03/27/2017     03/27/2017     03/27/2017    CO2 27 03/27/2017    Lab Results   Component Value Date    WBC 6.9 03/27/2017    HGB 14.2 03/27/2017    HCT 42.4 03/27/2017    MCV 96 03/27/2017     03/27/2017    No results found for: CKTOTAL, CKMB, CKMBINDEX, TROPONINI, BNP, TSH, INR

## 2021-06-27 NOTE — PROGRESS NOTES
Progress Notes by Yeison Lawrence MD at 1/2/2019  4:30 PM     Author: Yeison Lawrence MD Service: -- Author Type: Physician    Filed: 1/2/2019  5:02 PM Encounter Date: 1/2/2019 Status: Signed    : Yeison Lawrence MD (Physician)           Click to link to [facility] Heart [facility] HEART [facility] NOTE    Thank you, Dr. Krause, for asking us to see Jass Mosqueda at the [facility].      Assessment/Recommendations   Patient with known severe mitral insufficiency which is severe, known coronary artery disease.  He is not willing to consider any further procedures and is had terrible complications with anticoagulants in the past and would not even consider that.  He is having more shortness of breath with activity I wonder if an increase in his diuretic would be beneficial.    We will send an order to collect a basic metabolic panel, a B natruretic peptide and a CBC and there were falls clinic in the next days.  Based on that we may want to increase his diuretic dose.    His blood pressures not optimal but he has had much difficulty controlling his blood pressure over the last many years.  Further diuretics could improve this as well.    Thank you for allowing us to participate in his care.         History of Present Illness    Mr. Jass Mosqueda is a 83 y.o. male with known mitral insufficiency who had an attempt at a mitral clip which failed and was associated with air embolus and cerebrovascular accidents.  He is made a fairly remarkable recovery but now is getting a little bit more short of breath.  The clip is still in but the mitral insufficiency is severe.  He denies orthopnea, paroxysmal nocturnal dyspnea or peripheral edema but has had more shortness of breath with physical activity.  He tried a drop in [place] tree with with a saw recently and once he got it down and made one cut he was all in and had to sit down and had a difficult time just getting back to the house  after that.  He also feels like he gets short of breath just with rest at times and it can last a minute or 2 and then is to improve.  He also noted that when he is pushing a wheelbarrow with would he gets more short of breath and will have to rest a couple of times going a couple couple 100 yards the bar into his house.  He is continues to burn wood for his heat.           Physical Examination Review of Systems   Vitals:    01/02/19 1627   BP: 140/90   Pulse: 66   Resp: 20     Body mass index is 30.26 kg/m .  Wt Readings from Last 3 Encounters:   01/02/19 199 lb (90.3 kg)   09/26/18 196 lb (88.9 kg)   08/08/18 189 lb (85.7 kg)     General Appearance:   Alert, cooperative and in no acute distress.   ENT/Mouth: Oral mucuos membranes pink and moist .      EYES:  No scleral icterus. No Xanthelasma.    Neck: JVP and centimeters.  Hepatojugular reflux. Thyroid not visualizedly   Chest/Lungs:   Lungs are clear to auscultation, equal chest wall expansion    Cardiovascular:   S1, S2 with over 6 systolic murmur , no clicks or rubs. Brachial, radial  pulses are intact and symetric. No carotid bruits noted   Abdomen:  Nontender. BS+. No bruits.      Extremities: No cyanosis, clubbing or edema   Skin: no xanthelasma, warm.    Psych: Appropriate affect.   Neurologic: normal gait, normal  bilateral, no tremors        General: WNL  Eyes: WNL  Ears/Nose/Throat: WNL  Lungs: Shortness of Breath  Heart: Shortness of Breath with activity  Stomach: WNL  Bladder: WNL  Muscle/Joints: Joint Pain  Skin: WNL  Nervous System: WNL  Mental Health: WNL     Blood: WNL     Medical History  Surgical History Family History Social History   Past Medical History:   Diagnosis Date   ? Coronary artery disease    ? High cholesterol    ? Hypertension    ? Mitral insufficiency    ? Myocardial infarction (H)     Past Surgical History:   Procedure Laterality Date   ? CARDIAC CATHETERIZATION     ? CARDIAC CATHETERIZATION N/A 3/6/2017    Procedure:  Coronary Angiogram;  Surgeon: Diaz Navarrete MD;  Location: [facility] Cath Lab;  Service:    ? CARDIAC CATHETERIZATION  2017   ? CARDIAC CATHETERIZATION     ? CORONARY ARTERY BYPASS GRAFT     ? CORONARY STENT PLACEMENT     ? SD CABG, VEIN, SINGLE      Description: CABG (CABG);  Recorded: 2012;  Comments: LIMA to LAD, SVG to Intermediate   ? SD CORONARY ART DIL,ONE VESSEL      Description: PTCA;  Recorded: 2012;  Comments: Mid Cx    No family history on file. Social History     Socioeconomic History   ? Marital status:      Spouse name: Not on file   ? Number of children: Not on file   ? Years of education: Not on file   ? Highest education level: Not on file   Social Needs   ? Financial resource strain: Not on file   ? Food insecurity - worry: Not on file   ? Food insecurity - inability: Not on file   ? Transportation needs - medical: Not on file   ? Transportation needs - non-medical: Not on file   Occupational History   ? Not on file   Tobacco Use   ? Smoking status: Former Smoker     Last attempt to quit: 1960     Years since quittin.0   ? Smokeless tobacco: Never Used   Substance and Sexual Activity   ? Alcohol use: Yes     Alcohol/week: 1.2 oz     Types: 1 Glasses of wine, 1 Shots of liquor per week     Comment: daily every evening   ? Drug use: Not on file   ? Sexual activity: Not on file   Other Topics Concern   ? Not on file   Social History Narrative   ? Not on file          Medications  Allergies   Current Outpatient Medications   Medication Sig Dispense Refill   ? aspirin 81 MG EC tablet Take 1 tablet (81 mg total) by mouth daily. (Patient taking differently: Take 81 mg by mouth every 3 (three) days. )  0   ? atorvastatin (LIPITOR) 20 MG tablet Take 40 mg by mouth.      ? atorvastatin (LIPITOR) 80 MG tablet Take 40 mg by mouth at bedtime.     ? ferrous gluconate 324 mg (36 mg iron) Tab Take 1 tablet by mouth.     ? finasteride (PROSCAR) 5 mg tablet Take 5 mg by  mouth.     ? FLUoxetine (PROZAC) 20 MG capsule Take 20 mg by mouth.     ? furosemide (LASIX) 20 MG tablet Take 20 mg by mouth 2 (two) times a day at 9am and 6pm.      ? furosemide (LASIX) 20 MG tablet Take 40 mg by mouth.     ? losartan (COZAAR) 100 MG tablet Take 100 mg by mouth daily.     ? losartan (COZAAR) 25 MG tablet Take 25 mg by mouth.     ? metoprolol succinate (TOPROL-XL) 200 MG 24 hr tablet Take 100 mg by mouth daily.      ? ACETAMINOPHEN/DP-HYDRAMINE (EXCEDRIN PM ORAL) Take 1 tablet by mouth bedtime.     ? amLODIPine (NORVASC) 10 MG tablet Take 10 mg by mouth daily.     ? CALCIUM ORAL Take 1 tablet by mouth daily.      ? clopidogrel (PLAVIX) 75 mg tablet Take 1 tablet (75 mg total) by mouth daily. 30 tablet 1   ? moxifloxacin (VIGAMOX) 0.5 % ophthalmic solution Place 1 Drop into left eye 4 times daily. Use in left eye until gone     ? nepafenac (NEVANAC) 0.1 % ophthalmic suspension Place 1 Drop into left eye 4 times daily. Use in left eye until gone     ? ofloxacin (OCUFLOX) 0.3 % ophthalmic solution Place 1 Drop into left eye. Use 4 times daily for 2 weeks then twice daily for 2 weeks     ? omega-3 fatty acids-vitamin E (FISH OIL) 1,000 mg cap Take 1 capsule by mouth daily.     ? rosuvastatin (CRESTOR) 10 MG tablet Take 10 mg by mouth bedtime.     ? tamsulosin (FLOMAX) 0.4 mg cap Take 0.4 mg by mouth.       No current facility-administered medications for this visit.       Allergies   Allergen Reactions   ? Vicodin [Hydrocodone-Acetaminophen] Other (See Comments)     Urinary retention         Lab Results    Chemistry/lipid CBC Cardiac Enzymes/BNP/TSH/INR   Lab Results   Component Value Date    CHOL 183 03/06/2017    HDL 51 03/06/2017    LDLCALC 119 03/06/2017    TRIG 65 03/06/2017    CREATININE 0.99 03/27/2017    BUN 15 03/27/2017    K 3.8 03/27/2017     03/27/2017     03/27/2017    CO2 27 03/27/2017    Lab Results   Component Value Date    WBC 6.9 03/27/2017    HGB 14.2 03/27/2017    HCT  42.4 03/27/2017    MCV 96 03/27/2017     03/27/2017    No results found for: CKTOTAL, CKMB, CKMBINDEX, TROPONINI, BNP, TSH, INR

## 2021-06-27 NOTE — PROGRESS NOTES
Progress Notes by Yeison Lawrence MD at 7/3/2019 12:50 PM     Author: Yeison Lawrence MD Service: -- Author Type: Physician    Filed: 7/3/2019  1:28 PM Encounter Date: 7/3/2019 Status: Signed    : Yeison Lawrence MD (Physician)           Click to link to [facility] HEART [facility] NOTE    Thank you, Dr. Krause, for asking us to see Jass Mosqueda at the [facility].      Assessment/Recommendations   Patient with known severe mitral insufficiency who does not want further procedures.  He does not want surgery as he has had complications with anticoagulants in the past and feels like he would not make it.  He would like to feel better however.    I have recommended that we increase his furosemide to 80 mg in the morning and 40 mg in the afternoon.  I have recommended that we get a basic metabolic panel and actually a complete metabolic panel as well as a BNP and a hemoglobin today.  I have discussed with him a low-sodium diet as he continues to add salt to his watermelon.    I have asked him to weigh himself daily and to record the weights.  I have asked him to call us on Monday to let us know if the increased diuretic is been successful in reducing his weight and improving his breathing.    We will try to get a follow-up visit with him either next week or the week thereafter.    Thank you for allowing us to participate in his care.         History of Present Illness    Mr. Jass Mosqueda is a 83 y.o. male with known severe mitral insufficiency who had an attempted mitral clip procedure at [facility] with complications of stroke and no improvement in his mitral insufficiency.  He has been worsening from a fluid standpoint and his breathing is become more problematic as well.  He feels short of breath just walking 100 feet out to the barn.  A year ago he could walk a mile with his girlfriend without any difficulty.  He denies orthopnea or  paroxysmal nocturnal dyspnea but has had significant peripheral edema.  He gets a pounding feeling in his chest but he has not had any chest discomfort recently.  He denies syncopal or near syncopal episodes.         Physical Examination Review of Systems   Vitals:    07/03/19 1254   BP: 138/86   Pulse: 80   Resp: 16     Body mass index is 30.43 kg/m .  Wt Readings from Last 3 Encounters:   07/03/19 195 lb (88.5 kg)   01/02/19 199 lb (90.3 kg)   09/26/18 196 lb (88.9 kg)     General Appearance:   Alert, cooperative and in no acute distress.   ENT/Mouth: Oral mucuos membranes pink and moist .      EYES:  No scleral icterus. No Xanthelasma.    Neck: JVP 14 cm.  Positive hepatojugular reflux. Thyroid not visualized   Chest/Lungs:   Lungs have crackles at the bases bilaterally, equal chest wall expansion    Cardiovascular:   S1, S2 with 3/6 systolic murmur , no clicks or rubs. Brachial, radial  pulses are intact and symetric. No carotid bruits noted   Abdomen:  Nontender. BS+.  Mildly protuberant      Extremities: No cyanosis, clubbing and bilateral pretibial pitting edema   Skin: no xanthelasma, warm.    Psych: Appropriate affect.   Neurologic:  Slow and deliberate gait, normal  bilateral, no tremors        General: Weight Gain  Eyes: Visual Distubance  Ears/Nose/Throat: WNL  Lungs: Cough, Shortness of Breath, Wheezing  Heart: Chest Pain, Shortness of Breath with activity, Irregular Heartbeat, Leg Swelling, Fainting  Stomach: WNL  Bladder: WNL  Muscle/Joints: WNL  Skin: WNL  Nervous System: Daytime Sleepiness, Dizziness, Loss of Balance  Mental Health: Confusion     Blood: Easy Bleeding, Easy Bruising     Medical History  Surgical History Family History Social History   Past Medical History:   Diagnosis Date   ? Coronary artery disease    ? High cholesterol    ? Hypertension    ? Mitral insufficiency    ? Myocardial infarction (H)     Past Surgical History:   Procedure Laterality Date   ? CARDIAC CATHETERIZATION      ? CARDIAC CATHETERIZATION N/A 3/6/2017    Procedure: Coronary Angiogram;  Surgeon: Diaz Navarrete MD;  Location: [facility] Cath Lab;  Service:    ? CARDIAC CATHETERIZATION  2017   ? CARDIAC CATHETERIZATION     ? CORONARY ARTERY BYPASS GRAFT     ? CORONARY STENT PLACEMENT     ? ND CABG, VEIN, SINGLE      Description: CABG (CABG);  Recorded: 2012;  Comments: LIMA to LAD, SVG to Intermediate   ? ND CORONARY ART DIL,ONE VESSEL      Description: PTCA;  Recorded: 2012;  Comments: Mid Cx    No family history on file. Social History     Socioeconomic History   ? Marital status:      Spouse name: Not on file   ? Number of children: Not on file   ? Years of education: Not on file   ? Highest education level: Not on file   Occupational History   ? Not on file   Social Needs   ? Financial resource strain: Not on file   ? Food insecurity:     Worry: Not on file     Inability: Not on file   ? Transportation needs:     Medical: Not on file     Non-medical: Not on file   Tobacco Use   ? Smoking status: Former Smoker     Last attempt to quit: 1960     Years since quittin.5   ? Smokeless tobacco: Never Used   Substance and Sexual Activity   ? Alcohol use: Yes     Alcohol/week: 1.2 oz     Types: 1 Glasses of wine, 1 Shots of liquor per week     Comment: daily every evening   ? Drug use: Not on file   ? Sexual activity: Not on file   Lifestyle   ? Physical activity:     Days per week: Not on file     Minutes per session: Not on file   ? Stress: Not on file   Relationships   ? Social connections:     Talks on phone: Not on file     Gets together: Not on file     Attends Sabianism service: Not on file     Active member of club or organization: Not on file     Attends meetings of clubs or organizations: Not on file     Relationship status: Not on file   ? Intimate partner violence:     Fear of current or ex partner: Not on file     Emotionally abused: Not on file     Physically abused: Not on file      Forced sexual activity: Not on file   Other Topics Concern   ? Not on file   Social History Narrative   ? Not on file          Medications  Allergies   Current Outpatient Medications   Medication Sig Dispense Refill   ? ACETAMINOPHEN/DP-HYDRAMINE (EXCEDRIN PM ORAL) Take 1 tablet by mouth bedtime.     ? amLODIPine (NORVASC) 10 MG tablet Take 1 tablet (10 mg total) by mouth daily. 90 tablet 3   ? aspirin 81 MG EC tablet Take 1 tablet (81 mg total) by mouth daily. (Patient taking differently: Take 81 mg by mouth every 3 (three) days. )  0   ? ferrous gluconate 324 mg (36 mg iron) Tab Take 1 tablet by mouth.     ? finasteride (PROSCAR) 5 mg tablet Take 5 mg by mouth.     ? FLUoxetine (PROZAC) 20 MG capsule Take 20 mg by mouth.     ? furosemide (LASIX) 40 MG tablet Take 1 tablet (40 mg total) by mouth daily. 90 tablet 3   ? losartan (COZAAR) 25 MG tablet Take 1 tablet (25 mg total) by mouth daily. 90 tablet 3   ? metoprolol succinate (TOPROL-XL) 200 MG 24 hr tablet Take 50 mg by mouth daily.            ? tamsulosin (FLOMAX) 0.4 mg cap Take 0.4 mg by mouth.     ? atorvastatin (LIPITOR) 20 MG tablet Take 40 mg by mouth.      ? atorvastatin (LIPITOR) 80 MG tablet Take 40 mg by mouth at bedtime.     ? CALCIUM ORAL Take 1 tablet by mouth daily.      ? clopidogrel (PLAVIX) 75 mg tablet Take 1 tablet (75 mg total) by mouth daily. 30 tablet 1   ? furosemide (LASIX) 20 MG tablet Take 20 mg by mouth 2 (two) times a day at 9am and 6pm.      ? losartan (COZAAR) 100 MG tablet Take 100 mg by mouth daily.     ? moxifloxacin (VIGAMOX) 0.5 % ophthalmic solution Place 1 Drop into left eye 4 times daily. Use in left eye until gone     ? nepafenac (NEVANAC) 0.1 % ophthalmic suspension Place 1 Drop into left eye 4 times daily. Use in left eye until gone     ? ofloxacin (OCUFLOX) 0.3 % ophthalmic solution Place 1 Drop into left eye. Use 4 times daily for 2 weeks then twice daily for 2 weeks     ? omega-3 fatty acids-vitamin E (FISH OIL)  1,000 mg cap Take 1 capsule by mouth daily.     ? rosuvastatin (CRESTOR) 10 MG tablet Take 10 mg by mouth bedtime.       No current facility-administered medications for this visit.       Allergies   Allergen Reactions   ? Vicodin [Hydrocodone-Acetaminophen] Other (See Comments)     Urinary retention         Lab Results    Chemistry/lipid CBC Cardiac Enzymes/BNP/TSH/INR   Lab Results   Component Value Date    CHOL 183 03/06/2017    HDL 51 03/06/2017    LDLCALC 119 03/06/2017    TRIG 65 03/06/2017    CREATININE 0.99 03/27/2017    BUN 15 03/27/2017    K 3.8 03/27/2017     03/27/2017     03/27/2017    CO2 27 03/27/2017    Lab Results   Component Value Date    WBC 6.9 03/27/2017    HGB 14.2 03/27/2017    HCT 42.4 03/27/2017    MCV 96 03/27/2017     03/27/2017    No results found for: CKTOTAL, CKMB, CKMBINDEX, TROPONINI, BNP, TSH, INR

## 2021-06-28 NOTE — PROGRESS NOTES
Progress Notes by Britney De Paz CNP at 9/6/2019  8:30 AM     Author: Britney De Paz CNP Service: -- Author Type: Nurse Practitioner    Filed: 9/6/2019  9:27 AM Encounter Date: 9/6/2019 Status: Signed    : Britney De Paz CNP (Nurse Practitioner)           Click to link to [facility] HEART CARE NOTE      Assessment/Recommendations   Assessment:    1.  Heart failure with reduced ejection fraction, ejection fraction 35 to 40%: Lung sounds are clear.  Lower extremity edema has improved over recent hospitalization.  He no longer has abdominal bloating.  He continues to have significant shortness of breath with activity.     2.  Atrial fibrillation: Heart rate controlled.  He is on a Lovenox bridge.  He continues to take warfarin and will have an INR checked next week.    3.  Severe mitral regurgitation: Failed MitraClip procedure in 2017 at [facility].  He will have a DC next week.  He would like to have another attempt at MitraClip.  He will see Dr. Navarrete in October.    4.  Coronary artery disease: History of MI.  Most recent stent in 2017.    5.  DVT: Continue to take warfarin with Lovenox bridge.    Plan:  1.  Continue current medications  2.  Low-sodium diet and daily weights  3.  We reviewed worsening heart failure symptoms to monitor for and when to call    Jass CHOPRA Triceemely will follow up with Dr. Lawrence on October 2.     History of Present Illness    Mr. Jass Mosqueda is a 83 y.o. male seen at [facility] heart failure clinic today for hospital follow-up.  He has a history of heart failure with reduced ejection fraction, coronary artery disease, atrial fibrillation, and severe mitral regurgitation.  He had a failed mitral clip procedure at [facility] in 2017 which was complicated with air embolus and stroke.  Echocardiogram from July 2019 showed ejection fraction of 35 to 40%, failed MitraClip and severe mitral  regurgitation.  He was hospitalized at [facility] September 3 to September 5 with acute heart failure.    He continues to have shortness of breath with minimal activity.  He denies any worsening since hospital discharge yesterday.  Lower extremity edema improved with hospitalization.  He no longer has abdominal bloating.  He continues to feel fatigued.  He denies lightheadedness, chest pain and abdominal fullness/bloating.      His home weight is now 192 pounds.  He struggles to follow a low-sodium diet.  He has 1 drink every evening which contains about 3s shots of liquor.  He has no interest in decreasing his alcohol intake.    ECHO:   Results for orders placed during the hospital encounter of 07/22/19   Echo Complete [ECH10] 07/23/2019    Narrative   Atrial fibrillation with controlled ventricular response.    Technically challenging especially from the apical windows.    Normal left ventricular size. Sigmoid septal hypertrophy.    Left ventricle ejection fraction is moderately reduced. Left ventricular   function systolic function estimated 35 to 40%.    Failed mitral valve clip suggested. Severe eccentric jet of mitral   regurgitation. Clip observed attached to the anterior mitral valve leaflet    Severe left atrial enlargement. Moderate right atrial enlargement.    Moderate tricuspid insufficiency. Estimate of RV systolic pressure 36   mmHg plus right atrial pressure    When compared to the previous study dated January 23, 2017. There is a   failed mitral valve clip observed attached to the anterior mitral valve   leaflet on the current study. Left ventricular systolic function appears   mildly less vigorous on the current study.           Physical Examination Review of Systems   Vitals:    09/06/19 0839   BP: 130/80   Pulse: 80   Resp: 28     Body mass index is 30.46 kg/m .  Wt Readings from Last 3 Encounters:   09/06/19 194 lb 8 oz (88.2 kg)   08/28/19 191 lb (86.6 kg)   07/25/19 185 lb 8 oz (84.1  kg)       General Appearance:     Alert, cooperative and in no acute distress.   ENT/Mouth: membranes moist, no oral lesions or bleeding gums.      EYES:  no scleral icterus, normal conjunctivae   Chest/Lungs:   lungs are clear to auscultation, no rales or wheezing, respirations unlabored   Cardiovascular:    Irregular. Normal first and second heart sounds, holosystolic murmur, no edema left lower extremity, 1+ edema right lower extremity   Abdomen:  Soft, nontender, nondistended, bowel sounds present   Extremities: no cyanosis or clubbing   Skin: warm, dry.    Neurologic: mood and affect are appropriate, alert and oriented x3      General: WNL  Eyes: WNL  Ears/Nose/Throat: WNL  Lungs: WNL  Heart: WNL  Stomach: WNL  Bladder: WNL  Muscle/Joints: WNL  Skin: WNL  Nervous System: WNL  Mental Health: WNL     Blood: WNL     Medical History  Surgical History Family History Social History   Past Medical History:   Diagnosis Date   ? Coronary artery disease    ? High cholesterol    ? Hypertension    ? Mitral insufficiency    ? Myocardial infarction (H)     Past Surgical History:   Procedure Laterality Date   ? CARDIAC CATHETERIZATION     ? CARDIAC CATHETERIZATION N/A 3/6/2017    Procedure: Coronary Angiogram;  Surgeon: Diaz Navarrete MD;  Location: [facility] Cath Lab;  Service:    ? CARDIAC CATHETERIZATION  03/27/2017   ? CARDIAC CATHETERIZATION     ? CORONARY ARTERY BYPASS GRAFT     ? CORONARY STENT PLACEMENT     ? CO CABG, VEIN, SINGLE      Description: CABG (CABG);  Recorded: 07/24/2012;  Comments: LIMA to LAD, SVG to Intermediate   ? CO CORONARY ART DIL,ONE VESSEL      Description: PTCA;  Recorded: 07/24/2012;  Comments: Mid Cx    No family history on file. Social History     Socioeconomic History   ? Marital status:      Spouse name: Not on file   ? Number of children: Not on file   ? Years of education: Not on file   ? Highest education level: Not on file   Occupational History   ? Not on file   Social Needs    ? Financial resource strain: Not on file   ? Food insecurity:     Worry: Not on file     Inability: Not on file   ? Transportation needs:     Medical: Not on file     Non-medical: Not on file   Tobacco Use   ? Smoking status: Former Smoker     Last attempt to quit: 1960     Years since quittin.7   ? Smokeless tobacco: Never Used   Substance and Sexual Activity   ? Alcohol use: Yes     Alcohol/week: 1.2 oz     Types: 1 Glasses of wine, 1 Shots of liquor per week     Drinks per session: 1 or 2     Comment: daily every evening   ? Drug use: Never   ? Sexual activity: Yes     Partners: Female   Lifestyle   ? Physical activity:     Days per week: Not on file     Minutes per session: Not on file   ? Stress: Not on file   Relationships   ? Social connections:     Talks on phone: Not on file     Gets together: Not on file     Attends Baptism service: Not on file     Active member of club or organization: Not on file     Attends meetings of clubs or organizations: Not on file     Relationship status: Not on file   ? Intimate partner violence:     Fear of current or ex partner: Not on file     Emotionally abused: Not on file     Physically abused: Not on file     Forced sexual activity: Not on file   Other Topics Concern   ? Not on file   Social History Narrative   ? Not on file          Medications  Allergies   Current Outpatient Medications   Medication Sig Dispense Refill   ? amLODIPine (NORVASC) 5 MG tablet Take 1 tablet (5 mg total) by mouth daily. 30 tablet 0   ? aspirin 81 MG EC tablet Take 1 tablet (81 mg total) by mouth daily.  0   ? diphenhydrAMINE-acetaminophen (TYLENOL PM EXTRA STRENGTH)  mg Tab Take 1 tablet by mouth at bedtime as needed.     ? enoxaparin (LOVENOX) 80 mg/0.8 mL syringe Inject 90 mg under the skin.     ? ferrous gluconate 324 mg (36 mg iron) Tab Take 1 tablet by mouth daily.            ? finasteride (PROSCAR) 5 mg tablet Take 5 mg by mouth daily.            ? FLUoxetine  (PROZAC) 20 MG capsule Take 20 mg by mouth daily.            ? furosemide (LASIX) 80 MG tablet Take 1 tablet (80 mg total) by mouth 2 (two) times a day. 60 tablet 0   ? losartan (COZAAR) 50 MG tablet Take 1 tablet (50 mg total) by mouth daily. 30 tablet 0   ? metoprolol succinate (TOPROL-XL) 200 MG 24 hr tablet Take 50 mg by mouth daily.            ? potassium chloride (KLOR-CON) 20 mEq packet Take 20 mEq by mouth 2 (two) times a day. 60 tablet 0   ? tamsulosin (FLOMAX) 0.4 mg cap Take 0.4 mg by mouth.     ? warfarin (COUMADIN/JANTOVEN) 5 MG tablet Take 7.5 mg by mouth.     ? rivaroxaban (XARELTO) 15 mg tablet Take 1 tablet (15 mg total) by mouth 2 (two) times a day with meals for 18 days. 35 tablet 0     No current facility-administered medications for this visit.       Allergies   Allergen Reactions   ? Morphine Other (See Comments)     hallucinations   ? Vicodin [Hydrocodone-Acetaminophen] Other (See Comments)     Urinary retention         Lab Results    Chemistry CBC BNP   Lab Results   Component Value Date    CREATININE 1.11 07/23/2019    BUN 20 07/23/2019     07/23/2019    K 3.9 07/25/2019     07/23/2019    CO2 28 07/23/2019     Creatinine (mg/dL)   Date Value   07/23/2019 1.11   07/22/2019 1.21   07/03/2019 1.20   03/27/2017 0.99    Lab Results   Component Value Date    WBC 6.1 07/22/2019    HGB 12.8 (L) 07/22/2019    HCT 37.8 (L) 07/22/2019    MCV 93 07/22/2019     (L) 07/24/2019    Lab Results   Component Value Date     (H) 07/22/2019     BNP (pg/mL)   Date Value   07/22/2019 649 (H)   07/03/2019 460 (H)            Britney De Paz, [facility]   Heart Failure Clinic

## 2021-06-28 NOTE — PROGRESS NOTES
Progress Notes by Debbie Mehta PA-C at 12/16/2019  9:50 AM     Author: Debbie Mehta PA-C Service: -- Author Type: Physician Assistant    Filed: 12/16/2019 10:55 AM Encounter Date: 12/16/2019 Status: Signed    : Debbie Mehta PA-C (Physician Assistant)           Assessment/Recommendations   Problem List Items Addressed This Visit     Essential hypertension (Chronic)    CAD (coronary artery disease) (Chronic)    Severe mitral regurgitation - Primary (Chronic)    Persistent atrial fibrillation (Chronic)    Chronic systolic congestive heart failure (H)          1.  Mitral Regurgitation: s/p attempted MitraClip at outside facility with SLDAthis has become severe and is now causing significant dyspnea on exertion, some shortness of breath at rest and LE edema, resulting in NYHA class IV heart failure.  He has been admitted with acute HF symptoms recently.  He has been re-evaluated by our multidisciplinary team and it has been determined that he is not a good surgical candidate, given his age, low EF and previous sternotomy.  He is therefore a better candidate for transcatheter mitral valve repair with MitraClip.       Details of the procedure were discussed with the patient and there is understanding of the risks and benefits.  Plan will be GA with DC monitoring  All questions were answered   Consents were discussed and will be [place] [facility] [place] of [place], given new consents roll out tomorrow.  He has no prior history of trouble with anesthesia.  Labs today reveal Hgb 13, normal WBC, electrolytes WNL and creat 1.48 (baseline creat around 1.3)     Jass Mosqueda will report Wednesday morning for the procedure and all instructions regarding times and medications were given by MitraClip coordinator RN.     Of note -patient and I discussed current DNR status.  We would normally like patient to be full code for this post procedure for at least 30 days post, but patient  is unwilling to do this at this time.  I will inform Dr. Navarrete and Dr. Jackson of his decision.    2.  Ischemic cardiomyopathy, heart failure with reduced ejection fraction, NYHA class IV: Now with shortness of breath at rest, with speaking and with very minimal activity.  He essentially gets up to go to the bathroom but not much else.  Currently on Bumex 2 mg twice daily with improvement in lower extremity edema since switching from furosemide to Bumex.  He should also continue taking ARB, beta-blocker, hydralazine and isosorbide.  Most recent EF 30%.    3.  Coronary artery disease: s/p CABG in past with patent L-LAD and SVG-ramus.  Status post PCI with drug-eluting stent to RCA on November 13.  Patient currently taking aspirin and Brilinta, but will be switched over to Plavix once Brilinta is gone because of cost.  He should continue aspirin through December 25, then stop and continue just Plavix along with his warfarin.  Currently taking high intensity statin with atorvastatin 80 mg daily and beta-blocker.  Last lipid profile was March 2017 with .  He denies angina currently     4.  Hypertension: Blood pressure is at goal today.  He should continue taking hydralazine 10 mg 3 times daily, isosorbide 10 mg twice daily, losartan 50 mg at bedtime and metoprolol succinate 50 mg daily    5.  Chronic atrial fibrillation: Patient rate controlled on metoprolol and normally anticoagulated with warfarin.  Warfarin currently on hold for anticipated procedure.  Patient has easy bruising/bleeding.  INR today 1.51       History of Present Illness/Subjective    Jass Mosqueda is a 84 y.o. male who comes in today for history and physical prior to MitraClip implantation.  He comes in with his daughter.     Kingsley is a very pleasant gentleman with history of severe mitral regurgitation, chronic atrial fibrillation, coronary artery disease, CABG in the past, hypertension, ischemic cardiomyopathy and chronic systolic  heart failure.  He underwent MitraClip implantation on 2017 at [facility], complicated by air embolus, CVA and DVT requiring IVC filter.  He was eventually transferred to [facility] for inpatient rehab and was also noted on follow-up echo to have STLD of his mitral clip.    Late this fall, patient was having increased dyspnea on exertion and saw Dr. Navarrete on  because of interest in another attempt at transcatheter mitral valve repair.  He was planned for staged PCI of the RCA, but unfortunately was admitted with shortness of breath and acute heart failure exacerbation.  On that admission, he went to Cath Lab and had drug-eluting stent placed to his RCA with no intervention done to the circumflex .  He then finished the rest of his work-up towards redo MitraClip implantation.    Jass Mosqueda tells me that his shortness of breath has progressed even in the last 2 to 3 weeks.  He is now short of breath just talking to me.  He has stopped going to Spiritism and pretty much only gets up to go to the bathroom at his house because the shortness of breath is so bad.  He feels dizzy most of the time.  He denies chest discomfort, palpitations, paroxysmal nocturnal dyspnea, orthopnea, pre-syncope, or syncope.  Jass Mosqueda also denies any weight loss, changes in appetite, nausea or vomiting.     Medical, surgical, family, social history, and medications were reviewed and updated as necessary.  He had a fall a week ago Saturday and injured his left hand.  It is still bruised with semi-open sore, but swelling has improved.  Also notes that he was bit by a spider sometime the end of November and has some residual bruising/hard lump on his upper right thigh.    EC2019 shows atrial fibrillation with ventricular rate 90 bpm    ECHOCARDIOGRAM done on 2019:    Left ventricle is moderately dilated wtih moderate concentric hypertrophy.    Left ventricle ejection  fraction is moderately decreased. The calculated left ventricular ejection fraction is 30%.    The right ventricle is mildly dilated. The systolic function is mildly reduced. TAPSE is abnormal, which is consistent with abnormal right ventricular systolic function.    The following structural abnormalities were observed: MitraClip. A MitraClip is visualized attached to the anterior leaflet of the mitral valve that is detached from the posterior leaflet. There is mild valve leaflet thickening. No mitral stenosis present. Severe mitral regurgitation.    Moderate pulmonary hypertension present. The estimated systolic pulmonary artery pressure is 51 mmhg.    When compared to the previous study dated 9/13/2019, left ventricular ejection fraction has declined from 40% down to 30%.    CATH done on November 13, 2019:  Findings:  LM:no obstruction, Ca2+  LAD:severe diffuse disease throughout proximal vessel, w/ only small-moderate diagonal branches supplied antegrade, fills distally via patent L-LAD  Lcx:now occluded proximally and consistent w/ , w/ only a small AV Lcx supplied antegrade. Majority of the vessel fills via patent V-OM1/RI, however portion feeding the Lcx has severe diffuse disease 70-80%  RCA:dominant, Ca2+, ectasia/aneurysm prior to a 90% narrowing in proximal 1/3, diffuse 60-70% disease in distal vessel hasn't changed since '17 angio          Physical Examination Review of Systems   Vitals:    12/16/19 0944   BP: 126/80   Pulse: 90   Resp: 28     Body mass index is 27.87 kg/m .  Wt Readings from Last 3 Encounters:   12/16/19 183 lb 4.8 oz (83.1 kg)   12/02/19 185 lb 4 oz (84 kg)   11/29/19 182 lb (82.6 kg)       General Appearance:   Alert, cooperative and in no acute distress   ENT/Mouth: membranes moist, no oral lesions or bleeding gums.      EYES:  no scleral icterus, normal conjunctivae   Neck: no carotid bruits.  Thyroid not visualized   Chest/Lungs:   lungs are clear to auscultation, no rales or  wheezing   Cardiovascular:   irregular. Normal first and second heart sounds with 4/6 systolic murmur.  No rubs or gallops; the carotid, radial and posterior tibial pulses are intact, no edema bilaterally    Abdomen:  Soft and nontender. Bowel sounds are present in all quadrants   Extremities: no cyanosis or clubbing   Skin: no xanthelasma, warm.    Neurologic: normal gait, normal  bilateral, no tremors   Psychiatric: Normal mood and affect    General: WNL  Eyes: WNL  Ears/Nose/Throat: Nosebleeds  Lungs: WNL  Heart: Shortness of Breath with activity  Stomach: WNL  Bladder: WNL  Muscle/Joints: Muscle Weakness  Skin: WNL  Nervous System: Loss of Balance  Mental Health: WNL     Blood: WNL     Medical History  Surgical History Family History Social History   Past Medical History:   Diagnosis Date   ? Acute deep vein thrombosis (DVT) of femoral vein of right lower extremity (H)    ? Acute deep vein thrombosis (DVT) of popliteal vein of right lower extremity (H)    ? Acute myocardial infarction (H) 11/10/2007   ? Acute posthemorrhagic anemia 11/10/2007   ? JANELLE (acute kidney injury) (H) 12/9/2017   ? Atrial fibrillation (H)    ? Combined forms of age-related cataract 6/22/2017   ? Coronary artery disease    ? DNAR (do not attempt resuscitation)    ? 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 Hypertension    ? Mitral insufficiency    ? Myocardial infarction (H) 2017    Past Surgical History:   Procedure Laterality Date   ? CARDIAC CATHETERIZATION N/A 3/6/2017    Coronary Angiogram; Diaz Navarrete MD; [facility]   ? CARDIAC CATHETERIZATION  03/27/2017   ? CORONARY ANGIOPLASTY      mid LCX   ? CORONARY ARTERY BYPASS GRAFT  2012    LIMA to LAD, SVG to Intermediate   ? CORONARY STENT PLACEMENT     ? CV CORONARY ANGIOGRAM N/A 11/13/2019    Procedure: Coronary Angiogram;  Surgeon: Jose Jackson MD;  Location: [facility] Cath Lab;  Service: Cardiology   ? CV LEFT HEART CATHETERIZATION WO LEFT VETRICULOGRAM  Left 2019    Procedure: Left Heart Catheterization Without Left Ventriculogram;  Surgeon: Jose Jackson MD;  Location: [facility] Cath Lab;  Service: Cardiology    No family history on file. Social History     Socioeconomic History   ? Marital status:      Spouse name: Not on file   ? Number of children: Not on file   ? Years of education: Not on file   ? Highest education level: Not on file   Occupational History     Employer: RETIRED   Social Needs   ? Financial resource strain: Not on file   ? Food insecurity:     Worry: Not on file     Inability: Not on file   ? Transportation needs:     Medical: Not on file     Non-medical: Not on file   Tobacco Use   ? Smoking status: Former Smoker     Last attempt to quit: 1960     Years since quittin.0   ? Smokeless tobacco: Never Used   Substance and Sexual Activity   ? Alcohol use: Yes     Alcohol/week: 2.0 standard drinks     Types: 1 Glasses of wine, 1 Shots of liquor per week     Drinks per session: 1 or 2     Comment: daily every evening   ? Drug use: Never   ? Sexual activity: Yes     Partners: Female   Lifestyle   ? Physical activity:     Days per week: Not on file     Minutes per session: Not on file   ? Stress: Not on file   Relationships   ? Social connections:     Talks on phone: Not on file     Gets together: Not on file     Attends Catholic service: Not on file     Active member of club or organization: Not on file     Attends meetings of clubs or organizations: Not on file     Relationship status: Not on file   ? Intimate partner violence:     Fear of current or ex partner: Not on file     Emotionally abused: Not on file     Physically abused: Not on file     Forced sexual activity: Not on file   Other Topics Concern   ? Not on file   Social History Narrative   ? Not on file          Medications  Allergies   Current Outpatient Medications   Medication Sig Dispense Refill   ? aspirin 81 mg chewable tablet Chew 1 tablet (81 mg  total) daily. 30 tablet 0   ? atorvastatin (LIPITOR) 80 MG tablet Take 1 tablet (80 mg total) by mouth at bedtime. 30 tablet 11   ? bumetanide (BUMEX) 2 MG tablet Take 1 tablet (2 mg total) by mouth 2 (two) times a day at 9am and 6pm. 90 tablet 0   ? clopidogrel (PLAVIX) 75 mg tablet Take 1 tablet (75 mg total) by mouth daily. 90 tablet 3   ? finasteride (PROSCAR) 5 mg tablet Take 5 mg by mouth daily.            ? FLUoxetine (PROZAC) 20 MG capsule Take 20 mg by mouth daily.            ? hydrALAZINE (APRESOLINE) 10 MG tablet Take 1 tablet (10 mg total) by mouth every 8 (eight) hours. 90 tablet 0   ? isosorbide dinitrate (ISORDIL) 10 MG tablet Take 1 tablet (10 mg total) by mouth 2 times a day at 9:00am and 5:00pm. 90 tablet 0   ? losartan (COZAAR) 50 MG tablet Take 1 tablet (50 mg total) by mouth at bedtime. 30 tablet 0   ? metoprolol succinate (TOPROL-XL) 50 MG 24 hr tablet Take 50 mg by mouth daily.            ? potassium chloride (KLOR-CON) 20 mEq packet Take 20 mEq by mouth 2 (two) times a day. 180 packet 1   ? tamsulosin (FLOMAX) 0.4 mg cap Take 0.4 mg by mouth Daily after lunch.            ? warfarin (COUMADIN/JANTOVEN) 5 MG tablet Take 2.5 mg by mouth daily.              No current facility-administered medications for this visit.     Allergies   Allergen Reactions   ? Morphine Other (See Comments)     hallucinations   ? Vicodin [Hydrocodone-Acetaminophen] Other (See Comments)     Urinary retention         Lab Results    Chemistry/lipid CBC Cardiac Enzymes/BNP/TSH/INR   Lab Results   Component Value Date    CHOL 183 03/06/2017    HDL 51 03/06/2017    LDLCALC 119 03/06/2017    TRIG 65 03/06/2017    CREATININE 1.48 (H) 12/16/2019    BUN 30 (H) 12/16/2019    K 4.3 12/16/2019     12/16/2019     12/16/2019    CO2 28 12/16/2019    Lab Results   Component Value Date    WBC 5.3 12/16/2019    HGB 13.0 (L) 12/16/2019    HCT 40.9 12/16/2019    MCV 97 12/16/2019     12/16/2019    Lab Results   Component  Value Date    TROPONINI 0.04 11/09/2019     (H) 12/16/2019    TSH 1.48 07/22/2019    INR 1.51 (H) 12/16/2019          40 minutes were spent with the patient with greater than 50% spent on education and counseling.    This note has been dictated using voice recognition software. Any grammatical or context distortions are unintentional and inherent to the software.

## 2021-06-28 NOTE — PROGRESS NOTES
Progress Notes by Britney De Paz CNP at 11/29/2019 12:50 PM     Author: Britney De Paz CNP Service: -- Author Type: Nurse Practitioner    Filed: 11/29/2019  1:48 PM Encounter Date: 11/29/2019 Status: Signed    : Britney De Paz CNP (Nurse Practitioner)             Assessment/Recommendations   1.  Coronary artery disease: Coronary angiogram on November 13 with drug-eluting stent to RCA.  He is on triple therapy with aspirin, Brilinta, and warfarin.  His daughter would like to change Brilinta to Plavix due to cost.  I will discuss with Dr. Jackson.      2.  Dyslipidemia: Jass Mosqueda is on high intensity statin therapy with atorvastatin 80 mg daily.      3.  Ischemic cardiomyopathy, heart failure with reduced ejection fraction: Ejection fraction 30%.  He has chronic shortness of breath with minimal activity.  Lower extremity edema has improved significantly.  He only has trace edema today.    4.  Hypertension: Blood pressure controlled today at 124/78.    5.  Severe mitral regurgitation: He is scheduled for MitraClip on December 18.    6.  Atrial fibrillation: He will have an INR checked next week.       History of Present Illness/Subjective    Jass Mosqueda is seen at [facility] for post coronary intervention follow up.  He has a history of atrial fibrillation, severe mitral regurgitation, ischemic cardiomyopathy, heart failure with reduced ejection fraction, coronary artery bypass surgery, dyslipidemia, hypertension, CVA, and DVT.  He was hospitalized November 8 to November 15 with acute heart failure exacerbation.  Coronary angiogram on November 13 with drug-eluting stent to RCA.  He is on triple therapy with aspirin, Brilinta, and warfarin.      He is scheduled for MitraClip on December 18.    He has chronic shortness of breath with activity but denies any worsening since discharge.  Lower extremity edema has improved significantly since switching  to Bumex.  He denies chest pain.      Home weight has been stable between 179 to 182 pounds.      Echo 10/18/19:    Left ventricle is moderately dilated wtih moderate concentric hypertrophy.    Left ventricle ejection fraction is moderately decreased. The calculated left ventricular ejection fraction is 30%.    The right ventricle is mildly dilated. The systolic function is mildly reduced. TAPSE is abnormal, which is consistent with abnormal right ventricular systolic function.    The following structural abnormalities were observed: MitraClip. A MitraClip is visualized attached to the anterior leaflet of the mitral valve that is detached from the posterior leaflet. There is mild valve leaflet thickening. No mitral stenosis present. Severe mitral regurgitation.    Moderate pulmonary hypertension present. The estimated systolic pulmonary artery pressure is 51 mmhg.    When compared to the previous study dated 9/13/2019, left ventricular ejection fraction has declined from 40% down to 30%.    Results for orders placed during the hospital encounter of 11/08/19   Cardiac Catheterization [CATH01] 11/13/2019    Narrative Jass Mosqueda is a 84 y.o. old male with severe MR s/p SLAD (failed)   at OSH, CAD s/p prior CABG w/ L-LAD, V-OM, V-RCA, cardiomyopathy EF 35-40,   AF, CKD, who is here for acute decompensated heart failure and evaluation   prior to likely another MitraClip repair.    - severe lesion in pRCA s/p DESx1, patent L-LAD and V-RI/OM; Lcx now   occluded and fills via diseased segment through a V-RI  - normal LVEDP  - proceed w/ planning outpatient MitraClip attempt  - consider PCI to Lcx  at some point in the future, depending on   clinical progress  - ASA 81mg daily indefinitely, ticagrelor 180mg once, followed by 90mg   twice daily for at least 12 months  - add atorva 80  - continue aggressive risk factor modification        Findings:  LM:no obstruction, Ca2+  LAD:severe diffuse disease throughout  proximal vessel, w/ only   small-moderate diagonal branches supplied antegrade, fills distally via   patent L-LAD  Lcx:now occluded proximally and consistent w/ , w/ only a small AV Lcx   supplied antegrade. Majority of the vessel fills via patent V-OM1/RI,   however portion feeding the Lcx has severe diffuse disease 70-80%  RCA:dominant, Ca2+, ectasia/aneurysm prior to a 90% narrowing in proximal   1/3, diffuse 60-70% disease in distal vessel hasn't changed since '17   angio    Grafts:  L-LAD: patent  V-OM/RI: patent, fills majority of Lcx territory now, however portion   feeding the Lcx has severe diffuse disease 70-80%    LVEDP:11    Access:  L Radial artery for KATHI engagement  R Femoral artery    PCI:  RCA was engaged w/ a 6F JR4 Guide catheter and the lesion in RCA was wired   w/ a Forte wire, ballooned w/ a 2.5x12mm Emerge balloon, stented w/ a   3.0x20mm Synergy EES at 11 jose, and post-dilated w/ a 3.32u61be NC Emerge   balloon at 12 jose. Final angiography showed no dissection or perforation   and a ERWIN 3 flow.    Closure:   Manual and Vasc Band                Physical Examination Review of Systems   Vitals:    11/29/19 1308   BP: 124/78   Pulse: 64   Resp: 18     Body mass index is 27.67 kg/m .  Wt Readings from Last 3 Encounters:   11/29/19 182 lb (82.6 kg)   11/15/19 178 lb 14.4 oz (81.1 kg)   11/08/19 190 lb (86.2 kg)       General Appearance:     Alert, cooperative and in no acute distress.   ENT/Mouth: membranes moist, no oral lesions or bleeding gums.      EYES:  no scleral icterus, normal conjunctivae   Chest/Lungs:   lungs are clear to auscultation, no rales or wheezing, respirations unlabored   Cardiovascular:    Irregular. Normal first and second heart sounds, murmur, trace edema bilateral lower extremities    Abdomen:  Soft, nontender, nondistended, bowel sounds present   Extremities: no cyanosis or clubbing   Skin:  Neurologic: warm, dry.  mood and affect are appropriate, alert and oriented  x3     Puncture Site: Left radial site is soft with healing bruise.  He reports no pain or tenderness of right femoral puncture site.  Radial pulses and Pedal pulses intact and symmetrical.  CMS intact.        General: WNL  Eyes: WNL  Ears/Nose/Throat: WNL  Lungs: Shortness of Breath  Heart: WNL  Stomach: WNL  Bladder: WNL  Muscle/Joints: WNL  Skin: WNL  Nervous System: Dizziness, Loss of Balance  Mental Health: WNL     Blood: Easy Bruising     Medical History  Surgical History Family History Social History   Past Medical History:   Diagnosis Date   ? Acute deep vein thrombosis (DVT) of femoral vein of right lower extremity (H)    ? Acute deep vein thrombosis (DVT) of popliteal vein of right lower extremity (H)    ? Acute myocardial infarction (H) 11/10/2007   ? Acute posthemorrhagic anemia 11/10/2007   ? JANELLE (acute kidney injury) (H) 12/9/2017   ? Atrial fibrillation (H)    ? Combined forms of age-related cataract 6/22/2017   ? Coronary artery disease    ? DNAR (do not attempt resuscitation)    ? 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 Hypertension    ? Mitral insufficiency    ? Myocardial infarction (H) 2017    Past Surgical History:   Procedure Laterality Date   ? CARDIAC CATHETERIZATION N/A 3/6/2017    Coronary Angiogram; Diaz Navarrete MD; [facility]   ? CARDIAC CATHETERIZATION  03/27/2017   ? CORONARY ANGIOPLASTY      mid LCX   ? CORONARY ARTERY BYPASS GRAFT  2012    LIMA to LAD, SVG to Intermediate   ? CORONARY STENT PLACEMENT     ? CV CORONARY ANGIOGRAM N/A 11/13/2019    Procedure: Coronary Angiogram;  Surgeon: Jose Jackson MD;  Location: [facility];  Service: Cardiology   ? CV LEFT HEART CATHETERIZATION WO LEFT VETRICULOGRAM Left 11/13/2019    Procedure: Left Heart Catheterization Without Left Ventriculogram;  Surgeon: Jose Jackson MD;  Location: [facility];  Service: Cardiology    No family history on file. Social History     Socioeconomic History   ?  Marital status:      Spouse name: Not on file   ? Number of children: Not on file   ? Years of education: Not on file   ? Highest education level: Not on file   Occupational History     Employer: RETIRED   Social Needs   ? Financial resource strain: Not on file   ? Food insecurity:     Worry: Not on file     Inability: Not on file   ? Transportation needs:     Medical: Not on file     Non-medical: Not on file   Tobacco Use   ? Smoking status: Former Smoker     Last attempt to quit: 1960     Years since quittin.9   ? Smokeless tobacco: Never Used   Substance and Sexual Activity   ? Alcohol use: Yes     Alcohol/week: 2.0 standard drinks     Types: 1 Glasses of wine, 1 Shots of liquor per week     Drinks per session: 1 or 2     Comment: daily every evening   ? Drug use: Never   ? Sexual activity: Yes     Partners: Female   Lifestyle   ? Physical activity:     Days per week: Not on file     Minutes per session: Not on file   ? Stress: Not on file   Relationships   ? Social connections:     Talks on phone: Not on file     Gets together: Not on file     Attends Shinto service: Not on file     Active member of club or organization: Not on file     Attends meetings of clubs or organizations: Not on file     Relationship status: Not on file   ? Intimate partner violence:     Fear of current or ex partner: Not on file     Emotionally abused: Not on file     Physically abused: Not on file     Forced sexual activity: Not on file   Other Topics Concern   ? Not on file   Social History Narrative   ? Not on file          Medications  Allergies   Current Outpatient Medications   Medication Sig Dispense Refill   ? aspirin 81 mg chewable tablet Chew 1 tablet (81 mg total) daily. 30 tablet 0   ? atorvastatin (LIPITOR) 80 MG tablet Take 1 tablet (80 mg total) by mouth at bedtime. 30 tablet 11   ? bumetanide (BUMEX) 2 MG tablet Take 1 tablet (2 mg total) by mouth 2 (two) times a day at 9am and 6pm. 90 tablet 0   ?  finasteride (PROSCAR) 5 mg tablet Take 5 mg by mouth daily.            ? FLUoxetine (PROZAC) 20 MG capsule Take 20 mg by mouth daily.            ? hydrALAZINE (APRESOLINE) 10 MG tablet Take 1 tablet (10 mg total) by mouth every 8 (eight) hours. 90 tablet 0   ? isosorbide dinitrate (ISORDIL) 10 MG tablet Take 1 tablet (10 mg total) by mouth 2 times a day at 9:00am and 5:00pm. 90 tablet 0   ? losartan (COZAAR) 50 MG tablet Take 1 tablet (50 mg total) by mouth at bedtime. 30 tablet 0   ? metoprolol succinate (TOPROL-XL) 50 MG 24 hr tablet Take 50 mg by mouth daily.            ? potassium chloride (KLOR-CON) 20 mEq packet Take 20 mEq by mouth 2 (two) times a day. 180 packet 1   ? tamsulosin (FLOMAX) 0.4 mg cap Take 0.4 mg by mouth Daily after lunch.            ? ticagrelor (BRILINTA) 90 mg Tab Take 1 tablet (90 mg total) by mouth 2 (two) times a day. For 12 months 60 tablet 11   ? warfarin (COUMADIN/JANTOVEN) 5 MG tablet Take 2.5 mg by mouth daily.              No current facility-administered medications for this visit.     Allergies   Allergen Reactions   ? Morphine Other (See Comments)     hallucinations   ? Vicodin [Hydrocodone-Acetaminophen] Other (See Comments)     Urinary retention         Lab Results    Chemistry/lipid CBC Cardiac Enzymes/BNP/TSH/INR   Lab Results   Component Value Date    CHOL 183 03/06/2017    HDL 51 03/06/2017    LDLCALC 119 03/06/2017    TRIG 65 03/06/2017    CREATININE 1.34 (H) 11/14/2019    BUN 28 11/14/2019    K 3.9 11/15/2019     11/14/2019     11/14/2019    CO2 24 11/14/2019    Lab Results   Component Value Date    WBC 6.9 11/09/2019    HGB 13.3 (L) 11/14/2019    HCT 39.3 (L) 11/09/2019    MCV 95 11/09/2019     11/09/2019    Lab Results   Component Value Date    TROPONINI 0.04 11/09/2019    BNP 1,642 (H) 11/08/2019    TSH 1.48 07/22/2019    INR 1.49 (H) 11/15/2019

## 2021-06-28 NOTE — PROGRESS NOTES
Progress Notes by Debbie Mehta PA-C at 12/23/2019 12:50 PM     Author: Debbie Mehta PA-C Service: -- Author Type: Physician Assistant    Filed: 12/23/2019  1:56 PM Encounter Date: 12/23/2019 Status: Signed    : Debbie Mehta PA-C (Physician Assistant)           Assessment/Recommendations   Diagnoses and all orders for this visit:    Severe mitral regurgitation - S/P mitral valve clip implantation on December 18.  His breathing has improved significantly since procedure.  He refused cardiac rehab upon discharge, but I will have them reach out to him again and he can do rehab over in [place].  Groin sites are healing nicely and he can resume all activities as tolerated with no restrictions at this time.  Repeat echocardiogram January 15 and follow-up with Dr. Jackson on January 24.    Chronic heart failure with reduced ejection fraction, NYHA class III (H) -compensated at this time.  He should continue taking Bumex 2 mg twice daily he is on appropriate ARB and beta-blocker therapy.      Persistent atrial fibrillation -heart rate on higher side today, although home heart rates have been in the 70s.  We will continue metoprolol succinate 50 mg daily at this time, but could consider increasing it to 75 mg daily if needing better heart rate control.  Continue warfarin and have INR checked later this week at PCP in [place]    Coronary artery disease -status post CABG in the past and status post drug-eluting stent to RCA on November 13.  Continue aspirin through December 25, then stop and continue Plavix for at least 1 year.  He is having no angina.  He is on beta blocker-and statin therapy and should continue both.    Benign essential hypertension -blood pressure is at goal today.  For now, I would like him to continue metoprolol succinate 50 mg daily, hydralazine 10 mg every 8 hours, isosorbide 10 mg twice daily and losartan 50 mg at bedtime if metoprolol needs to be  increased for better heart rate control, could discontinue isosorbide as trial    Thank you for the opportunity to participate in the care of Jass Mosqueda. It's been a pleasure working with him.  Please do not hesitate to call with any questions or concerns.       History of Present Illness/Subjective    I had the opportunity to see Jass Mosqueda at the [facility] for his 1 week follow-up visit after mitral clip implantation.  He brings with him 2 of his daughters.    Kingsley is a pleasant 84-year-old gentleman with history of severe MR, coronary artery disease, CABG, stent placement, ischemic cardiomyopathy and chronic systolic heart failure who underwent MitraClip at [facility] in 2017, complicated by air embolus, CVA, DVT and subsequent IVC filter placement.  He recovered at [facility], but recently had started complaining of increased dyspnea on exertion and fatigue.  Echocardiogram showed possible SLDA of the MitraClip and he came to see us for second opinion.    He underwent redo procedure on December 18 and we were able to place another MitraClip successfully.  Since implant he has had significant improvement in his breathing.  He seems fine with how he is feeling at this point and really does not want to do cardiac rehab, but both of his daughters are encouraging him to do so.  He wants to come off several of his medications and we attempted to discuss rationale for why he is on certain medications today during the visit.      Jass Mosqueda currently has some dizziness and lightheadedness.  He continues to have balance issues and fell once at home since discharge.  He denies exertional chest discomfort, palpitations, shortness of breath at rest, PND, orthopnea, pillow dissociate high how are you pre-syncope or syncope.  Jass Mosqueda also denies any recent weight loss, changes in appetite, nausea or vomiting.    ____________________________________________________________  Pre-discharge echo from December 19:    Left ventricle is moderately dilated with moderate eccentric hypertrophy.    Left ventricle ejection fraction is severely decreased. The estimated left ventricular ejection fraction is 35%.    The right ventricle is mildly dilated. The systolic function is moderately reduced. TAPSE is abnormal, which is consistent with abnormal right ventricular systolic function.    Severe biatrial enlargement is present.    There are two MitraClips visualized attached to the mitral valve. The medial clip appears to have independent motion suggestive of at least partial detachment, likely from posterior leaflet. The lateral clip appears well-attached with little motion. Mild stenosis is present with a mean gradient of 7 mmHg at a heart rate of 94 bpm. Moderate regurgitation remains.    Aortic valve is sclerotic without stenosis or regurgitation.    Mild pulmonary hypertension present. The estimated systolic pulmonary artery pressure is 43 mmhg.    When compared to the previous study dated 10/18/2019, there has been an interval placement of a second Mitral clip with reduction in mitral valve regurgitation.     Physical Examination Review of Systems   Vitals:    12/23/19 1309   BP: 124/90   Pulse: 96   Resp: 16     Body mass index is 28.43 kg/m .  Wt Readings from Last 3 Encounters:   12/23/19 187 lb (84.8 kg)   12/19/19 187 lb 3.2 oz (84.9 kg)   12/16/19 183 lb 4.8 oz (83.1 kg)       General Appearance:   Alert, cooperative and in no acute distress   ENT/Mouth: membranes moist, no oral lesions or bleeding gums.      EYES:  no scleral icterus, normal conjunctivae   Neck: Supple without lymphadenopathy.  Thyroid not visualized   Chest/Lungs:   lungs are clear to auscultation, no rales or wheezing   Cardiovascular:   irregular. Normal first and second heart sounds with no murmurs, rubs, or gallops; the carotid, radial and posterior  tibial pulses are intact, no edema bilaterally    Abdomen:  Soft and nontender. Bowel sounds are present in all quadrants   Extremities: no cyanosis or clubbing.  Puncture site is healing nicely with only minimal residual bruising   Skin: no xanthelasma, warm.    Neurologic: normal gait, normal  bilateral, no tremors   Psychiatric: Normal mood and affect    General: WNL  Eyes: WNL  Ears/Nose/Throat: WNL  Lungs: WNL  Heart: WNL  Stomach: WNL  Bladder: WNL  Muscle/Joints: WNL  Skin: WNL  Nervous System: Falls, Dizziness  Mental Health: WNL     Blood: WNL     Medical History  Surgical History Family History Social History   Past Medical History:   Diagnosis Date   ? Acute deep vein thrombosis (DVT) of femoral vein of right lower extremity (H)    ? Acute deep vein thrombosis (DVT) of popliteal vein of right lower extremity (H)    ? Acute myocardial infarction (H) 11/10/2007   ? Acute posthemorrhagic anemia 11/10/2007   ? JANELLE (acute kidney injury) (H) 12/9/2017   ? Atrial fibrillation (H)    ? Combined forms of age-related cataract 6/22/2017   ? Coronary artery disease    ? DNAR (do not attempt resuscitation)    ? 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 Hypertension    ? Mitral insufficiency    ? Myocardial infarction (H) 2017    Past Surgical History:   Procedure Laterality Date   ? CARDIAC CATHETERIZATION N/A 3/6/2017    Coronary Angiogram; Diaz Navarrete MD; [facility]   ? CARDIAC CATHETERIZATION  03/27/2017   ? CORONARY ANGIOPLASTY      mid LCX   ? CORONARY ARTERY BYPASS GRAFT  2012    LIMA to LAD, SVG to Intermediate   ? CORONARY STENT PLACEMENT     ? CV CORONARY ANGIOGRAM N/A 11/13/2019    Procedure: Coronary Angiogram;  Surgeon: Jose Jackson MD;  Location: [facility];  Service: Cardiology   ? CV LEFT HEART CATHETERIZATION WO LEFT VETRICULOGRAM Left 11/13/2019    Procedure: Left Heart Catheterization Without Left Ventriculogram;  Surgeon: Jose Jackson MD;  Location:   [facility];  Service: Cardiology   ? CV TRANSCATHETER MITRAL VALVE REPAIR N/A 2019    Procedure: CV Transcatheter Mitral Valve Repair with MitraClip;  Surgeon: Diaz Navarrete MD;  Location: [facility];  Service: Cardiology   ? john clip  2017    abbott, failed and cva post    No family history on file. Social History     Socioeconomic History   ? Marital status:      Spouse name: Not on file   ? Number of children: Not on file   ? Years of education: Not on file   ? Highest education level: Not on file   Occupational History     Employer: RETIRED   Social Needs   ? Financial resource strain: Not on file   ? Food insecurity:     Worry: Not on file     Inability: Not on file   ? Transportation needs:     Medical: Not on file     Non-medical: Not on file   Tobacco Use   ? Smoking status: Former Smoker     Last attempt to quit: 1960     Years since quittin.0   ? Smokeless tobacco: Never Used   Substance and Sexual Activity   ? Alcohol use: Yes     Alcohol/week: 1.0 standard drinks     Types: 1 Glasses of wine per week     Drinks per session: 1 or 2     Comment: daily every evening   ? Drug use: Never   ? Sexual activity: Yes     Partners: Female   Lifestyle   ? Physical activity:     Days per week: Not on file     Minutes per session: Not on file   ? Stress: Not on file   Relationships   ? Social connections:     Talks on phone: Not on file     Gets together: Not on file     Attends Restorationist service: Not on file     Active member of club or organization: Not on file     Attends meetings of clubs or organizations: Not on file     Relationship status: Not on file   ? Intimate partner violence:     Fear of current or ex partner: Not on file     Emotionally abused: Not on file     Physically abused: Not on file     Forced sexual activity: Not on file   Other Topics Concern   ? Not on file   Social History Narrative   ? Not on file          Medications  Allergies   Current  Outpatient Medications   Medication Sig Dispense Refill   ? atorvastatin (LIPITOR) 80 MG tablet Take 1 tablet (80 mg total) by mouth at bedtime. 30 tablet 11   ? bumetanide (BUMEX) 2 MG tablet Take 1 tablet (2 mg total) by mouth 2 (two) times a day at 9am and 6pm. 90 tablet 0   ? clopidogrel (PLAVIX) 75 mg tablet Take 1 tablet (75 mg total) by mouth daily. (Patient taking differently: Take 75 mg by mouth daily. PATIENT HAS NOT STARTED YET.  WAS STILL TAKING TICAGRELOR PTA) 90 tablet 3   ? docusate sodium (COLACE) 100 MG capsule Take 100 mg by mouth at bedtime.     ? finasteride (PROSCAR) 5 mg tablet Take 5 mg by mouth daily.            ? FLUoxetine (PROZAC) 20 MG capsule Take 20 mg by mouth daily.            ? hydrALAZINE (APRESOLINE) 10 MG tablet Take 1 tablet (10 mg total) by mouth every 8 (eight) hours. 90 tablet 0   ? isosorbide dinitrate (ISORDIL) 10 MG tablet Take 1 tablet (10 mg total) by mouth 2 times a day at 9:00am and 5:00pm. 90 tablet 0   ? losartan (COZAAR) 50 MG tablet Take 1 tablet (50 mg total) by mouth at bedtime. 30 tablet 0   ? metoprolol succinate (TOPROL-XL) 50 MG 24 hr tablet Take 50 mg by mouth daily.            ? potassium chloride (KLOR-CON) 20 mEq packet Take 20 mEq by mouth 2 (two) times a day. 180 packet 1   ? tamsulosin (FLOMAX) 0.4 mg cap Take 0.4 mg by mouth Daily after lunch.            ? warfarin (COUMADIN/JANTOVEN) 5 MG tablet Take 2.5 mg by mouth daily. 2.5 mg mon, wed, then 1.25 mg rest of week       No current facility-administered medications for this visit.     Allergies   Allergen Reactions   ? Morphine Other (See Comments)     hallucinations   ? Vicodin [Hydrocodone-Acetaminophen] Other (See Comments)     Urinary retention         Lab Results    Chemistry/lipid CBC Cardiac Enzymes/BNP/TSH/INR   Lab Results   Component Value Date    CHOL 183 03/06/2017    HDL 51 03/06/2017    LDLCALC 119 03/06/2017    TRIG 65 03/06/2017    CREATININE 1.58 (H) 12/19/2019    BUN 35 (H)  12/19/2019    K 4.6 12/19/2019     12/19/2019     12/19/2019    CO2 23 12/19/2019    Lab Results   Component Value Date    WBC 8.8 12/19/2019    HGB 11.6 (L) 12/19/2019    HCT 35.7 (L) 12/19/2019    MCV 98 12/19/2019     12/19/2019    Lab Results   Component Value Date    TROPONINI 0.04 11/09/2019     (H) 12/16/2019    TSH 1.48 07/22/2019    INR 1.42 (H) 12/19/2019        This note has been dictated using voice recognition software. Any grammatical or context distortions are unintentional and inherent to the software.

## 2021-07-03 NOTE — ADDENDUM NOTE
Addendum Note by Ruddy Finnegan RN at 3/17/2017  5:22 PM     Author: Ruddy Finnegan RN Service: -- Author Type: Registered Nurse    Filed: 3/17/2017  5:22 PM Encounter Date: 3/10/2017 Status: Signed    : Ruddy Finnegan RN (Registered Nurse)    Addended by: RUDDY FINNEGAN on: 3/17/2017 05:22 PM        Modules accepted: Orders

## 2021-07-14 PROBLEM — Z51.5 ENCOUNTER FOR PALLIATIVE CARE: Status: RESOLVED | Noted: 2019-07-22 | Resolved: 2019-10-18

## 2021-07-14 PROBLEM — I50.20 HEART FAILURE WITH REDUCED EJECTION FRACTION (H): Chronic | Status: RESOLVED | Noted: 2019-09-06 | Resolved: 2019-11-07

## 2021-07-14 PROBLEM — N17.9 AKI (ACUTE KIDNEY INJURY) (H): Status: RESOLVED | Noted: 2017-12-09 | Resolved: 2019-10-18

## 2021-07-14 PROBLEM — I63.40: Status: RESOLVED | Noted: 2017-12-21 | Resolved: 2019-10-18

## 2021-10-10 ENCOUNTER — HEALTH MAINTENANCE LETTER (OUTPATIENT)
Age: 86
End: 2021-10-10

## 2022-02-11 VITALS
HEART RATE: 72 BPM | TEMPERATURE: 98.1 F | OXYGEN SATURATION: 96 % | TEMPERATURE: 97.1 F | HEART RATE: 72 BPM | TEMPERATURE: 98.2 F | HEIGHT: 68 IN | DIASTOLIC BLOOD PRESSURE: 74 MMHG | WEIGHT: 197 LBS | HEART RATE: 64 BPM | HEIGHT: 68 IN | SYSTOLIC BLOOD PRESSURE: 132 MMHG | SYSTOLIC BLOOD PRESSURE: 146 MMHG | DIASTOLIC BLOOD PRESSURE: 76 MMHG | HEIGHT: 68 IN | OXYGEN SATURATION: 97 % | WEIGHT: 196 LBS | RESPIRATION RATE: 22 BRPM | SYSTOLIC BLOOD PRESSURE: 162 MMHG | OXYGEN SATURATION: 98 % | RESPIRATION RATE: 20 BRPM | RESPIRATION RATE: 16 BRPM | WEIGHT: 198 LBS | DIASTOLIC BLOOD PRESSURE: 86 MMHG | BODY MASS INDEX: 29.7 KG/M2 | BODY MASS INDEX: 30.01 KG/M2 | BODY MASS INDEX: 29.86 KG/M2

## 2022-02-11 VITALS
DIASTOLIC BLOOD PRESSURE: 86 MMHG | TEMPERATURE: 97.5 F | WEIGHT: 192 LBS | SYSTOLIC BLOOD PRESSURE: 154 MMHG | HEART RATE: 60 BPM | RESPIRATION RATE: 16 BRPM | BODY MASS INDEX: 29.1 KG/M2 | OXYGEN SATURATION: 99 % | HEIGHT: 68 IN

## 2022-02-12 VITALS
DIASTOLIC BLOOD PRESSURE: 80 MMHG | TEMPERATURE: 97.1 F | TEMPERATURE: 98.8 F | OXYGEN SATURATION: 99 % | SYSTOLIC BLOOD PRESSURE: 122 MMHG | HEIGHT: 68 IN | SYSTOLIC BLOOD PRESSURE: 146 MMHG | OXYGEN SATURATION: 98 % | TEMPERATURE: 96.8 F | HEIGHT: 68 IN | HEART RATE: 98 BPM | BODY MASS INDEX: 28.79 KG/M2 | DIASTOLIC BLOOD PRESSURE: 80 MMHG | BODY MASS INDEX: 29.04 KG/M2 | BODY MASS INDEX: 29.55 KG/M2 | RESPIRATION RATE: 20 BRPM | WEIGHT: 195 LBS | SYSTOLIC BLOOD PRESSURE: 136 MMHG | OXYGEN SATURATION: 90 % | HEART RATE: 102 BPM | HEART RATE: 76 BPM | WEIGHT: 190 LBS | RESPIRATION RATE: 16 BRPM | HEART RATE: 82 BPM | DIASTOLIC BLOOD PRESSURE: 86 MMHG | HEIGHT: 68 IN | DIASTOLIC BLOOD PRESSURE: 94 MMHG | SYSTOLIC BLOOD PRESSURE: 122 MMHG

## 2022-02-12 VITALS
OXYGEN SATURATION: 95 % | RESPIRATION RATE: 20 BRPM | BODY MASS INDEX: 28.04 KG/M2 | HEART RATE: 80 BPM | SYSTOLIC BLOOD PRESSURE: 138 MMHG | WEIGHT: 185 LBS | DIASTOLIC BLOOD PRESSURE: 82 MMHG | HEIGHT: 68 IN | TEMPERATURE: 96.5 F

## 2022-02-12 VITALS
TEMPERATURE: 97.8 F | SYSTOLIC BLOOD PRESSURE: 170 MMHG | BODY MASS INDEX: 28.67 KG/M2 | WEIGHT: 189.2 LBS | HEIGHT: 68 IN | HEART RATE: 65 BPM | OXYGEN SATURATION: 98 % | DIASTOLIC BLOOD PRESSURE: 96 MMHG

## 2022-02-12 VITALS
WEIGHT: 190 LBS | TEMPERATURE: 97.8 F | HEIGHT: 68 IN | SYSTOLIC BLOOD PRESSURE: 168 MMHG | BODY MASS INDEX: 28.79 KG/M2 | TEMPERATURE: 97.8 F | HEIGHT: 68 IN | HEART RATE: 72 BPM | BODY MASS INDEX: 28.79 KG/M2 | RESPIRATION RATE: 20 BRPM | HEART RATE: 64 BPM | RESPIRATION RATE: 16 BRPM | OXYGEN SATURATION: 98 % | DIASTOLIC BLOOD PRESSURE: 88 MMHG | DIASTOLIC BLOOD PRESSURE: 84 MMHG | SYSTOLIC BLOOD PRESSURE: 176 MMHG | WEIGHT: 190 LBS

## 2022-02-12 VITALS
WEIGHT: 192.4 LBS | SYSTOLIC BLOOD PRESSURE: 150 MMHG | BODY MASS INDEX: 29.16 KG/M2 | HEIGHT: 68 IN | DIASTOLIC BLOOD PRESSURE: 86 MMHG | HEART RATE: 68 BPM | TEMPERATURE: 97.5 F

## 2022-02-12 VITALS
HEART RATE: 64 BPM | RESPIRATION RATE: 16 BRPM | BODY MASS INDEX: 28.49 KG/M2 | OXYGEN SATURATION: 97 % | HEART RATE: 60 BPM | OXYGEN SATURATION: 97 % | SYSTOLIC BLOOD PRESSURE: 160 MMHG | DIASTOLIC BLOOD PRESSURE: 100 MMHG | TEMPERATURE: 96.7 F | TEMPERATURE: 97.3 F | HEART RATE: 64 BPM | HEIGHT: 68 IN | TEMPERATURE: 97.6 F | RESPIRATION RATE: 16 BRPM | RESPIRATION RATE: 24 BRPM | WEIGHT: 188 LBS | RESPIRATION RATE: 16 BRPM | WEIGHT: 187 LBS | DIASTOLIC BLOOD PRESSURE: 76 MMHG | SYSTOLIC BLOOD PRESSURE: 134 MMHG | WEIGHT: 199 LBS | WEIGHT: 188 LBS | HEART RATE: 64 BPM | BODY MASS INDEX: 28.49 KG/M2 | DIASTOLIC BLOOD PRESSURE: 108 MMHG | BODY MASS INDEX: 28.19 KG/M2 | DIASTOLIC BLOOD PRESSURE: 98 MMHG | DIASTOLIC BLOOD PRESSURE: 90 MMHG | RESPIRATION RATE: 20 BRPM | OXYGEN SATURATION: 99 % | SYSTOLIC BLOOD PRESSURE: 172 MMHG | OXYGEN SATURATION: 99 % | HEIGHT: 68 IN | SYSTOLIC BLOOD PRESSURE: 168 MMHG | HEART RATE: 72 BPM | RESPIRATION RATE: 16 BRPM | SYSTOLIC BLOOD PRESSURE: 170 MMHG | WEIGHT: 196 LBS | OXYGEN SATURATION: 97 % | TEMPERATURE: 97.5 F | HEIGHT: 68 IN | DIASTOLIC BLOOD PRESSURE: 108 MMHG | BODY MASS INDEX: 30.16 KG/M2 | TEMPERATURE: 98.8 F | TEMPERATURE: 97.9 F | BODY MASS INDEX: 28.34 KG/M2 | OXYGEN SATURATION: 97 % | HEART RATE: 60 BPM | SYSTOLIC BLOOD PRESSURE: 150 MMHG | HEIGHT: 68 IN | BODY MASS INDEX: 29.7 KG/M2 | WEIGHT: 186 LBS | HEIGHT: 68 IN | HEIGHT: 68 IN

## 2022-02-12 VITALS
SYSTOLIC BLOOD PRESSURE: 134 MMHG | BODY MASS INDEX: 30.01 KG/M2 | TEMPERATURE: 98.8 F | RESPIRATION RATE: 20 BRPM | HEIGHT: 68 IN | TEMPERATURE: 98 F | OXYGEN SATURATION: 96 % | TEMPERATURE: 98.2 F | HEIGHT: 68 IN | OXYGEN SATURATION: 97 % | HEART RATE: 60 BPM | SYSTOLIC BLOOD PRESSURE: 146 MMHG | WEIGHT: 195 LBS | RESPIRATION RATE: 16 BRPM | HEART RATE: 64 BPM | BODY MASS INDEX: 28.59 KG/M2 | OXYGEN SATURATION: 98 % | WEIGHT: 198 LBS | HEIGHT: 68 IN | DIASTOLIC BLOOD PRESSURE: 68 MMHG | SYSTOLIC BLOOD PRESSURE: 126 MMHG | HEART RATE: 72 BPM | DIASTOLIC BLOOD PRESSURE: 76 MMHG | RESPIRATION RATE: 16 BRPM | DIASTOLIC BLOOD PRESSURE: 80 MMHG | BODY MASS INDEX: 29.55 KG/M2

## 2022-02-12 VITALS
OXYGEN SATURATION: 98 % | HEART RATE: 72 BPM | RESPIRATION RATE: 22 BRPM | OXYGEN SATURATION: 92 % | HEIGHT: 68 IN | DIASTOLIC BLOOD PRESSURE: 70 MMHG | HEART RATE: 72 BPM | RESPIRATION RATE: 20 BRPM | BODY MASS INDEX: 29.8 KG/M2 | BODY MASS INDEX: 29.8 KG/M2 | SYSTOLIC BLOOD PRESSURE: 152 MMHG | TEMPERATURE: 97.4 F | TEMPERATURE: 97.3 F | HEIGHT: 68 IN | SYSTOLIC BLOOD PRESSURE: 162 MMHG | DIASTOLIC BLOOD PRESSURE: 70 MMHG

## 2022-02-12 VITALS
RESPIRATION RATE: 20 BRPM | WEIGHT: 191 LBS | HEART RATE: 60 BPM | HEIGHT: 68 IN | TEMPERATURE: 97.7 F | SYSTOLIC BLOOD PRESSURE: 176 MMHG | BODY MASS INDEX: 28.95 KG/M2 | DIASTOLIC BLOOD PRESSURE: 90 MMHG | OXYGEN SATURATION: 98 %

## 2022-02-12 VITALS
BODY MASS INDEX: 29.55 KG/M2 | HEART RATE: 88 BPM | RESPIRATION RATE: 20 BRPM | WEIGHT: 195 LBS | HEIGHT: 68 IN | TEMPERATURE: 97.2 F | OXYGEN SATURATION: 99 % | SYSTOLIC BLOOD PRESSURE: 132 MMHG | DIASTOLIC BLOOD PRESSURE: 88 MMHG

## 2022-02-12 VITALS
SYSTOLIC BLOOD PRESSURE: 128 MMHG | OXYGEN SATURATION: 96 % | HEART RATE: 88 BPM | RESPIRATION RATE: 20 BRPM | DIASTOLIC BLOOD PRESSURE: 72 MMHG | TEMPERATURE: 96.4 F | WEIGHT: 187 LBS | HEIGHT: 68 IN | BODY MASS INDEX: 28.34 KG/M2

## 2022-02-12 VITALS
WEIGHT: 193 LBS | DIASTOLIC BLOOD PRESSURE: 86 MMHG | TEMPERATURE: 97.5 F | OXYGEN SATURATION: 97 % | HEART RATE: 72 BPM | HEIGHT: 68 IN | RESPIRATION RATE: 16 BRPM | BODY MASS INDEX: 29.25 KG/M2 | SYSTOLIC BLOOD PRESSURE: 180 MMHG

## 2022-02-15 NOTE — TELEPHONE ENCOUNTER
Entered by Natalie Simon CMA on December 10, 2019 8:15:05 PM CST  ---------------------  From: Natalie Simon CMA   To: [facility]    Sent: 12/10/2019 8:15:05 PM CST  Subject: Medication Management     ** Not Approved: duplicate request **  warfarin (WARFARIN     TAB 2.5MG TABLET)  TAKE 1 TABLET BY MOUTH EVERY OTHER DAY ALTERNATE WITH TAKE 0.5 TABLET EVERY OTHER DAY  Qty:  30 unknown unit        Days Supply:  0        Refills:  0          Substitutions Allowed     Route To Pharmacy - [facility]   Signed by Natalie Simon CMA            ** Submitted: **  Order:warfarin (warfarin 2.5 mg oral tablet)  See Instructions  TAKE 1 TABLET BY MOUTH EVERY OTHER DAY ALTERNATE WITH TAKE 0.5 TABLET EVERY OTHER DAY  Qty:  135 tab(s)        Refills:  1          Substitutions Allowed     Route To Pharmacy - [facility]    Signed by Natalie Simon CMA  12/10/2019 8:14:00 PM    ** Submitted: **  Complete:warfarin (warfarin 2.5 mg oral tablet)   Signed by Natalie Simon CMA  12/10/2019 8:14:00 PM    ** Not Approved:  **  warfarin (WARFARIN     TAB 2.5MG TABLET)  TAKE 1 TABLET BY MOUTH EVERY OTHER DAY ALTERNATE WITH TAKE 0.5 TABLET EVERY OTHER DAY  Qty:  30 unknown unit        Days Supply:  0        Refills:  0          Substitutions Allowed     Route To Pharmacy - [facility]   Signed by Natalie Simon CMA            ** Patient matched by Natalie Simon CMA on 12/10/2019 8:11:23 PM CST **      ------------------------------------------  From: [facility]  To: Berhane Krause PA-C  Sent: December 9, 2019 3:46:33 PM CST  Subject: Medication Management  Due: December 10, 2019 3:46:33 PM CST    ** On Hold Pending Signature **  Drug: warfarin (warfarin 2.5 mg oral tablet)  TAKE 1 TABLET BY MOUTH EVERY OTHER DAY ALTERNATE WITH TAKE 0.5 TABLET EVERY OTHER DAY  Quantity: 30 unknown unit  Days Supply: 0  Refills: 0  Substitutions Allowed  Notes from Pharmacy:     Dispensed Drug: warfarin (warfarin 2.5 mg oral tablet)  TAKE 1 TABLET BY MOUTH EVERY  OTHER DAY ALTERNATE WITH TAKE 0.5 TABLET EVERY OTHER DAY  Quantity: 30 unknown unit  Days Supply: 0  Refills: 0  Substitutions Allowed  Notes from Pharmacy:     ** On Hold Pending Signature **  Drug: warfarin (warfarin 2.5 mg oral tablet)  TAKE 1 TABLET BY MOUTH EVERY OTHER DAY ALTERNATE WITH TAKE 0.5 TABLET EVERY OTHER DAY  Quantity: 30 unknown unit  Days Supply: 0  Refills: 0  Substitutions Allowed  Notes from Pharmacy:     Dispensed Drug: warfarin (warfarin 2.5 mg oral tablet)  TAKE 1 TABLET BY MOUTH EVERY OTHER DAY ALTERNATE WITH TAKE 0.5 TABLET EVERY OTHER DAY  Quantity: 30 unknown unit  Days Supply: 0  Refills: 0  Substitutions Allowed  Notes from Pharmacy:   ------------------------------------------

## 2022-02-15 NOTE — TELEPHONE ENCOUNTER
---------------------  From: Chioma ORTEGA, Yamila VIEIRA   To: [facility] ([id number] [place] [facility] [place]     Sent: 2/14/2020 11:24:58 AM CST  Subject: INR past due/FYI     Patient was discharged from hospital on 2/7/20 and was supposed to have INR done next day.    Pt has no appointments scheduled.    Called pt home number. Spoke to one of his daughters. Patient is living at [facility] in [place] at this time and they are addressing meds/INR's/treatments. Patient will be there for undetermined amount of time.    Will deactivate patient in [employer] system at this time.---------------------  From: Ashlee Bejarano ([facility] ([id number] [place] [facility] [place]   To: Berhane Krause PA-C;     Sent: 2/14/2020 1:45:39 PM CST  Subject: FW: INR past due/FYI     FYI!

## 2022-02-15 NOTE — NURSING NOTE
"[facility].  I printed all pt scripts and faxed them with [id number]) and [facility]ed them to [facility] Attn: Dr Rut Wiley  [phone] per pt request.  Pt was called and informed that this was done and that it can take 2-3 weeks for the [facility] to \"process and approve\" this request.  Vishnu Funes CMA  "

## 2022-02-15 NOTE — RESULTS
Anticoagulation Therapy Entered On:  9/16/2019 12:04 PM CDT    Performed On:  9/16/2019 11:56 AM CDT by Karena Freeman RN               Warfarin Management   Week 1 Monday Dose :   2.5    Week 1 Tuesday Dose :   2.5    Week 1 Wednesday Dose :   2.5    Week 1 Thursday Dose :   2.5    Week 1 Friday Dose :   2.5    Planned Duration :   Indefinite   Indication :   Stroke   Warfarin Management Comments :   [facility]  [address], [address]  [phone]  Capillary Specimen     International Normalization Ratio :   3.7    INR Range :   2.0 - 3.0   INR Therapeutic Range :   No   Warfarin Abnormal Findings :   Patient states usual to have one coctail in evening. Provider advised. Has dental extraction on 9/23/19.   Karena Freeman RN - 9/16/2019 12:00 PM CDT   Warfarin Management and Results Grid   Signs of Thrombolic :   No   Signs of Warfarin Intolerance :   No   Changes in Diet or Alcohol Intake :   No   Changes in Medication or Antibiotics :   No   Unusual Bleeding or Bruising :   No   Rectal Bleeding or Black Stools :   No   Karena Freeman RN - 9/16/2019 12:00 PM CDT   Anticoagulation Recheck :   9/20/19   Warfarin Special Instructions :   INR = 3.7 per POC Patient is to take 2.5mg warfarin daily and recheck INR on 9/20/19 per DG. Daughter Tammy called with directions.   Karena Freeman RN - 9/16/2019 12:00 PM CDT

## 2022-02-15 NOTE — CARE COORDINATION
Patient:   PUMA BAEZ            MRN: [id number]            FIN: [id number]               Age:   83 years     Sex:  Male     :  1935   Associated Diagnoses:   None   Author:   Sherrie Santoro CMA      Care Coordinator ER Discharge Note      Sources of Information:  [X ] Patient, family member, or caregiver (Please list):  Spoke with Puma, he is doing ok. Still in a lot of pain.  Hasn't moved much.  He isn't sure if he should stay in one place or move around.  ER  said one thing, nurse neighbor says another.  He doesn't like to sit still.  But it is very painful to move. He will come in for f/u as he feels he will need a refill of his medications as well.  [ ] Hospital discharge summary  [ ] Hospital fax  [ ] List of recent hospitalizations or ED visits  [ ] Other:     Discharged From: [facility] ER  Discharge Date:19    Diagnosis/Problem:  Closed fracture of multiple puck rami, left    Medication Changes: [ ] Yes [ X] No   Medication List Updated: [ X] Yes [ ] No  oxycodone-acetaminophen 5-325mg #15: one tab q 6 hours prn pain.  Max acetaminophen dose: 4000mg in 24 hours.                                                                       Hospital Med List at Discharge Reconciled with Current Med List   Medications          *denotes recorded medication          FLUoxetine 20 mg oral capsule: 40 mg, 2 cap(s), po, daily, 90 cap(s), 3 Refill(s).          *acetaminophen 325 mg oral capsule: 325 mg, 1 cap(s), po, tid, 0 Refill(s).          *acetaminophen-oxycodone 325 mg-5 mg oral tablet: 1 tab(s), Oral, q6 hrs, 15, 0 Refill(s).          *amLODIPine 5 mg oral tablet: 5 mg, 1 tab(s), PO, Daily, 30 tab(s), 0 Refill(s).          aspirin 81 mg oral tablet: 162 mg, 2 tab(s), po, daily, 100 tab(s), 0 Refill(s).          *atorvastatin 40 mg oral tablet: 40 mg, 1 tab(s), Oral, daily, Per Cardio note 2018, 0 Refill(s).          ferrous gluconate 324 mg (37.5 mg elemental iron) oral tablet: 324 mg, 1 tab(s),  po, daily, 90 tab(s), 3 Refill(s).          finasteride 5 mg oral tablet: 5 mg, 1 tab(s), PO, Daily, 90 tab(s), 3 Refill(s).          furosemide 40 mg oral tablet: See Instructions, 1.5 tabs(60 mg) PO daily., 135 tab(s), 1 Refill(s).          losartan 25 mg oral tablet: 25 mg, 1 tab(s), PO, Daily, 90 tab(s), 3 Refill(s).          metoprolol succinate 50 mg oral tablet, extended release: 50 mg, 1 tab(s), PO, Daily, 90 tab(s), 3 Refill(s).          tamsulosin 0.4 mg oral capsule: 0.4 mg, 1 cap(s), po, daily, 90 cap(s), 3 Refill(s).    Needs Referral or Lab: [ ] Yes [X ] No    Needs Follow-up Appointment:  as needed  [ ] Within 7 days of discharge (highly complex visit)  [ ] Within 14 days of discharge (moderately complex visit)    Appointment Made With: will call back to schedule  Date:    Additional Information Needed and Requested:  [ ] Yes:  [ X] No

## 2022-02-15 NOTE — NURSING NOTE
Comprehensive Intake Entered On:  9/11/2019 9:14 AM CDT    Performed On:  9/11/2019 9:05 AM CDT by Vishnu Funes CMA               Summary   Chief Complaint :   Pt here for Post Hospital visit for CHF exacerbation and FU on fall/head injury from 9-9-19.   Weight Measured :   195 lb(Converted to: 195 lb 0 oz, 88.45 kg)    Height Measured :   68 in(Converted to: 5 ft 8 in, 172.72 cm)    Body Mass Index :   29.65 kg/m2 (HI)    Body Surface Area :   2.06 m2   Systolic Blood Pressure :   136 mmHg (HI)    Diastolic Blood Pressure :   80 mmHg   Mean Arterial Pressure :   99 mmHg   Peripheral Pulse Rate :   102 bpm (HI)    Vital Signs Comments :   Pulse Irregular   BP Site :   Right arm   Pulse Site :   Radial artery   Temperature Tympanic :   98.8 DegF(Converted to: 37.1 DegC)    Respiratory Rate :   20 br/min   Oxygen Saturation :   98 %   Vishnu Funes CMA - 9/11/2019 9:05 AM CDT   Health Status   Allergies Verified? :   Yes   Medication History Verified? :   Yes   Medical History Verified? :   Yes   Pre-Visit Planning Status :   Completed   Tobacco Use? :   Never smoker   Vishnu Funes CMA - 9/11/2019 9:05 AM CDT   Meds / Allergies   (As Of: 9/11/2019 9:14:55 AM CDT)   Allergies (Active)   Narcotics  Estimated Onset Date:   Unspecified ; Reactions:   severe constipation ; Created By:   Schoen RN, Alissa; Reaction Status:   Active ; Category:   Drug ; Substance:   Narcotics ; Type:   Allergy ; Updated By:   Schoen RN, Alissa; Reviewed Date:   9/11/2019 9:11 AM CDT      Vicodin  Estimated Onset Date:   <not entered> 2011 ; Reactions:   Urinary retention ; Created By:   Berhane Krause PA-C; Reaction Status:   Active ; Category:   Drug ; Substance:   Vicodin ; Type:   Allergy ; Updated By:   Berhane Krause PA-C; Reviewed Date:   9/11/2019 9:11 AM CDT        Medication List   (As Of: 9/11/2019 9:14:55 AM CDT)   Prescription/Discharge Order    tamsulosin  :   tamsulosin ; Status:   Prescribed ; Ordered As Mnemonic:    tamsulosin 0.4 mg oral capsule ; Simple Display Line:   0.4 mg, 1 cap(s), po, daily, 90 cap(s), 3 Refill(s) ; Ordering Provider:   Berhane Krause PA-C; Catalog Code:   tamsulosin ; Order Dt/Tm:   1/15/2019 10:52:54 AM          finasteride  :   finasteride ; Status:   Prescribed ; Ordered As Mnemonic:   finasteride 5 mg oral tablet ; Simple Display Line:   5 mg, 1 tab(s), PO, Daily, 90 tab(s), 3 Refill(s) ; Ordering Provider:   Berhane Krause PA-C; Catalog Code:   finasteride ; Order Dt/Tm:   1/15/2019 10:49:10 AM          FLUoxetine  :   FLUoxetine ; Status:   Prescribed ; Ordered As Mnemonic:   FLUoxetine 20 mg oral capsule ; Simple Display Line:   40 mg, 2 cap(s), po, daily, 90 cap(s), 3 Refill(s) ; Ordering Provider:   Dr Danika Esposito; Catalog Code:   FLUoxetine ; Order Dt/Tm:   1/15/2019 10:48:58 AM          metoprolol  :   metoprolol ; Status:   Prescribed ; Ordered As Mnemonic:   metoprolol succinate 50 mg oral tablet, extended release ; Simple Display Line:   50 mg, 1 tab(s), PO, Daily, 90 tab(s), 3 Refill(s) ; Ordering Provider:   Berhane Krause PA-C; Catalog Code:   metoprolol ; Order Dt/Tm:   1/15/2019 10:48:42 AM          ferrous gluconate  :   ferrous gluconate ; Status:   Prescribed ; Ordered As Mnemonic:   ferrous gluconate 324 mg (37.5 mg elemental iron) oral tablet ; Simple Display Line:   324 mg, 1 tab(s), po, daily, 90 tab(s), 3 Refill(s) ; Ordering Provider:   Berhane Krause PA-C; Catalog Code:   ferrous gluconate ; Order Dt/Tm:   3/19/2018 8:09:39 AM          aspirin  :   aspirin ; Status:   Prescribed ; Ordered As Mnemonic:   aspirin 81 mg oral tablet ; Simple Display Line:   162 mg, 2 tab(s), po, daily, 100 tab(s), 0 Refill(s) ; Ordering Provider:   Berhane Krause PA-C; Catalog Code:   aspirin ; Order Dt/Tm:   9/2/2011 3:37:00 PM            Home Meds    enoxaparin  :   enoxaparin ; Status:   Processing ; Ordered As Mnemonic:   enoxaparin 80 mg/0.8 mL injectable solution ; Action  Display:   Complete ; Catalog Code:   enoxaparin ; Order Dt/Tm:   9/11/2019 9:10:56 AM          doxylamine  :   doxylamine ; Status:   Documented ; Ordered As Mnemonic:   doxylamine ; Simple Display Line:   25 mg, Oral, daily, 0 Refill(s) ; Catalog Code:   doxylamine ; Order Dt/Tm:   9/11/2019 9:08:36 AM          warfarin  :   warfarin ; Status:   Documented ; Ordered As Mnemonic:   warfarin 5 mg oral tablet ; Simple Display Line:   5 mg, 1 tab(s), Oral, daily, 30 tab(s), 0 Refill(s) ; Catalog Code:   warfarin ; Order Dt/Tm:   9/11/2019 9:08:22 AM          potassium chloride  :   potassium chloride ; Status:   Documented ; Ordered As Mnemonic:   potassium chloride 20 mEq oral tablet, extended release ; Simple Display Line:   20 mEq, 1 tab(s), Oral, bid, 60 tab(s), 0 Refill(s) ; Catalog Code:   potassium chloride ; Order Dt/Tm:   8/16/2019 9:27:40 AM          losartan  :   losartan ; Status:   Documented ; Ordered As Mnemonic:   losartan 50 mg oral tablet ; Simple Display Line:   50 mg, 1 tab(s), Oral, daily, 30 tab(s), 0 Refill(s) ; Catalog Code:   losartan ; Order Dt/Tm:   8/16/2019 9:20:37 AM          furosemide  :   furosemide ; Status:   Documented ; Ordered As Mnemonic:   furosemide 40 mg oral tablet ; Simple Display Line:   See Instructions, Take 80mg in the AM and 20mg in the afternoon  Per cardio from 7/3/19, 0 Refill(s) ; Catalog Code:   furosemide ; Order Dt/Tm:   7/8/2019 8:34:49 AM          atorvastatin  :   atorvastatin ; Status:   Documented ; Ordered As Mnemonic:   atorvastatin 40 mg oral tablet ; Simple Display Line:   40 mg, 1 tab(s), Oral, daily, Per Cardio note 8/8/2018, 0 Refill(s) ; Catalog Code:   atorvastatin ; Order Dt/Tm:   8/24/2018 2:27:27 PM          amLODIPine  :   amLODIPine ; Status:   Documented ; Ordered As Mnemonic:   amLODIPine 5 mg oral tablet ; Simple Display Line:   5 mg, 1 tab(s), PO, Daily, 30 tab(s), 0 Refill(s) ; Catalog Code:   amLODIPine ; Order Dt/Tm:   5/18/2018 9:02:55  AM          acetaminophen  :   acetaminophen ; Status:   Documented ; Ordered As Mnemonic:   acetaminophen 325 mg oral capsule ; Simple Display Line:   325 mg, 1 cap(s), po, tid, 0 Refill(s) ; Catalog Code:   acetaminophen ; Order Dt/Tm:   1/18/2018 10:13:21 AM            Social History   Social History   (As Of: 9/11/2019 9:14:55 AM CDT)   Alcohol:  Current      Current   Comments:  8/18/2010 2:59 PM - Olivia Coates CMA: rare   (Last Updated: 8/18/2010 2:59:42 PM CDT by Olivia Coates CMA)          Tobacco:  Denies Tobacco Use      (Last Updated: 8/18/2010 2:59:53 PM CDT by Olivia Coates CMA )         Substance Abuse:  Denies Substance Abuse      (Last Updated: 3/14/2012 3:14:16 PM CDT by Adrian Argueta MD )         Home/Environment:        Marital status: .   (Last Updated: 9/5/2017 2:18:47 PM CDT by Claudia Larson MA)

## 2022-02-15 NOTE — TELEPHONE ENCOUNTER
Entered by Jennifer Garcia CMA on March 09, 2020 1:40:43 PM CDT  Saw [facility] 3/4/20 for CDM. No RTC. 6mth supply given      ** Patient matched by Jennifer Garcia CMA on 3/9/2020 1:38:49 PM CDT **      ------------------------------------------  From: [facility]  To: Berhane Krause PA-C  Sent: March 9, 2020 12:43:52 PM CDT  Subject: Medication Management  Due: March 10, 2020 12:43:52 PM CDT    ** On Hold Pending Signature **  Drug: finasteride (finasteride 5 mg oral tablet)  TAKE 1 TABLET BY MOUTH ONCE DAILY  Quantity: 90 unknown unit  Days Supply: 0  Refills: 0  Substitutions Allowed  Notes from Pharmacy:     Dispensed Drug: finasteride (finasteride 5 mg oral tablet)  TAKE 1 TABLET BY MOUTH ONCE DAILY  Quantity: 90 unknown unit  Days Supply: 0  Refills: 0  Substitutions Allowed  Notes from Pharmacy:   ---------------------------------------------------------------  From: Jennifer Garcia CMA   To: [facility]    Sent: 3/9/2020 1:41:12 PM CDT  Subject: Medication Management     ** Submitted: **  Order:finasteride (finasteride 5 mg oral tablet)  See Instructions  TAKE 1 TABLET BY MOUTH ONCE DAILY  Qty:  90 tab(s)        Refills:  1          Substitutions Allowed     Route To Pharmacy - [facility]    Signed by Jennifer Garcia CMA  3/9/2020 1:40:00 PM    ** Submitted: **  Complete:finasteride (finasteride 5 mg oral tablet)   Signed by Jennifer Garcia CMA  3/9/2020 1:41:00 PM    ** Not Approved:  **  finasteride (FINASTERIDE  5 MG  TAB TABLET)  TAKE 1 TABLET BY MOUTH ONCE DAILY  Qty:  90 unknown unit        Days Supply:  0        Refills:  0          Substitutions Allowed     Route To Pharmacy - [facility]   Signed by Jennifer Garcia CMA

## 2022-02-15 NOTE — PROGRESS NOTES
"   Patient:   PUMA BAEZ            MRN: [id number]            FIN: [id number]               Age:   82 years     Sex:  Male     :  1935   Associated Diagnoses:   Anemia, blood loss; Dysuria; Penile swelling; Acute cystitis   Author:   Berhane Krause PA-C      Chief Complaint   2018 7:57 AM CDT    Pt here for \"bleeding\" on the skin around his penis x 3 weeks.  Pt states he also can taste \"blood\" when im brushing my teeth.      History of Present Illness   Chief complaint and symptoms noted above and confirmed with patient   2018:  He had MitraClip place  but then developed MI followed by DVT followed by embolic stroke with left hemiparesis  he spent a month at [facility]  his daughter (Alma Delia) is living with him  they are arranging [facility] to come and do home therapy  he had swelling in his right lower leg today (it has been wrapped every day but not today)  he has some constipation due to iron supplement, he is advised to start Miralax      2018:  still having right leg edema,  is seeing accupuncture for this  he has fired home PT because he does not think he needs them  has seen [facility] for [facility] edema but he stopped going there because he did not think it was effective  he is wearing a compression stocking on that leg during the day  he has stopped following up with neurology because he did not think they were doing much for him  needs CBC and INR today  needs med refills  he has seen accupuncture for his left thumb weakness and it thinks it is improving     2018:  here today with his daughter  has been to accupuncture once  right LLE is still swollen, will be starting PT at [facility] soon  had follow up with neurology , no changes made  has appt 3/13 with interventional radiology to discuss removal of IVC filter      2018: as above  is on 2 81 mg aspirin daily  3 week hx of blood from skin around penis, no blood in urine  is still SOB and still has some " ROONEY but these sxs are improving    he saw his cardiologist, Dr Lawrence, the beginning of August and will follow up with him in Sept            Review of Systems   Respiratory:  Shortness of breath.    Cardiovascular:  Decreased exercise tolerance.    Genitourinary:  No dysuria, No hematuria.       Health Status   Allergies:    Allergic Reactions (Selected)  Severity Not Documented  Narcotics (Severe constipation)  Vicodin (Urinary retention)   Medications:  (Selected)   Prescriptions  Prescribed  FLUoxetine 20 mg oral capsule: 1 cap(s) ( 20 mg ), po, daily, # 90 cap(s), 3 Refill(s), Type: Maintenance  aspirin 81 mg oral tablet: 2 tab(s) ( 162 mg ), po, daily, # 100 tab(s), 0 Refill(s), Type: Maintenance, OTC (Rx)  ferrous gluconate 324 mg (37.5 mg elemental iron) oral tablet: 1 tab(s) ( 324 mg ), po, daily, # 90 tab(s), 3 Refill(s), Type: Maintenance  finasteride 5 mg oral tablet: 1 tab(s) ( 5 mg ), PO, Daily, # 90 tab(s), 3 Refill(s), Type: Maintenance  furosemide 40 mg oral tablet: 1 tab(s) ( 40 mg ), PO, bid, # 60 tab(s), 1 Refill(s), Type: Maintenance  losartan 25 mg oral tablet: 1 tab(s) ( 25 mg ), PO, Daily, # 90 tab(s), 3 Refill(s), Type: Maintenance  metoprolol succinate 50 mg oral tablet, extended release: 1 tab(s) ( 50 mg ), PO, Daily, # 90 tab(s), 3 Refill(s), Type: Maintenance  Documented Medications  Documented  acetaminophen 325 mg oral capsule: 1 cap(s) ( 325 mg ), po, tid, 0 Refill(s), Type: Maintenance  amLODIPine 5 mg oral tablet: 1 tab(s) ( 5 mg ), PO, Daily, # 30 tab(s), 0 Refill(s), Type: Maintenance  atorvastatin 40 mg oral tablet: 1 tab(s) ( 40 mg ), Oral, daily, Instructions: Per Cardio note 8/8/2018, 0 Refill(s), Type: Maintenance  clotrimazole 1% topical cream: 1 annmarie, top, bid, 0 Refill(s), Type: Maintenance  Suspended  potassium chloride: ( 10 mEq ), daily, 0 Refill(s), Type: Maintenance   Problem list:    All Problems (Selected)  Embolic cerebral infarction / SNOMED CT 6413841098 /  Confirmed  Bilateral inguinal hernia / SNOMED CT 696716819 / Confirmed  Hematoma / SNOMED CT 8800023656 / Confirmed  Abnormal glucose / SNOMED CT 318961243 / Confirmed  Latent tuberculosis / SNOMED CT 48227227 / Confirmed  Anemia, blood loss / SNOMED CT 1664557682 / Confirmed  Hernia, ventral / SNOMED CT 3911633178 / Confirmed  Obesity / SNOMED CT 5851812622 / Probable  History of deep venous thrombosis / SNOMED CT 9093550651 / Confirmed  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 / SNOMED CT 9671831918 / Confirmed  S/P mitral valve clip implantation / SNOMED CT 3466435341 / Confirmed  S/P mitral valve clip implantation / SNOMED CT 2298514873 / Confirmed  Insomnia / SNOMED CT 300184946 / Confirmed  History of MI (myocardial infarction) / SNOMED CT 1691968061 / Confirmed  Nonrheumatic mitral valve regurgitation / SNOMED CT 459420157 / Confirmed  Esophageal reflux / SNOMED CT 098570535 / Confirmed  Lumbar spondylosis / SNOMED CT 356956467 / Confirmed  Epistaxis / SNOMED CT 432491918 / Confirmed  Hyperplasia of prostate / SNOMED CT 209094373 / Confirmed  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 07303895 / Confirmed  Urinary retention / SNOMED CT 564153790 / Confirmed  Acute posthemorrhagic anemia / SNOMED CT 466457602 / Confirmed  Left hemiparesis / SNOMED CT 345733387 / Confirmed  Pain of right thumb / SNOMED CT 328020009 / Confirmed  Barretts esophagus / SNOMED CT 709523855 / Confirmed  Chronic alcohol use / SNOMED CT 026070709 / Confirmed  Hematuria / SNOMED CT 364043292 / Confirmed  Foot pain / SNOMED CT 979085411 / Confirmed  CHF (congestive heart failure) / SNOMED CT 65649530 / Confirmed  Hypertensive heart disease with heart failure / SNOMED CT 48671193 / Confirmed  CAD (coronary artery disease) / SNOMED CT 34177724 / Confirmed  Hyperlipidemia / SNOMED CT 62607461 / Confirmed  Tuberculosis / SNOMED CT 95013326 / Confirmed      Histories   Past Medical History:    Active  Hematoma (1046592571):  "Onset on 12/15/2017 at 82 years.  Embolic cerebral infarction (0238528783): Onset on 12/10/2017 at 82 years.  CHF (congestive heart failure) (28481086): Onset in the month of 11/2008 at 73 years  History of MI (myocardial infarction) (0347556977): Onset in the month of 11/2007 at 72 years  Barretts esophagus (296758771): Onset in the month of 1/2003 at 67 years  Traumatic blindness of left eye (83143202): Onset in 1999 at 64 years.  Comments:  8/18/2010 CDT 2:58 PM CDT - Olivia Coates CMA  \"ruptured blood vessel\"  Hypertensive heart disease with heart failure (99355528): Onset in 1989 at 54 years.  Latent tuberculosis (78469734)  CAD (coronary artery disease) (34793143)  Acute posthemorrhagic anemia (748141358)  Hyperlipidemia (71450534)  Epistaxis (542254733)  Comments:  1/18/2018 CST 10:33 AM Thuy Topete  Chronic  Abnormal glucose (780689421)  Foot pain (408166375)  Nonrheumatic mitral valve regurgitation (018703842)  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 (6697535993)  Hematuria (603062961)  History of deep venous thrombosis (8274688097)  Comments:  1/18/2018 CST 9:57 AM Thuy Topete  Right lower extremity.  Iliac vein.  Anemia, blood loss (6584158932)  Tuberculosis (55291336)  Comments:  1/18/2018 CST 10:02 AM Thuy Topete  Patient tests positive.  See family history.  Bilateral inguinal hernia (172679850)  Hernia, ventral (2738691416)  Hyperplasia of prostate (596694165)  Comments:  1/18/2018 CST 10:24 AM Thuy Topete  Benign  Insomnia (112948677)  Pain of right thumb (102261913)  Comments:  1/18/2018 CST 10:36 AM Thuy Topete  Chronic  Chronic alcohol use (442112939)  Esophageal reflux (461317584)  Lumbar spondylosis (602734445)  Resolved  Inpatient stay (562906642): Onset on 12/21/2017 at 82 years.  Resolved on 1/18/2018 at 82 years.  Comments:  1/31/2018 CST 1:09 PM CST - Blossom Varela  @[facility], [place], [place] - Embolic cerebral " infarction    2018 CST 1:14 PM CST - Blossom Varela  [facility] not ANW  H/O herpes zoster (8238860843): Onset on 2009 at 73 years.  Resolved.  Age-related cataract of both eyes (000217679):  Resolved.  Acute kidney injury (97832707):  Resolved.  History of fracture of right ankle (2789715250):  Resolved.  Comments:  2018 CST 10:06 AM TRISTIN Hopkins Cindie  Right  Burst blood vessel in eye (276680807):  Resolved.  Comments:  2018 CST 10:05 AM TRISTIN Hopkins  Thuy  Left  H/O fracture of wrist (5858594397):  Resolved.  Comments:  2018 CST 10:08 AM TRISTIN Hopkins  Thuy  Right   Family History:    Cancer  Brother ()  Comments:  2010 3:29 PM - Jaxon Olivia OLEARY  Lung  Brother  Comments:  2010 3:29 PM - Jaxon OLEARYOlivia  Oral  Brother ()  Comments:  2010 3:29 PM - Jaxon OLEARYOlivia  colon  Brother ()  Leukemia  Sister (Adrinana, )  Lymphoma  Sister  Rheumatoid arthritis  Brother  TB - Tuberculosis  Father ()  Mother ()  HTN - Hypertension  Brother ()  Sister (Adrianna, )  Sister  Brother ()     Procedure history:    History of inferior vena cava filter placement (2754630811) on 12/10/2017 at 82 Years.  Mitral valve clip (3448318039) on 2017 at 82 Years.  Comments:  2018 1:16 PM - Blossom Varela  Complicated by air embolization and intermedius vein graft  PCI - Percutaneous coronary intervention (3958719899) on 2017 at 82 Years.  Comments:  2018 1:18 PM - Leia Varealri  via left femoral artery with coronary angiogram.  Intraaortic balloon pump placed and later removed.  Coronary angiogram (80728763) on 2017 at 81 Years.  Extracapsular cataract extraction and insertion of intraocular lens (027054925) on 2017 at 81 Years.  Comments:  2017 12:37 PM - Dalila Rondon.  Extracapsular cataract extraction and insertion of intraocular lens (819849449) on 2017 at 81  Years.  Comments:  8/30/2017 1:44 PM - Dalila Rondon.  Coronary angiogram (74216327) on 3/6/2017 at 81 Years.  Repair of recurrent ventral hernia w/mesh (207156609) on 8/21/2012 at 76 Years.  Repair of epigastric hernia (979352825) on 3/15/2012 at 76 Years.  right shoulder rotator cuff repair in the month of 8/2010 at 74 Years.  Comments:  8/18/2010 3:06 PM - RENARD GORMAN  Colonoscopy (633958409) in the month of 3/2009 at 73 Years.  Comments:  8/18/2010 3:02 PM - RENARD GORMAN  Normal  CABG x 2 - Coronary artery bypass grafts x 2 (093741029) in the month of 11/2007 at 72 Years.  laser for photocoagulation in the month of 3/2007 at 71 Years.  EGD in the month of 1/2003 at 67 Years.  Flexible sigmoidoscopy (47019006) on 10/12/2000 at 65 Years.  Flexible sigmoidoscopy (27419531) on 10/19/1993 at 58 Years.  right shoulder decompression in 1989 at 54 Years.  Wrist Fracture in 1987 at 52 Years.  Comments:  8/18/2010 3:07 PM - RENARD GORMAN  Compression fracutre of foot in 1978 at 43 Years.  L ankle compression in 1975 at 40 Years.  Bilateral vasectomy (790442726) in 1975 at 40 Years.  Back (108091860) in 1975 at 40 Years.  Comments:  1/18/2018 10:26 AM - Thuy Hopkins  Lumbar and thoracic.    1/18/2018 10:23 JACOB - Thuy Hopkins  History of spine surgery.  Rotator cuff repair (852650441) in 1964 at 29 Years.  Comments:  2/24/2012 10:25 AM - Berhane Krause PA-C.  left shoulder  Hernia repair (17962410).  Comments:  8/18/2010 3:05 PM - RENARD GORMAN  Bilateral, Laparoscopy  vitreoretinal surgery.  ORIF - Open reduction of fracture of ankle with internal fixation (174710360736828).  Comments:  1/18/2018 10:11 AM - Thuy Hopkins  Right   Social History:        Alcohol Assessment: Current            Current                     Comments:                      08/18/2010 - Jaxon CMA, Renard                     rare      Tobacco Assessment: Denies Tobacco Use      Substance Abuse Assessment: Denies Substance Abuse       Home and Environment Assessment            Marital status: .        Physical Examination   Vital Signs   8/28/2018 7:57 AM CDT Temperature Tympanic 97.5 DegF  LOW    Peripheral Pulse Rate 60 bpm    Pulse Site Radial artery    Respiratory Rate 16 br/min    Systolic Blood Pressure 162 mmHg  HI    Diastolic Blood Pressure 90 mmHg  HI    Mean Arterial Pressure 114 mmHg    BP Site Right arm    BP Systolic Repeat 154 mmHg    BP Diastolic Repeat 86 mmHg    BP Site Repeat Right arm    Oxygen Saturation 99 %    Vital Signs Comments Pulse irregular      Measurements from flowsheet : Measurements   8/28/2018 7:57 AM CDT Height Measured - Standard 68 in    Weight Measured - Standard 192 lb    BSA 2.04 m2    Body Mass Index 29.19 kg/m2  HI      General:  No acute distress.    Respiratory:  Lungs are clear to auscultation.    Cardiovascular:  Regular rhythm, holosystolic murmur.    Genitourinary:  on genital exam,  foreskin cannot be retracted over the glans because of swelling around the glans,  there is some purulent discharge around the penis.    Psychiatric:  Appropriate mood & affect.       Review / Management   Results review:  Lab results   8/28/2018 8:47 AM CDT UA pH 7.0    UA Specific Gravity 1.015    UA Glucose NEGATIVE    UA Bilirubin NEGATIVE    UA Ketones NEGATIVE    Urine Occult Blood 2+    UA Protein NEGATIVE    UA Nitrite NEGATIVE    UA Leukocyte Esterase 2+     .         Interpretation: will also get a urine culture, patient will be informed if culture results in a change of therapy.       Impression and Plan   Diagnosis     Anemia, blood loss (YRQ63-NT D50.0).     Dysuria (VGP59-HJ R30.0).     Penile swelling (OSK82-KV N48.89).     Acute cystitis (EHB71-ZB N30.01).     Summary:  will treat with Bactrim DS for 10 days,  and will have him follow up with urology because of the penile swelling.    Orders     Orders   Lab (Gen Lab  Reference Lab):  Culture, Urine, Routine* ([facility]) (Order): Specimen Type:  Urine (Clean Catch), Collection Date: 8/28/2018 8:19 AM CDT  POC URINALYSIS, UA* ([facility]) (Order): Specimen Type: Urine, Collection Date: 8/28/2018 8:19 AM CDT  Requests (Lab):  CBC w/ Diff/Plt (Request) (Order): Priority: Urgent, Anemia, blood loss  Dysuria  Penile swelling  Basic Metabolic Panel (Request) (Order): Priority: Urgent, Anemia, blood loss  Dysuria  Penile swelling.     Orders   Pharmacy:  Bactrim  mg-160 mg oral tablet (Prescribe): 1 tab(s), PO, BID, x 10 day(s), # 20 tab(s), 0 Refill(s), Type: Maintenance, Pharmacy: [id number], 1 tab(s) Oral bid,x10 day(s).       Orders   Requests (Consults / Referrals):  Referral (Request) (Order): 8/28/2018 9:34 AM CDT, Referred to: Urology, Penile swelling.       Orders   Charges (Evaluation and Management):  [id number] office outpatient visit 15 minutes (Charge) (Order): Quantity: 1, Penile swelling  Acute cystitis.

## 2022-02-15 NOTE — NURSING NOTE
Anticoagulation Therapy Entered On:  4/9/2020 10:24 AM CDT    Performed On:  4/9/2020 10:23 AM CDT by Darlene Thomas RN               Warfarin Management   Week 1 Sunday Dose :   2.5    Week 1 Monday Dose :   2.5    Week 1 Tuesday Dose :   2.5    Week 1 Wednesday Dose :   5    Week 1 Thursday Dose :   2.5    Week 1 Friday Dose :   2.5    Week 1 Saturday Dose :   2.5    Week 1 Dose Total :   20    Week 2 Sunday Dose :   2.5    Week 2 Monday Dose :   2.5    Week 2 Tuesday Dose :   2.5    Week 2 Wednesday Dose :   2.5    Week 2 Thursday Dose :   2.5    Week 2 Friday Dose :   2.5    Week 2 Saturday Dose :   2.5    Week 2 Dose Total :   17.5    Indication :   Atrial fibrillation, DVT   INR Range :   2.0 - 3.0   INR Therapeutic Range :   Yes   Darlene Thomas RN - 4/9/2020 10:23 AM CDT   Warfarin Management and Results Grid   Signs of Thrombolic :   No   Signs of Warfarin Intolerance :   No   Changes in Diet or Alcohol Intake :   No   Changes in Medication or Antibiotics :   No   Unusual Bleeding or Bruising :   No   Rectal Bleeding or Black Stools :   No   Vitamin K Food Handout :   No   Heart Valve Replacement :   No   Darlene Thomas RN - 4/9/2020 10:23 AM CDT   Anticoagulation Recheck :   2 weeks   Warfarin Special Instructions :   INR = 2.2 per [facility]. continue Warfaron 2.5mg daily. Recheck INR in 2 weeks per protocol. Advised Cielo ORTEGA by phone. Orders faxed.    Darlene Thomas RN - 4/9/2020 10:23 AM CDT

## 2022-02-15 NOTE — PROGRESS NOTES
Patient:   PUMA BAEZ            MRN: [id number]            FIN: [id number]               Age:   84 years     Sex:  Male     :  1935   Associated Diagnoses:   CAD (coronary artery disease); CHF (congestive heart failure); Epistaxis   Author:   Berhane Krause PA-C      Visit Information   Accompanied by:  daughter Chelsy.       Chief Complaint   3/4/2020 2:03 PM CST     Pt here for a Post hospital visit for epistaxis.  Pt is c/o increased fatigue      History of Present Illness   Chief complaint and symptoms noted above and confirmed with patient   he was hospitalized at [place] from  to  for uncontrolled epistaxis  still feeling fatigued, some dizziness  there is a question about whether he is taking his isosorbide dintrate  BP is controlled today  Hgb today is 11.0 which is increased from 9.3 on   he will be having PT come to the house twice a week           Review of Systems   Constitutional:  Fatigue.    Respiratory:  Shortness of breath, Cough.    Cardiovascular:  No chest pain.    Gastrointestinal:  Negative.    Neurologic:  Dizziness.       Health Status   Allergies:    Allergic Reactions (Selected)  Severity Not Documented  Narcotics (Severe constipation)  Vicodin (Urinary retention)   Medications:  (Selected)   Prescriptions  Prescribed  FLUoxetine 20 mg oral capsule: = 2 cap(s) ( 40 mg ), po, daily, # 90 cap(s), 3 Refill(s), Type: Maintenance, Pharmacy: [facility] #[id number]  aspirin 81 mg oral tablet: 2 tab(s) ( 162 mg ), po, daily, # 100 tab(s), 0 Refill(s), Type: Maintenance, OTC (Rx)  ferrous gluconate 324 mg (37.5 mg elemental iron) oral tablet: 1 tab(s) ( 324 mg ), po, daily, # 90 tab(s), 3 Refill(s), Type: Maintenance  finasteride 5 mg oral tablet: = 1 tab(s) ( 5 mg ), PO, Daily, # 90 tab(s), 3 Refill(s), Type: Maintenance, Pharmacy: [facility] #[id number], 1 tab(s) Oral daily  hydrALAZINE 10 mg oral tablet: = 1 tab(s) ( 10 mg ), Oral, q8 hrs, # 90 tab(s), 3 Refill(s), Type:  Maintenance, Pharmacy: [facility], 1 tab(s) Oral q8 hrs  metoprolol succinate 50 mg oral tablet, extended release: = 1 tab(s) ( 50 mg ), PO, Daily, # 90 tab(s), 3 Refill(s), Type: Maintenance, Pharmacy: [facility], 1 tab(s) Oral daily  tamsulosin 0.4 mg oral capsule: 1 cap(s), Oral, daily, # 270 unknown unit, Type: Soft Stop, Pharmacy: [facility]  warfarin 2.5 mg oral tablet: See Instructions, Instructions: TAKE 1 TABLET BY MOUTH EVERY OTHER DAY ALTERNATE WITH TAKE 0.5 TABLET EVERY OTHER DAY, # 135 tab(s), 1 Refill(s), Type: Maintenance, Pharmacy: [facility]  Documented Medications  Documented  acetaminophen 325 mg oral capsule: 1 cap(s) ( 325 mg ), po, tid, 0 Refill(s), Type: Maintenance  atorvastatin 80 mg oral tablet: = 1 tab(s) ( 80 mg ), Oral, daily, # 30 tab(s), 0 Refill(s), Type: Maintenance  bumetanide 2 mg oral tablet: = 1 tab(s) ( 2 mg ), Oral, daily, # 30 tab(s), 0 Refill(s), Type: Maintenance  clopidogrel 75 mg oral tablet: = 1 tab(s) ( 75 mg ), Oral, daily, # 30 tab(s), 0 Refill(s), Type: Maintenance  docusate sodium 100 mg oral capsule: = 1 cap(s) ( 100 mg ), Oral, qhs, PRN: for constipation, # 20 cap(s), 0 Refill(s), Type: Maintenance  isosorbide dinitrate 10 mg oral tablet: = 1 tab(s) ( 10 mg ), Oral, bid, # 120 tab(s), 0 Refill(s), Type: Maintenance  losartan 50 mg oral tablet: = 1 tab(s) ( 50 mg ), Oral, daily, # 30 tab(s), 0 Refill(s), Type: Maintenance  potassium chloride 20 mEq oral tablet, extended release: = 1 tab(s) ( 20 mEq ), Oral, bid, # 60 tab(s), 0 Refill(s), Type: Maintenance   Problem list:    All Problems (Selected)  Embolic cerebral infarction / SNOMED CT 4830162542 / Confirmed  Bilateral inguinal hernia / SNOMED CT 356902716 / Confirmed  Hematoma / SNOMED CT 7652806082 / Confirmed  Abnormal glucose / SNOMED CT 878629408 / Confirmed  Anticoagulated on warfarin / SNOMED CT 38443443 / Confirmed  Latent tuberculosis / SNOMED CT 40370395 / Confirmed  Anemia, blood loss / SNOMED  CT 9774827989 / Confirmed  Hernia, ventral / SNOMED CT 8961877438 / Confirmed  Obesity / SNOMED CT 8897294248 / Probable  History of deep venous thrombosis / SNOMED CT 5067227712 / Confirmed  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 / SNOMED CT 6144579930 / Confirmed  S/P mitral valve clip implantation / SNOMED CT 4712244921 / Confirmed  S/P mitral valve clip implantation / SNOMED CT 1348151325 / Confirmed  Insomnia / SNOMED CT 385839688 / Confirmed  History of MI (myocardial infarction) / SNOMED CT 2641122110 / Confirmed  Nonrheumatic mitral valve regurgitation / SNOMED CT 273535371 / Confirmed  Esophageal reflux / SNOMED CT 328729988 / Confirmed  Lumbar spondylosis / SNOMED CT 696311818 / Confirmed  Epistaxis / SNOMED CT 831202484 / Confirmed  Hyperplasia of prostate / SNOMED CT 553130581 / Confirmed  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 44813790 / Confirmed  Urinary retention / SNOMED CT 038327532 / Confirmed  Acute posthemorrhagic anemia / SNOMED CT 699664113 / Confirmed  Left hemiparesis / SNOMED CT 245142129 / Confirmed  Paroxysmal atrial fibrillation / SNOMED CT 680895834 / Confirmed  Pain of right thumb / SNOMED CT 214407050 / Confirmed  Barretts esophagus / SNOMED CT 501556650 / Confirmed  Chronic alcohol use / SNOMED CT 393921108 / Confirmed  Hematuria / SNOMED CT 334093487 / Confirmed  Foot pain / SNOMED CT 618347343 / Confirmed  Depression / SNOMED CT 83030294 / Confirmed  CHF (congestive heart failure) / SNOMED CT 22808955 / Confirmed  Hypertensive heart disease with heart failure / SNOMED CT 50759106 / Confirmed  Chronic mitral valve regurgitation / SNOMED CT 63239384 / Confirmed  CAD (coronary artery disease) / SNOMED CT 21821006 / Confirmed  Hyperlipidemia / SNOMED CT 63598178 / Confirmed  Tuberculosis / SNOMED CT 93932854 / Confirmed      Histories   Past Medical History:    Active  Hematoma (8488520899): Onset on 12/15/2017 at 82 years.  Embolic cerebral infarction  "(5559618354): Onset on 12/10/2017 at 82 years.  CHF (congestive heart failure) (97705819): Onset in the month of 11/2008 at 73 years  History of MI (myocardial infarction) (8823735907): Onset in the month of 11/2007 at 72 years  Barretts esophagus (874673448): Onset in the month of 1/2003 at 67 years  Traumatic blindness of left eye (72857478): Onset in 1999 at 64 years.  Comments:  8/18/2010 CDT 2:58 PM CDT - Olivia Coates CMA  \"ruptured blood vessel\"  Hypertensive heart disease with heart failure (76814554): Onset in 1989 at 54 years.  Latent tuberculosis (78588816)  CAD (coronary artery disease) (85973998)  Acute posthemorrhagic anemia (538938836)  Hyperlipidemia (69377895)  Epistaxis (178424253)  Comments:  1/18/2018 CST 10:33 AM Thuy Topete  Chronic  Abnormal glucose (568557117)  Foot pain (555496707)  Nonrheumatic mitral valve regurgitation (053116565)  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 (5352384802)  Hematuria (519895346)  History of deep venous thrombosis (7100290487)  Comments:  1/18/2018 CST 9:57 AM Thuy Topete  Right lower extremity.  Iliac vein.    7/29/2019 CDT 9:53 AM CDT - Blossom Varela  07/25/2019 - right leg DVT  Anemia, blood loss (0833611158)  Tuberculosis (94853863)  Comments:  1/18/2018 CST 10:02 AM Thuy Topete  Patient tests positive.  See family history.  Bilateral inguinal hernia (888905710)  Hernia, ventral (5324550589)  Hyperplasia of prostate (857161026)  Comments:  1/18/2018 CST 10:24 AM Thuy Topete  Benign  Insomnia (437541312)  Pain of right thumb (797515136)  Comments:  1/18/2018 CST 10:36 AM Thuy Topete  Chronic  Chronic alcohol use (922766540)  Esophageal reflux (254313869)  Lumbar spondylosis (101007489)  Paroxysmal atrial fibrillation (745326512)  Depression (30690872)  Chronic mitral valve regurgitation (14452423)  Resolved  Inpatient stay (181170375): Onset on 11/8/2019 at 84 years.  Resolved on 11/15/2019 at 84 " years.  Comments:  12/3/2019 CST 2:40 PM Tere Escudero  @[place], [place] - CHF (congestive heart failure), NYHA class 1, acute on chronic, combined.  Inpatient stay (070199617): Onset on 2019 at 83 years.  Resolved on 2019 at 83 years.  Comments:  2019 CDT 10:15 AM Blossom Aly  @[place], [place] - Acute on chronic systolic congestive heart failure  Inpatient stay (373231536): Onset on 2019 at 83 years.  Resolved on 2019 at 83 years.  Comments:  2019 CDT 9:47 AM Blossom Aly  @[place], [place] - Acute on chronic systolic and diastolic CHF exacerbation secondary to severe mitral regurgitation  Inpatient stay (964491903): Onset on 2017 at 82 years.  Resolved on 2018 at 82 years.  Comments:  2018 CST 1:09 PM Blossom Art  @[place], [place] - Embolic cerebral infarction    2018 CST 1:14 PM Blossom Art  [facility] not [facility]  H/O herpes zoster (7557553986): Onset on 2009 at 73 years.  Resolved.  Age-related cataract of both eyes (612440666):  Resolved.  Acute kidney injury (30912712):  Resolved.  History of fracture of right ankle (9597111110):  Resolved.  Comments:  2018 CST 10:06 AM Thuy Topete  Right  Burst blood vessel in eye (971027491):  Resolved.  Comments:  2018 CST 10:05 AM Thuy Topete  Left  H/O fracture of wrist (2821382787):  Resolved.  Comments:  2018 CST 10:08 AM Thuy Topete  Right   Family History:    Cancer  Brother ()  Comments:  2010 3:29 PM CDT - Jaxon MAMTA, Olivia  Lung  Brother  Comments:  2010 3:29 PM CDT - Jaxon MAMTA, Olivia  Oral  Brother ()  Comments:  2010 3:29 PM CDT - Jaxon CMA, Olivia  colon  Brother ()  Leukemia  Sister (Adrianna, )  Lymphoma  Sister  Rheumatoid arthritis  Brother  TB - Tuberculosis  Father ()  Mother  ()  HTN - Hypertension  Brother ()  Sister (Adrianna, )  Sister  Brother ()     Procedure history:    Mitral valve clip (0352927369) on 2019 at 84 Years.  History of inferior vena cava filter placement (2165100327) on 12/10/2017 at 82 Years.  Mitral valve clip (3889088714) on 2017 at 82 Years.  Comments:  2018 1:16 PM TRISTIN Varela Leiari  Complicated by air embolization and intermedius vein graft  PCI - Percutaneous coronary intervention (8315501958) on 2017 at 82 Years.  Comments:  2018 1:18 PM TRISTIN Alejo Avery Blossom  via left femoral artery with coronary angiogram.  Intraaortic balloon pump placed and later removed.  Coronary angiogram (07186400) on 2017 at 81 Years.  Extracapsular cataract extraction and insertion of intraocular lens (589585566) on 2017 at 81 Years.  Comments:  2017 12:37 PM Dalila Estrada  Right.  Extracapsular cataract extraction and insertion of intraocular lens (907888050) on 2017 at 81 Years.  Comments:  2017 1:44 PM Dalila Estrada  Left.  Coronary angiogram (00898068) on 3/6/2017 at 81 Years.  Repair of recurrent ventral hernia w/mesh (453885320) on 2012 at 76 Years.  Repair of epigastric hernia (530206908) on 3/15/2012 at 76 Years.  right shoulder rotator cuff repair in the month of 2010 at 74 Years.  Comments:  2010 3:06 PM RENARD BEVERLY  R  Colonoscopy (465295930) in the month of 3/2009 at 73 Years.  Comments:  2010 3:02 PM RENARD BEVERLY  Normal  CABG x 2 - Coronary artery bypass grafts x 2 (761618500) in the month of 2007 at 72 Years.  laser for photocoagulation in the month of 3/2007 at 71 Years.  EGD in the month of 2003 at 67 Years.  Flexible sigmoidoscopy (04027154) on 10/12/2000 at 65 Years.  Flexible sigmoidoscopy (30055339) on 10/19/1993 at 58 Years.  right shoulder decompression in  at 54 Years.  Wrist Fracture in  at 52 Years.  Comments:  2010  3:07 PM RENARD BEVERLY  Compression fracutre of foot in 1978 at 43 Years.  L ankle compression in 1975 at 40 Years.  Bilateral vasectomy (995180304) in 1975 at 40 Years.  Back (065082322) in 1975 at 40 Years.  Comments:  1/18/2018 10:26 AM Thuy Topete  Lumbar and thoracic.    1/18/2018 10:23 AM Thuy Topete  History of spine surgery.  Rotator cuff repair (295306323) in 1964 at 29 Years.  Comments:  2/24/2012 10:25 AM Berhane Waggoner PA-C.  left shoulder  Hernia repair (15734585).  Comments:  8/18/2010 3:05 PM RENARD BEVERLY  Bilateral, Laparoscopy  vitreoretinal surgery.  ORIF - Open reduction of fracture of ankle with internal fixation (336002556047012).  Comments:  1/18/2018 10:11 AM Thuy Topete  Right   Social History:        Alcohol Assessment: Current            Current                     Comments:                      08/18/2010 - Renard Coates CMA                     rare      Tobacco Assessment: Denies Tobacco Use      Substance Abuse Assessment: Denies Substance Abuse      Home and Environment Assessment            Marital status: .        Physical Examination   Vital Signs   3/4/2020 2:03 PM CST Temperature Tympanic 96.5 DegF  LOW    Peripheral Pulse Rate 80 bpm    Pulse Site Radial artery    Respiratory Rate 20 br/min    Systolic Blood Pressure 138 mmHg  HI    Diastolic Blood Pressure 82 mmHg  HI    Mean Arterial Pressure 101 mmHg    BP Site Right arm    Oxygen Saturation 95 %    Vital Signs Comments Pulse Irregular      Measurements from flowsheet : Measurements   3/4/2020 2:03 PM CST Height Measured - Standard 68 in    Weight Measured - Standard 185 lb    BSA 2 m2    Body Mass Index 28.13 kg/m2  HI      General:  No acute distress.    Respiratory:  Lungs are clear to auscultation.    Cardiovascular:  Normal rate, Regular rhythm, No murmur, No edema.    Psychiatric:  Appropriate mood & affect.       Review / Management   Results review:  Lab results   3/4/2020  1:59 PM CST Sodium Level 141 mmol/L    Potassium Level 3.8 mmol/L    Chloride Level 104 mmol/L    CO2 Level 30 mmol/L    AGAP 7    Glucose Level 118 mg/dL    BUN 21 mg/dL    Creatinine Level 1.24 mg/dL    BUN/Creat Ratio 17    eGFR  >60    eGFR Non-African American 56    Calcium Level 9.1 mg/dL    WBC 5.8    RBC 3.75    Hgb 11.0 g/dL    Hct 35.3 %    MCV 94 fL    MCH 29.3 pg    MCHC 31.2 g/dL    RDW 15.9 %    Platelet 184    MPV 9.9 fL    Protime 23.6    INR 2.1   .    Documentation reviewed:  Records from referring facility.       Impression and Plan   Diagnosis     CAD (coronary artery disease) (IUQ17-SA I25.10).     CHF (congestive heart failure) (NZI37-UT I50.9).     Epistaxis (AOZ61-SO R04.0).     Course:  Improving.    Summary:  Hgb is improving and hopefully his fatigue will improve, will get pharmacist consult   will recheck Hgb when he comes in for pharmacist consult  INR today is 2.1, continue on same dose, recheck in 2 weeks.    Orders     Orders   Requests (Consults / Referrals):  Pharmacist Consult (Request) (Order): Referred to: [facility], Reason: has labile HTN, s/p mitral valve clip, CVA, CAD  is still having dizziness and fatigue  recent anemia due to uncontrolled epistaxis  please review meds, CAD (coronary artery disease)  CHF (congestive heart failure).     Orders   Charges (Evaluation and Management):  72919 office outpatient visit 25 minutes (Charge) (Order): Quantity: 1, Epistaxis  CHF (congestive heart failure)  CAD (coronary artery disease).

## 2022-02-15 NOTE — NURSING NOTE
Anticoagulation Therapy Entered On:  3/4/2020 4:07 PM CST    Performed On:  3/4/2020 4:04 PM CST by Karena Freeman RN               Warfarin Management   Week 1 Sunday Dose :   2.5    Week 1 Monday Dose :   1.25    Week 1 Tuesday Dose :   2.5    Week 1 Wednesday Dose :   2.5    Week 1 Thursday Dose :   2.5    Week 1 Friday Dose :   2.5    Week 1 Saturday Dose :   2.5    Week 1 Dose Total :   16.25    Week 2 Sunday Dose :   1.25    Week 2 Monday Dose :   2.5    Week 2 Tuesday Dose :   1.25    Week 2 Wednesday Dose :   2.5    Week 2 Thursday Dose :   1.25    Week 2 Friday Dose :   2.5    Week 2 Saturday Dose :   1.25    Week 2 Dose Total :   12.5    Planned Duration :   Indefinite   Indication :   Stroke   International Normalization Ratio :   2.1    INR Range :   2.0 - 3.0   Anticoagulation Recheck :   2 weeks   Warfarin Physician :   Berhane Krause PA-C   Warfarin Special Instructions :   INR = 2.1 per [facility] lab on 3/4/20 Patient is to stay on 1.25 mg warfarin on Mon and 2.5mg the rest of the days of the week Recheck INR in 2 weeks per DG. Daughter Tammy advised via call.   Karena Freeman RN - 3/4/2020 4:04 PM CST

## 2022-02-15 NOTE — CARE COORDINATION
Care Coordinator ER Discharge Note        Sources of Information:  [ ] Patient, family member, or caregiver (Please list): CC tried contacting pt at 1230pm, no answer and no vm. 3-29-17  [ X] Hospital discharge summary  [ ] Hospital fax  [ ] List of recent hospitalizations or ED visits  [ ] Other:     Discharged From: [facility] ED  Discharge Date: 03-28-17    Diagnosis/Problem: Nose Bleed    Medication Changes: [ ] Yes [ X] No   Medication List Updated: [ ] Yes [ X] No    Needs Referral or Lab: [ ] Yes [X ] No    Needs Follow-up Appointment:  [X ] Within 2-3 days of discharge (highly complex visit)  [ ] Within 14 days of discharge (moderately complex visit)    Appointment Made With: LINNETTE  Date: 03-31-17 at 9am    Additional Information Needed and Requested:  [ ] Yes:  [X ] NoF/u appt completed with DAVIDGf/u appointment completed.

## 2022-02-15 NOTE — TELEPHONE ENCOUNTER
---------------------  From: Vishnu Funes CMA ([facility] Message Pool ([id number]_[facility]))   Sent: 3/24/2020 8:35:58 AM CDT  Subject: [facility] Lab order     This pt was unable to get repeat Hgb done recently ordered by [facility] for visit with MTM.  MTM only doing phone visits at this time.  I called [facility] [phone] to see if we could add the Hgb to pt upcoming home draw for lipid panel.  Staff member states they could add it but need a written order faxed to them.  Per [facility] order for Hgb was sent to [facility].  [phone].  Confirmation received.  Vishnu Funes CMA

## 2022-02-15 NOTE — TELEPHONE ENCOUNTER
---------------------  From: Karena Freeman RN   Sent: 11/21/2019 3:30:22 PM CST  Subject: Over Due INR     Call to patient. Request he schedule an INR as soon as possible. Last INR was 10/16/19. He asks that I call his daughter to arrange this. I did leave a message on her cell phone to call back. When she returns this call we can arrange INR.  Patient is advised to be seen in ER if symptoms of bleeding, these symptoms reviewed.Talked to Tammy. She will have him seen week after Thanksgiving. He has post op and pre op appointments coming up at [facility]. She will get him in between these appointments. INR was 2.6 in hospital last week.

## 2022-02-15 NOTE — CARE COORDINATION
Pt appears on [facility] chronic disease panel as out of parameters for BP.  Placed new RTC for HTN visit due now.

## 2022-02-15 NOTE — NURSING NOTE
Anticoagulation Therapy Entered On:  3/11/2020 1:58 PM CDT    Performed On:  3/11/2020 1:57 PM CDT by Yamila Bedolla RN               Warfarin Management   Week 1 Sunday Dose :   2.5    Week 1 Monday Dose :   1.25    Week 1 Tuesday Dose :   2.5    Week 1 Wednesday Dose :   2.5    Week 1 Thursday Dose :   2.5    Week 1 Friday Dose :   2.5    Week 1 Saturday Dose :   2.5    Week 1 Dose Total :   16.25    Planned Duration :   Indefinite   Indication :   Atrial fibrillation, DVT   Warfarin Management Comments :   Call received from Xi nurse chloe Meyer   International Normalization Ratio :   2.7    INR Range :   2.0 - 3.0   INR Therapeutic Range :   Yes   Yamial Bedolla RN - 3/11/2020 1:57 PM CDT   Warfarin Management and Results Grid   Signs of Thrombolic :   No   Signs of Warfarin Intolerance :   No   Changes in Diet or Alcohol Intake :   No   Changes in Medication or Antibiotics :   No   Unusual Bleeding or Bruising :   No   Rectal Bleeding or Black Stools :   No   Vitamin K Food Handout :   No   Heart Valve Replacement :   No   Yamila Bedolla RN - 3/11/2020 1:57 PM CDT   Anticoagulation Recheck :   2 weeks   Warfarin Special Instructions :   Per protocol continue warfarin 1.25 mg Mondays and 2.5 mg ROW; recheck INR in 2 weeks; Xi notified by phone.   Yamila Bedolla RN - 3/11/2020 1:57 PM CDT

## 2022-02-15 NOTE — CARE COORDINATION
CC received a PHQ-9 from cardiac rehab. Pt scored high and per notes pt is feeling frustrated when he is unable to do the activities he was once able to do. Per Vishnu OLEARY this was already addressed with DAVIDG. No further action needed by CC.

## 2022-02-15 NOTE — TELEPHONE ENCOUNTER
---------------------  From: Nicole Metcalf LPN (Phone Messages Pool ([id number]_[facility]))   To: Solitario Francisco MD;     Sent: 3/25/2020 8:52:44 AM CDT  Subject: chest pain/SOB     TR- LELAND advised ED visit    Phone Message    PCP:   LINNETTE      Time of Call:  8:45am       Person Calling:  Xi- Nurse with [facility]  Phone number:  _    Note:   Xi transferred to . She is with patient this morning. Last night he was very short of breath and took a nitro. Pt having chest pain and pain radiating down both arms. Respirations of 24. Oxygen 98 when taking breaths as soon as pt starts talking he drops to 90. BP is 138/92. Xi says pt does have Afib and is jumping between 90's-100's for pulse.    Advised ED for SOB, chest pain and radiating pain. Xi will speak with family and let them know.    Last office visit and reason:  1/9/20 post- hospital follow up mitral valveAmber LM at 9:09am stating pt will be going to [facility] ED within 45 minutes- his daughter will be taking him. Xi says she did call ED and notified them that pt is coming in.     Xi asking that DWG and GTG be updated that pt is going to ED and also that she did not do pt's lipid panel or INR as he will be needing a further work up.    If any questions call [phone]---------------------  From: Nicole Metcalf LPN (Phone Messages Pool ([id number]_[facility]))   To: Vince Santiago MD; Berhane Krause PA-C;     Sent: 3/25/2020 9:23:38 AM CDT  Subject: FW: chest pain/SOB     FYInoted---------------------  From: Berhane Krause PA-C   To: Phone Messages Pool ([id number]_[place] [facility] [place]);     Sent: 3/25/2020 2:14:56 PM CDT  Subject: RE: chest pain/SOB     noted

## 2022-02-15 NOTE — PROGRESS NOTES
Patient:   PUMA BAEZ            MRN: [id number]            FIN: [id number]               Age:   83 years     Sex:  Male     :  1935   Associated Diagnoses:   Bursitis of right knee   Author:   Berhane Krause PA-C      Visit Information   Accompanied by:  daughter.       Chief Complaint   2019 10:02 AM CDT   Pt here for Right knee pain and edema from .  Pt states knee is feeling more painful.        Interval History   Chief complaint and symptoms noted above and confirmed with patient   he fell on right knee and developed bursitis, xray at the time showed a small possible fracture but was essentially negative  he has been icing and using a wrap      Health Status   Allergies:    Allergic Reactions (All)  Severity Not Documented  Narcotics (Severe constipation)  Vicodin (Urinary retention)  Canceled/Inactive Reactions (All)  No known allergies   Medications:  (Selected)   Prescriptions  Prescribed  FLUoxetine 20 mg oral capsule: = 2 cap(s) ( 40 mg ), po, daily, # 90 cap(s), 3 Refill(s), Type: Maintenance, Pharmacy: [email or web address] #  aspirin 81 mg oral tablet: 2 tab(s) ( 162 mg ), po, daily, # 100 tab(s), 0 Refill(s), Type: Maintenance, OTC (Rx)  ferrous gluconate 324 mg (37.5 mg elemental iron) oral tablet: 1 tab(s) ( 324 mg ), po, daily, # 90 tab(s), 3 Refill(s), Type: Maintenance  finasteride 5 mg oral tablet: = 1 tab(s) ( 5 mg ), PO, Daily, # 90 tab(s), 3 Refill(s), Type: Maintenance, Pharmacy: [email or web address] #, 1 tab(s) Oral daily  metoprolol succinate 50 mg oral tablet, extended release: = 1 tab(s) ( 50 mg ), PO, Daily, # 90 tab(s), 3 Refill(s), Type: Maintenance, Pharmacy: [email or web address] #, 1 tab(s) Oral daily  tamsulosin 0.4 mg oral capsule: = 1 cap(s) ( 0.4 mg ), po, daily, # 90 cap(s), 3 Refill(s), Type: Maintenance, Pharmacy: [email or web address] #, 1 cap(s) Oral daily  warfarin 2.5 mg oral tablet: See Instructions, Instructions: take 2.5mg ( 1 tab) QOD alternate with  1.25 mg (1/2 tab) QOD, # 30 tab(s), 0 Refill(s), Type: Maintenance, Pharmacy: [facility], take 2.5mg ( 1 tab) QOD alternate with 1.25 mg (1/2 tab) QOD  Documented Medications  Documented  Lovenox: 0 Refill(s), Type: Maintenance  acetaminophen 325 mg oral capsule: 1 cap(s) ( 325 mg ), po, tid, 0 Refill(s), Type: Maintenance  amLODIPine 5 mg oral tablet: 1 tab(s) ( 5 mg ), PO, Daily, # 30 tab(s), 0 Refill(s), Type: Maintenance  atorvastatin 40 mg oral tablet: 1 tab(s) ( 40 mg ), Oral, daily, Instructions: Per Cardio note 8/8/2018, 0 Refill(s), Type: Maintenance  doxylamine: ( 25 mg ), Oral, daily, 0 Refill(s), Type: Maintenance  furosemide 40 mg oral tablet: See Instructions, Instructions: Take 80mg in the AM and 80mg in the afternoon  Per cardio from 7/3/19, 0 Refill(s), Type: Maintenance  losartan 50 mg oral tablet: = 1 tab(s) ( 50 mg ), Oral, daily, # 30 tab(s), 0 Refill(s), Type: Maintenance  potassium chloride 20 mEq oral tablet, extended release: = 1 tab(s) ( 20 mEq ), Oral, bid, # 60 tab(s), 0 Refill(s), Type: Maintenance   Problem list:    All Problems (Selected)  Embolic cerebral infarction / SNOMED CT 9641187280 / Confirmed  Bilateral inguinal hernia / SNOMED CT 740132732 / Confirmed  Hematoma / SNOMED CT 3443397341 / Confirmed  Abnormal glucose / SNOMED CT 658100769 / Confirmed  Anticoagulated on warfarin / SNOMED CT 17502930 / Confirmed  Latent tuberculosis / SNOMED CT 49402430 / Confirmed  Anemia, blood loss / SNOMED CT 5816064351 / Confirmed  Hernia, ventral / SNOMED CT 8209319619 / Confirmed  Obesity / SNOMED CT 9869604564 / Probable  History of deep venous thrombosis / SNOMED CT 9804454907 / Confirmed  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 / SNOMED CT 0159551028 / Confirmed  S/P mitral valve clip implantation / SNOMED CT 7200214456 / Confirmed  S/P mitral valve clip implantation / SNOMED CT 2989011028 / Confirmed  Insomnia / SNOMED CT 090319250 / Confirmed  History of MI  "(myocardial infarction) / SNOMED CT 6078875413 / Confirmed  Nonrheumatic mitral valve regurgitation / SNOMED CT 171876419 / Confirmed  Esophageal reflux / SNOMED CT 919336151 / Confirmed  Lumbar spondylosis / SNOMED CT 485121686 / Confirmed  Epistaxis / SNOMED CT 407023970 / Confirmed  Hyperplasia of prostate / SNOMED CT 923960975 / Confirmed  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 75369024 / Confirmed  Urinary retention / SNOMED CT 577257737 / Confirmed  Acute posthemorrhagic anemia / SNOMED CT 990774811 / Confirmed  Left hemiparesis / SNOMED CT 427344706 / Confirmed  Paroxysmal atrial fibrillation / SNOMED CT 544716273 / Confirmed  Pain of right thumb / SNOMED CT 510497573 / Confirmed  Barretts esophagus / SNOMED CT 405915154 / Confirmed  Chronic alcohol use / SNOMED CT 192432552 / Confirmed  Hematuria / SNOMED CT 883306303 / Confirmed  Foot pain / SNOMED CT 678404975 / Confirmed  Depression / SNOMED CT 18213855 / Confirmed  CHF (congestive heart failure) / SNOMED CT 69950534 / Confirmed  Hypertensive heart disease with heart failure / SNOMED CT 82666713 / Confirmed  Chronic mitral valve regurgitation / SNOMED CT 95452474 / Confirmed  CAD (coronary artery disease) / SNOMED CT 57461502 / Confirmed  Hyperlipidemia / SNOMED CT 41398391 / Confirmed  Tuberculosis / SNOMED CT 02471543 / Confirmed      Histories   Past Medical History:    Active  Hematoma (4501969411): Onset on 12/15/2017 at 82 years.  Embolic cerebral infarction (4319201015): Onset on 12/10/2017 at 82 years.  CHF (congestive heart failure) (80416498): Onset in the month of 11/2008 at 73 years  History of MI (myocardial infarction) (8486881796): Onset in the month of 11/2007 at 72 years  Barretts esophagus (749184288): Onset in the month of 1/2003 at 67 years  Traumatic blindness of left eye (63924926): Onset in 1999 at 64 years.  Comments:  8/18/2010 CDT 2:58 PM CDT - Jaxon CMA, Olivia  \"ruptured blood vessel\"  Hypertensive heart disease with heart " failure (07481429): Onset in 1989 at 54 years.  Latent tuberculosis (26609450)  CAD (coronary artery disease) (14556355)  Acute posthemorrhagic anemia (563982679)  Hyperlipidemia (47338908)  Epistaxis (614525300)  Comments:  1/18/2018 CST 10:33 AM Thuy Topete  Chronic  Abnormal glucose (835827639)  Foot pain (097861216)  Nonrheumatic mitral valve regurgitation (542950427)  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 (1718796589)  Hematuria (403907268)  History of deep venous thrombosis (3007509341)  Comments:  1/18/2018 CST 9:57 AM Thuy Topete  Right lower extremity.  Iliac vein.    7/29/2019 CDT 9:53 AM Blossom Aly  07/25/2019 - right leg DVT  Anemia, blood loss (5418888785)  Tuberculosis (68487238)  Comments:  1/18/2018 CST 10:02 AM Thuy Topete  Patient tests positive.  See family history.  Bilateral inguinal hernia (792857767)  Hernia, ventral (3413611373)  Hyperplasia of prostate (802281411)  Comments:  1/18/2018 CST 10:24 AM Thuy Topete  Benign  Insomnia (936055412)  Pain of right thumb (698698979)  Comments:  1/18/2018 CST 10:36 AM Thuy Topete  Chronic  Chronic alcohol use (032300087)  Esophageal reflux (758398144)  Lumbar spondylosis (260764385)  Paroxysmal atrial fibrillation (565837347)  Depression (17864476)  Chronic mitral valve regurgitation (80151198)  Resolved  Inpatient stay (529293139): Onset on 9/4/2019 at 83 years.  Resolved on 9/5/2019 at 83 years.  Comments:  9/9/2019 CDT 10:15 AM Blossom Aly  @[email or web address] Acute on chronic systolic congestive heart failure  Inpatient stay (198002203): Onset on 7/22/2019 at 83 years.  Resolved on 7/25/2019 at 83 years.  Comments:  7/29/2019 CDT 9:47 AM Blossom Aly  @[place], [place] - Acute on chronic systolic and diastolic CHF exacerbation secondary to severe mitral regurgitation  Inpatient stay (530925099): Onset on 12/21/2017 at 82 years.  Resolved on  2018 at 82 years.  Comments:  2018 CST 1:09 PM Blossom Art  @[facility], [place], [place] - Embolic cerebral infarction    2018 CST 1:14 PM Blossom Art  [facility] not ANW  H/O herpes zoster (8918534145): Onset on 2009 at 73 years.  Resolved.  Age-related cataract of both eyes (592086159):  Resolved.  Acute kidney injury (01032542):  Resolved.  History of fracture of right ankle (5634851577):  Resolved.  Comments:  2018 CST 10:06 AM Thuy Topete  Right  Burst blood vessel in eye (880522827):  Resolved.  Comments:  2018 CST 10:05 AM Thuy Topete  Left  H/O fracture of wrist (6311631592):  Resolved.  Comments:  2018 CST 10:08 AM Thuy Topete  Right   Family History:    Cancer  Brother ()  Comments:  2010 3:29 PM CDT - Jaxon OLEARYOlivia  Lung  Brother  Comments:  2010 3:29 PM CDT - Jaxon OLEARYOlivia  Oral  Brother ()  Comments:  2010 3:29 PM CDMANOJ - Jaxon OLEARY Olivia  colon  Brother ()  Leukemia  Sister (Adrianna, )  Lymphoma  Sister  Rheumatoid arthritis  Brother  TB - Tuberculosis  Father ()  Mother ()  HTN - Hypertension  Brother ()  Sister (Adrianna, )  Sister  Brother ()     Procedure history:    History of inferior vena cava filter placement (7111721922) on 12/10/2017 at 82 Years.  Mitral valve clip (8148768557) on 2017 at 82 Years.  Comments:  2018 1:16 PM Blossom Art  Complicated by air embolization and intermedius vein graft  PCI - Percutaneous coronary intervention (8221713250) on 2017 at 82 Years.  Comments:  2018 1:18 PM Blossom Art  via left femoral artery with coronary angiogram.  Intraaortic balloon pump placed and later removed.  Coronary angiogram (09856033) on 2017 at 81 Years.  Extracapsular cataract extraction and insertion of intraocular lens (269050209) on 2017 at 81  Years.  Comments:  7/25/2017 12:37 PM IDALIA Rondon Dalila  Right.  Extracapsular cataract extraction and insertion of intraocular lens (655808891) on 6/29/2017 at 81 Years.  Comments:  8/30/2017 1:44 PM IDALIA Rondon Dalila  Left.  Coronary angiogram (94981702) on 3/6/2017 at 81 Years.  Repair of recurrent ventral hernia w/mesh (881091157) on 8/21/2012 at 76 Years.  Repair of epigastric hernia (470875945) on 3/15/2012 at 76 Years.  right shoulder rotator cuff repair in the month of 8/2010 at 74 Years.  Comments:  8/18/2010 3:06 PM RENARD BEVERLY  Colonoscopy (188911583) in the month of 3/2009 at 73 Years.  Comments:  8/18/2010 3:02 PM RENARD BEVERLY  Normal  CABG x 2 - Coronary artery bypass grafts x 2 (277834238) in the month of 11/2007 at 72 Years.  laser for photocoagulation in the month of 3/2007 at 71 Years.  EGD in the month of 1/2003 at 67 Years.  Flexible sigmoidoscopy (77890099) on 10/12/2000 at 65 Years.  Flexible sigmoidoscopy (70471915) on 10/19/1993 at 58 Years.  right shoulder decompression in 1989 at 54 Years.  Wrist Fracture in 1987 at 52 Years.  Comments:  8/18/2010 3:07 PM RENARD BEVERLY  Compression fracutre of foot in 1978 at 43 Years.  L ankle compression in 1975 at 40 Years.  Bilateral vasectomy (100512110) in 1975 at 40 Years.  Back (779955577) in 1975 at 40 Years.  Comments:  1/18/2018 10:26 AM Thuy Topete  Lumbar and thoracic.    1/18/2018 10:23 AM Thuy Topete  History of spine surgery.  Rotator cuff repair (678636000) in 1964 at 29 Years.  Comments:  2/24/2012 10:25 AM Berhane Waggoner PA-C.  left shoulder  Hernia repair (04432276).  Comments:  8/18/2010 3:05 PM RENARD BEVERLY  Bilateral, Laparoscopy  vitreoretinal surgery.  ORIF - Open reduction of fracture of ankle with internal fixation (552727213778894).  Comments:  1/18/2018 10:11 AM TRISTIN - Thuy Hopkins   Social History:        Alcohol Assessment: Current            Current                      Comments:                      08/18/2010 - Jaxon OLEARY, Olivia                     rare      Tobacco Assessment: Denies Tobacco Use      Substance Abuse Assessment: Denies Substance Abuse      Home and Environment Assessment            Marital status: .        Physical Examination   Vital Signs   9/26/2019 10:02 AM CDT Temperature Tympanic 97.1 DegF  LOW    Peripheral Pulse Rate 98 bpm    Pulse Site Radial artery    Respiratory Rate 16 br/min    Systolic Blood Pressure 146 mmHg  HI    Diastolic Blood Pressure 94 mmHg  HI    Mean Arterial Pressure 111 mmHg    BP Site Right arm    Oxygen Saturation 99 %      Measurements from flowsheet : Measurements   9/26/2019 10:02 AM CDT Height Measured - Standard 68 in    Weight Measured - Standard 190 lb    BSA 2.03 m2    Body Mass Index 28.89 kg/m2  HI      General:  No acute distress.    Musculoskeletal:  right knee is swollen but not warm or inflamed, mildly tender to palpation.       Impression and Plan   Diagnosis     Bursitis of right knee ([id number] M70.51).     Course:  Incomplete response to treatment.    Summary:  knee is wrapped, continue with compression, ice prn; follow up if not improving.    Orders     Orders   Charges (Evaluation and Management):  35221 office outpatient visit 15 minutes (Charge) (Order): Quantity: 1, Bursitis of right knee.

## 2022-02-15 NOTE — TELEPHONE ENCOUNTER
---------------------  From: Vishnu Funes CMA ([place] Message Pool ([id number]_[facility]))   To: Berhane Krause PA-C;     Sent: 10/2/2019 12:51:20 PM CDT  Subject: FW: Medication Management   Due Date/Time: 10/1/2019 5:00:00 PM CDT           ---------------------  From: Jelena Iyer CMA (eRx Pool ([id number]_[facility]))   To: [place] Message Pool ([id number]_[facility]);     Sent: 10/1/2019 7:12:42 PM CDT  Subject: FW: Medication Management   Due Date/Time: 10/1/2019 5:00:00 PM CDT           Entered by Jelena Iyer CMA on October 01, 2019 7:12:33 PM CDT  PCP:   [facility]    Medication:   Tamsulosin  Last Filled:  1/15/19   Quantity:  90 Refills:  1    Date of last office visit and reason:   9/26/19 knee bursitis  Date of last labs pertaining to condition:  n/a    Note:  Please advise- no RTC    Return to Clinic order placed?  n/a    Resource:   [employer]  Phone:   [phone]       ** Patient matched by Jelena Iyer CMA on 10/1/2019 7:09:25 PM CDT **      ------------------------------------------  From: [facility]  To: Berhane Krause PA-C  Sent: September 30, 2019 5:00:03 PM CDT  Subject: Medication Management  Due: October 1, 2019 5:00:03 PM CDT    ** On Hold Pending Signature **  Drug: tamsulosin (tamsulosin 0.4 mg oral capsule)  TAKE 1 CAPSULE BY MOUTH ONCE DAILY  Quantity: 270 unknown unit Days Supply: 0         Refills: 0  Substitutions Allowed  Notes from Pharmacy:     Dispensed Drug: tamsulosin (tamsulosin 0.4 mg oral capsule)  TAKE 1 CAPSULE BY MOUTH ONCE DAILY  Quantity: 270 unknown unit Days Supply: 0         Refills: 0  Substitutions Allowed  Notes from Pharmacy:   ---------------------------------------------------------------  From: Berhane Krause PA-C   To: [facility]    Sent: 10/2/2019 1:08:15 PM CDT  Subject: FW: Medication Management     ** Submitted: **  Complete:tamsulosin (tamsulosin 0.4 mg oral capsule)   Signed by Berhane Krause PA-C  10/2/2019 1:08:00 PM    ** Approved **  tamsulosin  (TAMSULOSIN   CAP 0.4MG CAPSULE)  TAKE 1 CAPSULE BY MOUTH ONCE DAILY  Qty:  270 unknown unit        Days Supply:  0        Refills:  0          Substitutions Allowed     Route To Pharmacy - [facility]

## 2022-02-15 NOTE — CARE COORDINATION
Pt appears on [facility] chronic disease panel as out of parameters for HTN.  Pt was seen on 4/20/2018 for head injury follow up, BP was 160/108. Pt has RTC for 6/2018, also has appt on 5/11/2018.  Provide did not want to change any bp meds on 4/20/2018 pt to monitor at home and bring bp readings to appt.

## 2022-02-15 NOTE — PROGRESS NOTES
"   Patient:   PUMA BAEZ            MRN: [id number]            FIN: [id number]               Age:   82 years     Sex:  Male     :  1935   Associated Diagnoses:   Embolic cerebral infarction; History of deep venous thrombosis; Peripheral edema; HTN (hypertension); Nonrheumatic mitral valve regurgitation   Author:   Berhane Krause PA-C      Visit Information   Accompanied by:  Family member.       Chief Complaint   2018 3:53 PM CST    Pt here for weakness,dizziness and just \"sleeping like 12 hours a day\" since last visit.  Pt is still having edmea in right leg.  Pt states his left leg is now starting to hurt.      History of Present Illness   Chief complaint and symptoms noted above and confirmed with patient     2018:  He had MitraClip place  but then developed MI followed by DVT followed by embolic stroke with left hemiparesis  he spent a month at [facility]  his daughter (Alma Delia) is living with him  they are arranging [facility] to come and do home therapy  he had swelling in his right lower leg today (it has been wrapped every day but not today)  he has some constipation due to iron supplement, he is advised to start Miralax      2018:  still having right leg edema,  is seeing accupuncture for this  he has fired home PT because he does not think he needs them  has seen [facility] for [facility] edema but he stopped going there because he did not think it was effective  he is wearing a compression stocking on that leg during the day  he has stopped following up with neurology because he did not think they were doing much for him  needs CBC and INR today  needs med refills  he has seen accupuncture for his left thumb weakness and it thinks it is improving     2018:  here today with his daughter  has been to accupuncture once  right LLE is still swollen, will be starting PT at [facility] soon  had follow up with neurology , no changes made  has appt 3/13 with interventional " radiology to discuss removal of IVC filter      Review of Systems   Constitutional:  Fatigue.    Respiratory:  Negative.    Cardiovascular:  Peripheral edema, No chest pain.    Genitourinary:  Negative.       Health Status   Allergies:    Allergic Reactions (Selected)  Severity Not Documented  Narcotics (Severe constipation)  Vicodin (Urinary retention)   Medications:  (Selected)   Prescriptions  Prescribed  FLUoxetine 20 mg oral capsule: 1 cap(s) ( 20 mg ), po, daily, # 90 cap(s), 3 Refill(s), Type: Maintenance, Pharmacy: [id number], 1 cap(s) po daily  aspirin 81 mg oral tablet: See Instructions, Instructions: 1 tab(s) po every third day, # 100 tab(s), 0 Refill(s), Type: Maintenance, OTC (Rx)  atorvastatin 40 mg oral tablet: 1 tab(s) ( 40 mg ), PO, Daily, # 90 tab(s), 3 Refill(s), Type: Maintenance, Pharmacy: [id number], 1 tab(s) po daily  famotidine 20 mg oral tablet: 1 tab(s) ( 20 mg ), po, daily, # 90 tab(s), 3 Refill(s), Type: Maintenance, Pharmacy: [id number], 1 tab(s) po daily  ferrous gluconate 324 mg (37.5 mg elemental iron) oral tablet: 1 tab(s) ( 324 mg ), po, daily, # 90 tab(s), 3 Refill(s), Type: Maintenance, Pharmacy: [id number], 1 tab(s) po daily  finasteride 5 mg oral tablet: 1 tab(s) ( 5 mg ), PO, Daily, # 90 tab(s), 3 Refill(s), Type: Maintenance, Pharmacy: [id number], 1 tab(s) po daily  furosemide 40 mg oral tablet: 1 tab(s) ( 40 mg ), PO, bid, # 60 tab(s), 1 Refill(s), Type: Maintenance, Pharmacy: [id number], 1 tab(s) po bid,x30 day(s)  gabapentin 300 mg oral capsule: 1 cap(s) ( 300 mg ), po, qhs, # 90 cap(s), 3 Refill(s), Type: Maintenance, Pharmacy: [id number], 1 cap(s) po qhs  losartan 25 mg oral tablet: 1 tab(s) ( 25 mg ), PO, Daily, # 90 tab(s), 3 Refill(s), Type: Maintenance, Pharmacy: [id number], 1 tab(s) po daily  metoprolol succinate 100 mg oral tablet, extended release: 1 tab(s) ( 100 mg ), PO,  Daily, # 90 tab(s), 3 Refill(s), Type: Maintenance, Pharmacy: [id number], 1 tab(s) po daily  tamsulosin 0.4 mg oral capsule: 1 cap(s) ( 0.4 mg ), po, daily, # 90 cap(s), 3 Refill(s), Type: Maintenance, Pharmacy: [id number], 1 cap(s) po daily  warfarin 3 mg oral tablet: 1 tab(s) ( 3 mg ), PO, Daily, # 90 tab(s), 1 Refill(s), Type: Maintenance, Pharmacy: [id number], 1 tab(s) po daily  Documented Medications  Documented  Fish Oil: ( 1,000 mg ), po, cap(s), 0 Refill(s), Type: Maintenance  acetaminophen 325 mg oral capsule: 1 cap(s) ( 325 mg ), po, tid, 0 Refill(s), Type: Maintenance  amLODIPine 5 mg oral tablet: 1 tab(s) ( 5 mg ), po, bid, 0 Refill(s), Type: Maintenance  clotrimazole 1% topical cream: 1 annmarie, top, bid, 0 Refill(s), Type: Maintenance  melatonin 3 mg oral tablet: 1 tab(s) ( 3 mg ), po, daily, 0 Refill(s), Type: Maintenance  polyethylene glycol 3350 oral powder for reconstitution: ( 17 gm ), po, daily, 0 Refill(s), Type: Maintenance  Suspended  potassium chloride: ( 10 mEq ), daily, 0 Refill(s), Type: Maintenance   Problem list:    All Problems (Selected)  Embolic cerebral infarction / SNOMED CT 9293228029 / Confirmed  HTN (hypertension) / SNOMED CT 3383570332 / Confirmed  Bilateral inguinal hernia / SNOMED CT 138075807 / Confirmed  Hematoma / SNOMED CT 9310680933 / Confirmed  Abnormal glucose / SNOMED CT 791374136 / Confirmed  Latent tuberculosis / SNOMED CT 54045478 / Confirmed  Anemia, blood loss / SNOMED CT 6557823472 / Confirmed  Hernia, ventral / SNOMED CT 3490449105 / Confirmed  Obesity / SNOMED CT 0660484407 / Probable  History of deep venous thrombosis / SNOMED CT 1756744752 / Confirmed  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 / SNOMED CT 9791234723 / Confirmed  Insomnia / SNOMED CT 985095850 / Confirmed  History of MI (myocardial infarction) / SNOMED CT 5763333488 / Confirmed  Nonrheumatic mitral valve regurgitation / SNOMED CT 630556553 /  "Confirmed  Esophageal reflux / SNOMED CT 416753022 / Confirmed  Lumbar spondylosis / SNOMED CT 147773108 / Confirmed  Epistaxis / SNOMED CT 149262831 / Confirmed  Hyperplasia of prostate / SNOMED CT 106356897 / Confirmed  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 75047555 / Confirmed  Acute posthemorrhagic anemia / SNOMED CT 865970967 / Confirmed  Pain of right thumb / SNOMED CT 143463146 / Confirmed  Barretts esophagus / SNOMED CT 085450736 / Confirmed  Chronic alcohol use / SNOMED CT 850112809 / Confirmed  Hematuria / SNOMED CT 162211924 / Confirmed  Foot pain / SNOMED CT 039381795 / Confirmed  CHF (congestive heart failure) / SNOMED CT 41383533 / Confirmed  CAD (coronary artery disease) / SNOMED CT 61675248 / Confirmed  Hyperlipidemia / SNOMED CT 37698464 / Confirmed  Tuberculosis / SNOMED CT 64270097 / Confirmed      Histories   Past Medical History:    Active  Hematoma (6330436190): Onset on 12/15/2017 at 82 years.  Embolic cerebral infarction (2503666884): Onset on 12/10/2017 at 82 years.  CHF (congestive heart failure) (69348583): Onset in the month of 11/2008 at 73 years  History of MI (myocardial infarction) (9487835292): Onset in the month of 11/2007 at 72 years  Barretts esophagus (770725873): Onset in the month of 1/2003 at 67 years  Traumatic blindness of left eye (50023348): Onset in 1999 at 64 years.  Comments:  8/18/2010 CDT 2:58 PM CDT - Olivia Coates CMA  \"ruptured blood vessel\"  HTN (hypertension) (8476661268): Onset in 1989 at 54 years.  Latent tuberculosis (80145108)  CAD (coronary artery disease) (14836472)  Acute posthemorrhagic anemia (415019502)  Hyperlipidemia (53558131)  Epistaxis (422651284)  Comments:  1/18/2018 CST 10:33 AM CST - Thuy Hopkins  Chronic  Abnormal glucose (623677683)  Foot pain (437989793)  Nonrheumatic mitral valve regurgitation (396480541)  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 (2340674117)  Hematuria (944811096)  History of deep venous thrombosis " (2014840980)  Comments:  2018 CST 9:57 AM Thuy Topete  Right lower extremity.  Iliac vein.  Anemia, blood loss (1151419327)  Tuberculosis (37319758)  Comments:  2018 CST 10:02 AM Thuy Topete  Patient tests positive.  See family history.  Bilateral inguinal hernia (669654895)  Hernia, ventral (4968319820)  Hyperplasia of prostate (432032433)  Comments:  2018 CST 10:24 AM Thuy Topete  Benign  Insomnia (445641941)  Pain of right thumb (640476480)  Comments:  2018 CST 10:36 AM Thuy Topete  Chronic  Chronic alcohol use (426310540)  Esophageal reflux (728681686)  Lumbar spondylosis (796501283)  Resolved  Inpatient stay (698472513): Onset on 2017 at 82 years.  Resolved on 2018 at 82 years.  Comments:  2018 CST 1:09 PM Blossom Art  @[facility], [place], [place] - Embolic cerebral infarction    2018 CST 1:14 PM Blossom Art  [facility] not W  H/O herpes zoster (7371501692): Onset on 2009 at 73 years.  Resolved.  Age-related cataract of both eyes (652994282):  Resolved.  Acute kidney injury (52828471):  Resolved.  History of fracture of right ankle (3583305323):  Resolved.  Comments:  2018 CST 10:06 AM Thuy Topete  Right  Burst blood vessel in eye (730824550):  Resolved.  Comments:  2018 CST 10:05 AM Thuy Topete  Left  H/O fracture of wrist (7222432063):  Resolved.  Comments:  2018 CST 10:08 AM Thuy Topete  Right   Family History:    Cancer  Brother ()  Comments:  2010 3:29 PM - Jaxon CMA, Olivia  Lung  Brother  Comments:  2010 3:29 PM - Jaxon CMA, Olivia  Oral  Brother ()  Comments:  2010 3:29 PM - Jaxon CMA, Olivia  colon  Brother ()  Leukemia  Sister (Adrianna, )  Lymphoma  Sister  Rheumatoid arthritis  Brother  TB - Tuberculosis  Father ()  Mother ()  HTN - Hypertension  Brother ()  Sister (Adrianna,  )  Sister  Brother ()     Procedure history:    History of inferior vena cava filter placement (8337188107) on 12/10/2017 at 82 Years.  Mitral valve clip (3188655776) on 2017 at 82 Years.  Comments:  2018 1:16 PM - Blossom Varela  Complicated by air embolization and intermedius vein graft  PCI - Percutaneous coronary intervention (7393304889) on 2017 at 82 Years.  Comments:  2018 1:18 PM - Blossom Varela  via left femoral artery with coronary angiogram.  Intraaortic balloon pump placed and later removed.  Coronary angiogram (01444099) on 2017 at 81 Years.  Extracapsular cataract extraction and insertion of intraocular lens (649733162) on 2017 at 81 Years.  Comments:  2017 12:37 PM - Dalila Rondon  Right.  Extracapsular cataract extraction and insertion of intraocular lens (364941100) on 2017 at 81 Years.  Comments:  2017 1:44 PM - Dalila Rondon  Left.  Coronary angiogram (93639330) on 3/6/2017 at 81 Years.  Repair of recurrent ventral hernia w/mesh (064214121) on 2012 at 76 Years.  Repair of epigastric hernia (793165169) on 3/15/2012 at 76 Years.  right shoulder rotator cuff repair in the month of 2010 at 74 Years.  Comments:  2010 3:06 PM - RENARD GORMAN  Colonoscopy (817919787) in the month of 3/2009 at 73 Years.  Comments:  2010 3:02 PM - RENARD GORMAN  Normal  CABG x 2 - Coronary artery bypass grafts x 2 (742272103) in the month of 2007 at 72 Years.  laser for photocoagulation in the month of 3/2007 at 71 Years.  EGD in the month of 2003 at 67 Years.  Flexible sigmoidoscopy (23604857) on 10/12/2000 at 65 Years.  Flexible sigmoidoscopy (51596577) on 10/19/1993 at 58 Years.  right shoulder decompression in  at 54 Years.  Wrist Fracture in  at 52 Years.  Comments:  2010 3:07 PM - RENARD GORMAN  Compression fracutre of foot in  at 43 Years.  L ankle compression in  at 40 Years.  Bilateral vasectomy  (016760208) in 1975 at 40 Years.  Back (794069323) in 1975 at 40 Years.  Comments:  1/18/2018 10:26 AM - Thuy Hopkins  Lumbar and thoracic.    1/18/2018 10:23 AM - Thuy Hopkins  History of spine surgery.  Rotator cuff repair (784363990) in 1964 at 29 Years.  Comments:  2/24/2012 10:25 AM - Berhane Krause PA-C.  left shoulder  Hernia repair (98364946).  Comments:  8/18/2010 3:05 PM - RENARD GORMAN  Bilateral, Laparoscopy  vitreoretinal surgery.  ORIF - Open reduction of fracture of ankle with internal fixation ([id number]).  Comments:  1/18/2018 10:11 AM - Thuy Hopkins  Right   Social History:        Alcohol Assessment: Current            Current                     Comments:                      08/18/2010 - Renard Coates CMA                     rare      Tobacco Assessment: Denies Tobacco Use      Substance Abuse Assessment: Denies Substance Abuse      Home and Environment Assessment            Marital status: .        Physical Examination   Vital Signs   2/28/2018 3:53 PM CST Temperature Tympanic 98 DegF    Peripheral Pulse Rate 60 bpm    Pulse Site Radial artery    Respiratory Rate 16 br/min    Systolic Blood Pressure 134 mmHg  HI    Diastolic Blood Pressure 68 mmHg    Mean Arterial Pressure 90 mmHg    BP Site Right arm    Oxygen Saturation 97 %      Measurements from flowsheet : Measurements   2/28/2018 3:53 PM CST Height Measured - Standard 68 in    Weight Measured - Standard 198 lb    BSA 2.07 m2    Body Mass Index 30.1 kg/m2  HI      General:  No acute distress.    HENT:  Tympanic membranes are clear, No pharyngeal erythema, No sinus tenderness.    Neck:  Supple, Non-tender, No lymphadenopathy.    Respiratory:  Lungs are clear to auscultation.    Cardiovascular:  Normal rate, Regular rhythm, right lower leg edema and swelling, grade 3/6 high pitched holosystolic murmur.    Gastrointestinal:  Soft, Non-tender, Non-distended.       Impression and Plan   Diagnosis     Embolic cerebral infarction  (WEV77-DY I63.40).     History of deep venous thrombosis (YXK93-CV Z86.718).     Peripheral edema (AHP57-SV R60.9).     HTN (hypertension) (FYU49-DN I10).     Nonrheumatic mitral valve regurgitation (RGE43-IC I34.0).     Plan:  will decrease his metoprolol to 50 mg qd (this is what he is taking now, he did not increase this dose last time), stay on furosemide 40 mg bid, will stop amlopdipine (which may be contributing to his edema)  recheck in a month  start PT at [facility].    Orders     Orders   Charges (Evaluation and Management):  61409 office outpatient visit 25 minutes (Charge) (Order): Quantity: 1, Embolic cerebral infarction  HTN (hypertension)  Nonrheumatic mitral valve regurgitation  History of deep venous thrombosis  Peripheral edema.     Orders   Requests (Return to Office):  Return to Office (Request) (Order): Return in 1 month  Return to Office (Request) (Discontinue).

## 2022-02-15 NOTE — PROGRESS NOTES
"   Patient:   PUMA BAEZ            MRN: [id number]            FIN: [id number]               Age:   82 years     Sex:  Male     :  1935   Associated Diagnoses:   Embolic cerebral infarction; HTN (hypertension); Scalp laceration   Author:   Berhane Krause PA-C      Chief Complaint   2018 10:15 AM CDT   Pt here for Fall he had this morning.  Pt states he \"tripped over the blade on his tractor\"  and hit back of head and wound is still bleeding.  Pt states \"they are trying to change my pills up\"  and states he is unsure of what he is taking right now.        History of Present Illness   Chief complaint and symptoms noted above and confirmed with patient      2018:  He had MitraClip place  but then developed MI followed by DVT followed by embolic stroke with left hemiparesis  he spent a month at [facility]  his daughter (Alma Delia) is living with him  they are arranging [facility] to come and do home therapy  he had swelling in his right lower leg today (it has been wrapped every day but not today)  he has some constipation due to iron supplement, he is advised to start Miralax      2018:  still having right leg edema,  is seeing accupuncture for this  he has fired home PT because he does not think he needs them  has seen [facility] but he stopped going there because he did not think it was effective  he is wearing a compression stocking on that leg during the day  he has stopped following up with neurology because he did not think they were doing much for him  needs CBC and INR today  needs med refills  he has seen accupuncture for his left thumb weakness and it thinks it is improving     2018:  here today with his daughter  has been to accupuncture once  right LLE is still swollen, will be starting PT at [facility] soon  had follow up with neurology , no changes made  has appt 3/13 with interventional radiology to discuss removal of IVC filter        2018: "  at last visit  3/29 he was having some bleeding gums and blood in his stool, his warfarin was stopped and   he will be maintained on just 162 mg aspirin  he says that [facility] ([facility] or [facility]) are changing/adjusting his meds (no records are available)  his son-in-law is a pharmacist and has recommended that he stop his gabapentin, famotidine, atorvastatin or melatonin      this morning he tripped over the blade of his tractor and fell and hit his head causing a scalp laceration,  no LOC  a few days ago he was feeling SOB but not today, he had some weakness in his left side last week but that is better today  has completed PT but is still going to speech therapy         Review of Systems   Constitutional:  Negative.    Ear/Nose/Mouth/Throat:  Negative.    Respiratory:  Negative.    Cardiovascular:  Negative.       Health Status   Allergies:    Allergic Reactions (Selected)  Severity Not Documented  Narcotics (Severe constipation)  Vicodin (Urinary retention)   Medications:  (Selected)   Prescriptions  Prescribed  FLUoxetine 20 mg oral capsule: 1 cap(s) ( 20 mg ), po, daily, # 90 cap(s), 3 Refill(s), Type: Maintenance  aspirin 81 mg oral tablet: 2 tab(s) ( 162 mg ), po, daily, # 100 tab(s), 0 Refill(s), Type: Maintenance, OTC (Rx)  ferrous gluconate 324 mg (37.5 mg elemental iron) oral tablet: 1 tab(s) ( 324 mg ), po, daily, # 90 tab(s), 3 Refill(s), Type: Maintenance  finasteride 5 mg oral tablet: 1 tab(s) ( 5 mg ), PO, Daily, # 90 tab(s), 3 Refill(s), Type: Maintenance  furosemide 40 mg oral tablet: 1 tab(s) ( 40 mg ), PO, bid, # 60 tab(s), 1 Refill(s), Type: Maintenance  losartan 25 mg oral tablet: 1 tab(s) ( 25 mg ), PO, Daily, # 90 tab(s), 3 Refill(s), Type: Maintenance  metoprolol succinate 50 mg oral tablet, extended release: 1 tab(s) ( 50 mg ), PO, Daily, # 90 tab(s), 3 Refill(s), Type: Maintenance  tamsulosin 0.4 mg oral capsule: 1 cap(s) ( 0.4 mg ), po, daily, # 90 cap(s), 3 Refill(s), Type:  Maintenance  Documented Medications  Documented  Fish Oil: ( 1,000 mg ), po, cap(s), 0 Refill(s), Type: Maintenance  acetaminophen 325 mg oral capsule: 1 cap(s) ( 325 mg ), po, tid, 0 Refill(s), Type: Maintenance  clotrimazole 1% topical cream: 1 annmarie, top, bid, 0 Refill(s), Type: Maintenance  polyethylene glycol 3350 oral powder for reconstitution: ( 17 gm ), po, daily, 0 Refill(s), Type: Maintenance  Suspended  potassium chloride: ( 10 mEq ), daily, 0 Refill(s), Type: Maintenance   Problem list:    All Problems (Selected)  Embolic cerebral infarction / SNOMED CT 6541579382 / Confirmed  HTN (hypertension) / SNOMED CT 8661918683 / Confirmed  Bilateral inguinal hernia / SNOMED CT 228388539 / Confirmed  Hematoma / SNOMED CT 8786040886 / Confirmed  Abnormal glucose / SNOMED CT 930422106 / Confirmed  Latent tuberculosis / SNOMED CT 91728331 / Confirmed  Anemia, blood loss / SNOMED CT 6111421348 / Confirmed  Hernia, ventral / SNOMED CT 9512282736 / Confirmed  Obesity / SNOMED CT 1258014527 / Probable  History of deep venous thrombosis / SNOMED CT 0704787959 / Confirmed  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 / SNOMED CT 8981371179 / Confirmed  S/P mitral valve clip implantation / SNOMED CT 3912125739 / Confirmed  S/P mitral valve clip implantation / SNOMED CT 431935 / Confirmed  Insomnia / SNOMED CT 414107266 / Confirmed  History of MI (myocardial infarction) / SNOMED CT 9891007912 / Confirmed  Nonrheumatic mitral valve regurgitation / SNOMED CT 633171690 / Confirmed  Esophageal reflux / SNOMED CT 879279637 / Confirmed  Lumbar spondylosis / SNOMED CT 326481920 / Confirmed  Epistaxis / SNOMED CT 951183862 / Confirmed  Hyperplasia of prostate / SNOMED CT 440311428 / Confirmed  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 20268216 / Confirmed  Urinary retention / SNOMED CT 134763585 / Confirmed  Acute posthemorrhagic anemia / SNOMED CT 177927781 / Confirmed  Left hemiparesis / SNOMED CT 256563759 /  "Confirmed  Pain of right thumb / SNOMED CT 377452579 / Confirmed  Barretts esophagus / SNOMED CT 587321246 / Confirmed  Chronic alcohol use / SNOMED CT 496444088 / Confirmed  Hematuria / SNOMED CT 514166685 / Confirmed  Foot pain / SNOMED CT 633380674 / Confirmed  CHF (congestive heart failure) / SNOMED CT 86895652 / Confirmed  CAD (coronary artery disease) / SNOMED CT 96193003 / Confirmed  Hyperlipidemia / SNOMED CT 27882010 / Confirmed  Tuberculosis / SNOMED CT 16506764 / Confirmed      Histories   Past Medical History:    Active  Hematoma (0520596178): Onset on 12/15/2017 at 82 years.  Embolic cerebral infarction (4543812706): Onset on 12/10/2017 at 82 years.  CHF (congestive heart failure) (30860656): Onset in the month of 11/2008 at 73 years  History of MI (myocardial infarction) (7654202208): Onset in the month of 11/2007 at 72 years  Barretts esophagus (008537290): Onset in the month of 1/2003 at 67 years  Traumatic blindness of left eye (50856371): Onset in 1999 at 64 years.  Comments:  8/18/2010 CDT 2:58 PM CDT - Olivia Coates CMA  \"ruptured blood vessel\"  HTN (hypertension) (8406874494): Onset in 1989 at 54 years.  Latent tuberculosis (42465549)  CAD (coronary artery disease) (14012716)  Acute posthemorrhagic anemia (055188435)  Hyperlipidemia (79685117)  Epistaxis (798780524)  Comments:  1/18/2018 CST 10:33 AM Thuy Topete  Chronic  Abnormal glucose (616776585)  Foot pain (190392406)  Nonrheumatic mitral valve regurgitation (945261835)  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 (4124977337)  Hematuria (796134268)  History of deep venous thrombosis (9654080555)  Comments:  1/18/2018 CST 9:57 AM Thuy Topete  Right lower extremity.  Iliac vein.  Anemia, blood loss (0077718755)  Tuberculosis (67498729)  Comments:  1/18/2018 CST 10:02 AM CST - Hopkins , Thuy  Patient tests positive.  See family history.  Bilateral inguinal hernia (399208185)  Hernia, ventral (6142198952)  Hyperplasia of " prostate (960062265)  Comments:  2018 CST 10:24 AM Thuy Topete  Benign  Insomnia (396329009)  Pain of right thumb (874330352)  Comments:  2018 CST 10:36 AM Thuy Topete  Chronic  Chronic alcohol use (203031742)  Esophageal reflux (999628257)  Lumbar spondylosis (775113040)  Resolved  Inpatient stay (275040789): Onset on 2017 at 82 years.  Resolved on 2018 at 82 years.  Comments:  2018 CST 1:09 PM Blossom Art  @[facility], [place], [place] - Embolic cerebral infarction    2018 CST 1:14 PM [facility]  H/O herpes zoster (4504264312): Onset on 2009 at 73 years.  Resolved.  Age-related cataract of both eyes (203068650):  Resolved.  Acute kidney injury (43829484):  Resolved.  History of fracture of right ankle (2346916874):  Resolved.  Comments:  2018 CST 10:06 AM Thuy Topete  Right  Burst blood vessel in eye (811623805):  Resolved.  Comments:  2018 CST 10:05 AM Thuy Topete  Left  H/O fracture of wrist (2614352286):  Resolved.  Comments:  2018 CST 10:08 AM Thuy Topete  Right   Family History:    Cancer  Brother ()  Comments:  2010 3:29 PM - Olivia Coates CMA  Lung  Brother  Comments:  2010 3:29 PM - Olivia Coates CMA  Oral  Brother ()  Comments:  2010 3:29 PM - Olivia Coates CMA  colon  Brother ()  Leukemia  Sister (Adrianna, )  Lymphoma  Sister  Rheumatoid arthritis  Brother  TB - Tuberculosis  Father ()  Mother ()  HTN - Hypertension  Brother ()  Sister (Adrianna, )  Sister  Brother ()     Procedure history:    History of inferior vena cava filter placement (1381629666) on 12/10/2017 at 82 Years.  Mitral valve clip (5304231711) on 2017 at 82 Years.  Comments:  2018 1:16 PM - Blossom Varela  Complicated by air embolization and intermedius vein graft  PCI - Percutaneous coronary  intervention (4698892784) on 12/7/2017 at 82 Years.  Comments:  1/31/2018 1:18 PM - Blossom Varela  via left femoral artery with coronary angiogram.  Intraaortic balloon pump placed and later removed.  Coronary angiogram (92780141) on 9/1/2017 at 81 Years.  Extracapsular cataract extraction and insertion of intraocular lens (495298593) on 7/6/2017 at 81 Years.  Comments:  7/25/2017 12:37 PM - Dalila Ronodn  Right.  Extracapsular cataract extraction and insertion of intraocular lens (998165602) on 6/29/2017 at 81 Years.  Comments:  8/30/2017 1:44 PM - Dalila Rondon  Left.  Coronary angiogram (89106638) on 3/6/2017 at 81 Years.  Repair of recurrent ventral hernia w/mesh (692097547) on 8/21/2012 at 76 Years.  Repair of epigastric hernia (237029883) on 3/15/2012 at 76 Years.  right shoulder rotator cuff repair in the month of 8/2010 at 74 Years.  Comments:  8/18/2010 3:06 PM - RENARD GORMAN  Colonoscopy (055972386) in the month of 3/2009 at 73 Years.  Comments:  8/18/2010 3:02 PM - RENARD GORMAN  CABG x 2 - Coronary artery bypass grafts x 2 (714751106) in the month of 11/2007 at 72 Years.  laser for photocoagulation in the month of 3/2007 at 71 Years.  EGD in the month of 1/2003 at 67 Years.  Flexible sigmoidoscopy (66882702) on 10/12/2000 at 65 Years.  Flexible sigmoidoscopy (96035679) on 10/19/1993 at 58 Years.  right shoulder decompression in 1989 at 54 Years.  Wrist Fracture in 1987 at 52 Years.  Comments:  8/18/2010 3:07 PM - RENARD GORMAN  Compression fracutre of foot in 1978 at 43 Years.  L ankle compression in 1975 at 40 Years.  Bilateral vasectomy (080439414) in 1975 at 40 Years.  Back (696304399) in 1975 at 40 Years.  Comments:  1/18/2018 10:26 AM - Thuy Hopkins  Lumbar and thoracic.    1/18/2018 10:23 AM - Thuy Hopkins  History of spine surgery.  Rotator cuff repair (447275103) in 1964 at 29 Years.  Comments:  2/24/2012 10:25 AM - Jimi MG, Berhane DUGAN.  left shoulder  Hernia repair  (26899973).  Comments:  8/18/2010 3:05 PM - RENARD GORMAN  Bilateral, Laparoscopy  vitreoretinal surgery.  ORIF - Open reduction of fracture of ankle with internal fixation ([id number]).  Comments:  1/18/2018 10:11 AM - Thuy Hopkins   Social History:        Alcohol Assessment: Current            Current                     Comments:                      08/18/2010 - Renard Coates CMA                     rare      Tobacco Assessment: Denies Tobacco Use      Substance Abuse Assessment: Denies Substance Abuse      Home and Environment Assessment            Marital status: .        Physical Examination   Vital Signs   4/16/2018 10:15 AM CDT Temperature Tympanic 97.9 DegF    Peripheral Pulse Rate 64 bpm    Pulse Site Radial artery    Respiratory Rate 16 br/min    Systolic Blood Pressure 182 mmHg  HI    Diastolic Blood Pressure 108 mmHg  HI    Mean Arterial Pressure 133 mmHg    BP Site Right arm    BP Systolic Repeat 172 mmHg    BP Diastolic Repeat 108 mmHg    BP Site Repeat Right arm    Oxygen Saturation 97 %      Measurements from flowsheet : Measurements   4/16/2018 10:15 AM CDT Height Measured - Standard 68 in    Weight Measured - Standard 188 lb    BSA 2.02 m2    Body Mass Index 28.58 kg/m2  HI      General:  No acute distress.    Eye:  Pupils are equal, round and reactive to light, Extraocular movements are intact.    HENT:  Tympanic membranes are clear, No pharyngeal erythema, No sinus tenderness.    Respiratory:  Lungs are clear to auscultation.    Cardiovascular:  Normal rate, Regular rhythm.    Gastrointestinal:  Soft, Non-tender, Non-distended, Normal bowel sounds, No organomegaly.    Musculoskeletal:  Normal range of motion.    Integumentary:  3 cm slightly gaping laceration to back of head, some seepage of blood,    wound is cleaned and closed with 5 staples, bleeding is controlled.       Impression and Plan   Diagnosis     Embolic cerebral infarction (FXR96-RR I63.40).     HTN  (hypertension) (TEZ66-PG I10).     Scalp laceration (RFT39-ZJ S01.01XA).     Summary:  return in 5 days for staple removal  he has stopped several meds due to side effects and is feeling better off of those meds  blood pressure is not controlled, will continue to monitor but will not make changes to his meds at this time.    Orders     Orders   Charges (Evaluation and Management):  73064 office outpatient visit 15 minutes (Charge) (Order): Quantity: 1, Scalp laceration  HTN (hypertension)  Embolic cerebral infarction  Charges:  [id number] unlisted px skin muc membrane +subq tissue (Charge) (Order): Quantity: 1, Scalp laceration  HTN (hypertension)  Embolic cerebral infarction.

## 2022-02-15 NOTE — TELEPHONE ENCOUNTER
---------------------  From: Berhane Krause PA-C   To: Kelsy Glez , Cat;     Sent: 3/19/2020 2:10:17 PM CDT  Subject: RE: MTM initial visit     Cat,    Thanks  Here are my thoughts.  1.  Okay to stop the tamsulosin    2.  Okay to switch to rosuvastatin but if he doesn't tolerate it, I am thinking of discontinuing any statin at this point    3. Okay for nitro SL tabs    4.  Okay to stop the fluoxetine with no replacement    5.  I disagree with Xarelto, if he is somewhat stable on warfarin I would like him stay on that.  He has bled on Xarelto in the past    Would you like to communicate these changes or would you like me to do it?        ---------------------  From: Kelsy Glez Kaity   To: Berhane Krause PA-C;     Sent: 3/19/2020 2:02:11 PM CDT  Subject: MTM initial visit     Itz Acosta -    I spoke with [facility] and his daughters this week. We're only doing telemedicine MTM at this point.    Here are a few ideas for consideration:  1. Consider stopping tamsulosin. I did some digging and it looks like this (and finasteride) were started upon hospital discharge back in 2018, pt was supposed to follow-up outpt with urology (I don't see any notes for this - only circumcision). Pt states he's never had prostate issues. He does have urinary frequency and urgency - which is likely from bumex. Pt would like to trial off medication.   It's possible this is contributing to some orthostasis.  I'm okay with giving it a try off the medication and monitor for symptom changes.  Reasonable consider his pill burden and age.    2. Consider switching from atorvastatin 80 to rosuvastatin 40 mg (both high intensity statin).  Pt thinks having some myalgias - reasonable to switch agents. Pt and family really want to stop altogether - I discouraged this due to significant cardiac history.  Last lipid panel was in 2007. Recommend repeat lipid panel - if you're okay with this, homecare nursing might be able to draw next week when  "completing home care visit (this would keep him out of clinic).  Any idea how to arrange this lab with them?    3. He does not have nitroglycerin SL tabs at home.  You okay with us prescribing?    4. Consider stopping fluoxetine - pt says was started in 2018 at hosp discharge. Feels that it gives him \"electrical\" feeling.  I'm planning to do a geriatric depression scale screening tomorrow (pt fell asleep during last phone visit) and then considering stopping fluoxetine if no concerning symptoms.     5. We also talked about the potential of xarelto again (I'm really not sure whether cards would be okay with this d/t his mitral clip?), but worth an ask to decrease all the monitoring and potentially improve QOL.  Sounds like this was stopped due to high cost in the past, but pt may be candidate for patient assistance program. Any thoughts on this?    Thanks for the referral -please see attached note for details.  KB---------------------  From: Kelsy Glez Kaity   To: Berhane Krause PA-C;     Sent: 3/19/2020 2:43:21 PM CDT  Subject: RE: MTM initial visit     Sounds great to me.   I will communicate the changes - have a scheduled phone call tomorrow.  Would you like lipid panel? (last was in 2007)  Any ideas how to collaborate with homecare?  I am definitely okay without the xarelto! thanks for your collaboration    KB---------------------  From: Berhane Krause PA-C   To: Kelsy Glez Kaity;     Cc: Olivia Coates CMA;      Sent: 3/19/2020 2:49:30 PM CDT  Subject: RE: MTM initial visit     Thanks!  Yes, a lipid panel would be nice.  Olivia Coates is a great resource for coordinating home care, etc and maybe  she can help.  I will cc her on this message.    Olivia:  can you help us arrange home care to get some blood drawn from [facility]?  Thanks,  Viry for [facility] @ [facility] to discuss.Spoke to [facility]-order faxed and she will draw Lipid Panel next week.---------------------  From: Olivia Coates CMA   To: Kelsy Glez " Cat VIEIRA; Berhane Krause PA-C;     Sent: 3/19/2020 3:40:28 PM CDT  Subject: RE: MTM initial visit---------------------  From: Berhane Krause PA-C   To: Oilvia Coates CMA;     Sent: 3/19/2020 3:42:08 PM CDT  Subject: RE: MTM initial visit     Olivia, you are the best!  Thanks for your help (once again).---------------------  From: Newton, Cat Shepard   To: Berhane Krause PA-C; Olivia Coates CMA;     Sent: 3/19/2020 3:54:02 PM CDT  Subject: RE: MTM initial visit     Awesome teamwork everyone - thanks!  I'll talk with the patient and family tomrrow and update them.  We have a phone call scheduled.  DREW

## 2022-02-15 NOTE — PROGRESS NOTES
Patient:   PUMA BAEZ            MRN: [id number]            FIN: [id number]               Age:   82 years     Sex:  Male     :  1935   Associated Diagnoses:   Anemia, blood loss; Embolic cerebral infarction; History of deep venous thrombosis; HTN (hypertension); Peripheral edema   Author:   Berhane Krause PA-C      Visit Information   Accompanied by:  No one.       Chief Complaint   2018 9:21 AM CST    Pt here for FU on his CVA and swelling in lower right leg.  Pt states edema is not getting much better.  Pt states he is seeing accupuncture for his right leg and his left thumb.  Pt also needing all his medications refills.      History of Present Illness   2018:  He had [facility] place  but then developed MI followed by DVT followed by embolic stroke with left hemiparesis  he spent a month at [facility]  his daughter (Alma Delia) is living with him  they are arranging [facility] to come and do home therapy  he had swelling in his right lower leg today (it has been wrapped every day but not today)  he has some constipation due to iron supplement, he is advised to start Miralax      2018:  still having right leg edema,  is seeing accupuncture for this  he has fired home PT because he does not think he needs them  has seen [facility] but he stopped going there because he did not think it was effective  he is wearing a compression stocking on that leg during the day  he has stopped following up with neurology because he did not think they were doing much for him  needs CBC and INR today  needs med refills  he has seen accupuncture for his left thumb weakness and it thinks it is improving      Review of Systems   Constitutional:  Negative.    Ear/Nose/Mouth/Throat:  Negative.    Respiratory:  Negative.    Cardiovascular:  Peripheral edema.    Gastrointestinal:  Negative.       Health Status   Allergies:    Allergic Reactions (Selected)  Severity Not Documented  Narcotics  (Severe constipation)  Vicodin (Urinary retention)   Medications:  (Selected)   Prescriptions  Prescribed  aspirin 81 mg oral tablet: See Instructions, Instructions: 1 tab(s) po every third day, # 100 tab(s), 0 Refill(s), Type: Maintenance, OTC (Rx)  warfarin 3 mg oral tablet: 1 tab(s) ( 3 mg ), PO, Daily, # 90 tab(s), 1 Refill(s), Type: Maintenance, Pharmacy: [id number], 1 tab(s) po daily  Documented Medications  Documented  FLUoxetine 20 mg oral capsule: 1 cap(s) ( 20 mg ), po, daily, 0 Refill(s), Type: Maintenance  Fish Oil: ( 1,000 mg ), po, cap(s), 0 Refill(s), Type: Maintenance  acetaminophen 325 mg oral capsule: 1 cap(s) ( 325 mg ), po, tid, 0 Refill(s), Type: Maintenance  amLODIPine 5 mg oral tablet: 1 tab(s) ( 5 mg ), po, bid, 0 Refill(s), Type: Maintenance  atorvastatin 40 mg oral tablet: 1 tab(s) ( 40 mg ), PO, Daily, # 30 tab(s), 0 Refill(s), Type: Maintenance  clotrimazole 1% topical cream: 1 annmarie, top, bid, 0 Refill(s), Type: Maintenance  famotidine 20 mg oral tablet: 1 tab(s) ( 20 mg ), po, daily, 0 Refill(s), Type: Maintenance  ferrous gluconate 324 mg (37.5 mg elemental iron) oral tablet: 1 tab(s) ( 324 mg ), po, daily, 0 Refill(s), Type: Maintenance  finasteride 5 mg oral tablet: 1 tab(s) ( 5 mg ), PO, Daily, # 30 tab(s), 0 Refill(s), Type: Maintenance  furosemide 20 mg oral tablet: 1 tab(s) ( 20 mg ), po, bid, 0 Refill(s)  gabapentin 300 mg oral capsule: 1 cap(s) ( 300 mg ), po, qhs, 0 Refill(s), Type: Maintenance  losartan 25 mg oral tablet: 1 tab(s) ( 25 mg ), PO, Daily, # 30 tab(s), 0 Refill(s), Type: Maintenance  melatonin 3 mg oral tablet: 1 tab(s) ( 3 mg ), po, daily, 0 Refill(s), Type: Maintenance  metoprolol succinate 50 mg oral tablet, extended release: 1 tab(s) ( 50 mg ), PO, Daily, # 30 tab(s), 0 Refill(s), Type: Maintenance  polyethylene glycol 3350 oral powder for reconstitution: ( 17 gm ), po, daily, 0 Refill(s), Type: Maintenance  tamsulosin 0.4 mg oral capsule: 1 cap(s) (  0.4 mg ), po, daily, 0 Refill(s), Type: Maintenance  Suspended  potassium chloride: ( 10 mEq ), daily, 0 Refill(s), Type: Maintenance   Problem list:    All Problems (Selected)  Embolic cerebral infarction / SNOMED CT 1931262016 / Confirmed  HTN (hypertension) / SNOMED CT 0528739020 / Confirmed  Bilateral inguinal hernia / SNOMED CT 849957657 / Confirmed  Hematoma / SNOMED CT 1343637635 / Confirmed  Abnormal glucose / SNOMED CT 346008641 / Confirmed  Latent tuberculosis / SNOMED CT 17989314 / Confirmed  Anemia, blood loss / SNOMED CT 4586788688 / Confirmed  Hernia, ventral / SNOMED CT 2653685327 / Confirmed  History of deep venous thrombosis / SNOMED CT 4293197529 / Confirmed  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 / SNOMED CT 8303165864 / Confirmed  Insomnia / SNOMED CT 746488058 / Confirmed  History of MI (myocardial infarction) / SNOMED CT 7368169544 / Confirmed  Nonrheumatic mitral valve regurgitation / SNOMED CT 415819661 / Confirmed  Esophageal reflux / SNOMED CT 433938045 / Confirmed  Lumbar spondylosis / SNOMED CT 726298602 / Confirmed  Epistaxis / SNOMED CT 914630315 / Confirmed  Hyperplasia of prostate / SNOMED CT 520218107 / Confirmed  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 54583555 / Confirmed  Acute posthemorrhagic anemia / SNOMED CT 850580698 / Confirmed  Pain of right thumb / SNOMED CT 906525129 / Confirmed  Barretts esophagus / SNOMED CT 537624679 / Confirmed  Chronic alcohol use / SNOMED CT 921418775 / Confirmed  Hematuria / SNOMED CT 233926380 / Confirmed  Foot pain / SNOMED CT 780622024 / Confirmed  CHF (congestive heart failure) / SNOMED CT 81822325 / Confirmed  CAD (coronary artery disease) / SNOMED CT 82481184 / Confirmed  Hyperlipidemia / SNOMED CT 92795875 / Confirmed  Tuberculosis / SNOMED CT 67472864 / Confirmed      Histories   Past Medical History:    Active  Hematoma (9005690447): Onset on 12/15/2017 at 82 years.  Embolic cerebral infarction (7785530620): Onset on  "12/10/2017 at 82 years.  CHF (congestive heart failure) (59446593): Onset in the month of 11/2008 at 73 years  History of MI (myocardial infarction) (4554751979): Onset in the month of 11/2007 at 72 years  Barretts esophagus (426862206): Onset in the month of 1/2003 at 67 years  Traumatic blindness of left eye (98367943): Onset in 1999 at 64 years.  Comments:  8/18/2010 CDT 2:58 PM CDT - Olivia Coates CMA  \"ruptured blood vessel\"  HTN (hypertension) (4990814589): Onset in 1989 at 54 years.  Latent tuberculosis (13772732)  CAD (coronary artery disease) (53888531)  Acute posthemorrhagic anemia (222121469)  Hyperlipidemia (93980833)  Epistaxis (361097180)  Comments:  1/18/2018 CST 10:33 AM Thuy Topete  Chronic  Abnormal glucose (260858765)  Foot pain (388327670)  Nonrheumatic mitral valve regurgitation (990050548)  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 (8751759432)  Hematuria (554030465)  History of deep venous thrombosis (4248961119)  Comments:  1/18/2018 CST 9:57 AM Thuy Topete  Right lower extremity.  Iliac vein.  Anemia, blood loss (2822375584)  Tuberculosis (21285824)  Comments:  1/18/2018 CST 10:02 AM Thuy Topete  Patient tests positive.  See family history.  Bilateral inguinal hernia (825907725)  Hernia, ventral (3413580630)  Hyperplasia of prostate (947775231)  Comments:  1/18/2018 CST 10:24 AM Thuy Topete  Benign  Insomnia (716600123)  Pain of right thumb (349303365)  Comments:  1/18/2018 CST 10:36 AM Thuy Topete  Chronic  Chronic alcohol use (905671944)  Esophageal reflux (414333858)  Lumbar spondylosis (085614688)  Resolved  Inpatient stay (071256193): Onset on 12/21/2017 at 82 years.  Resolved on 1/18/2018 at 82 years.  Comments:  1/31/2018 CST 1:09 PM Blossom Art  @[facility], [place], [place] - Embolic cerebral infarction    1/31/2018 CST 1:14 PM Blossom Art  [facility] not ANW  H/O herpes zoster " (2027970385): Onset on 2009 at 73 years.  Resolved.  Age-related cataract of both eyes (918448613):  Resolved.  Acute kidney injury (56918961):  Resolved.  History of fracture of right ankle (2483858541):  Resolved.  Comments:  2018 CST 10:06 AM Thuy Topete  Right  Burst blood vessel in eye (630516563):  Resolved.  Comments:  2018 CST 10:05 AM Thuy Topete  Left  H/O fracture of wrist (8483145766):  Resolved.  Comments:  2018 CST 10:08 AM Thuy Topete  Right   Family History:    Cancer  Brother ()  Comments:  2010 3:29 PM - Jaxon Olivia OLEARY  Lung  Brother  Comments:  2010 3:29 PM - Jaxon Olivia OLEARY  Oral  Brother ()  Comments:  2010 3:29 PM - Jaxon OLEARYOlivia  colon  Brother ()  Leukemia  Sister (Adrianna, )  Lymphoma  Sister  Rheumatoid arthritis  Brother  TB - Tuberculosis  Father ()  Mother ()  HTN - Hypertension  Brother ()  Sister (Adrianna, )  Sister  Brother ()     Procedure history:    History of inferior vena cava filter placement (5705428927) on 12/10/2017 at 82 Years.  Mitral valve clip (1064904526) on 2017 at 82 Years.  Comments:  2018 1:16 PM - Blossom Varela  Complicated by air embolization and intermedius vein graft  PCI - Percutaneous coronary intervention (0422745154) on 2017 at 82 Years.  Comments:  2018 1:18 PM - Blossom Varela  via left femoral artery with coronary angiogram.  Intraaortic balloon pump placed and later removed.  Coronary angiogram (92326912) on 2017 at 81 Years.  Extracapsular cataract extraction and insertion of intraocular lens (729988692) on 2017 at 81 Years.  Comments:  2017 12:37 PM - Dalila Rondon  Right.  Extracapsular cataract extraction and insertion of intraocular lens (143660885) on 2017 at 81 Years.  Comments:  2017 1:44 PM - Dalila Rondon.  Coronary angiogram (15818727) on 3/6/2017 at 81 Years.  Repair  of recurrent ventral hernia w/mesh (002651363) on 8/21/2012 at 76 Years.  Repair of epigastric hernia (544701006) on 3/15/2012 at 76 Years.  right shoulder rotator cuff repair in the month of 8/2010 at 74 Years.  Comments:  8/18/2010 3:06 PM - RENARD GORMAN  Colonoscopy (181383029) in the month of 3/2009 at 73 Years.  Comments:  8/18/2010 3:02 PM - RENARD GORMAN  Normal  CABG x 2 - Coronary artery bypass grafts x 2 (873303448) in the month of 11/2007 at 72 Years.  laser for photocoagulation in the month of 3/2007 at 71 Years.  EGD in the month of 1/2003 at 67 Years.  Flexible sigmoidoscopy (88492491) on 10/12/2000 at 65 Years.  Flexible sigmoidoscopy (26305066) on 10/19/1993 at 58 Years.  right shoulder decompression in 1989 at 54 Years.  Wrist Fracture in 1987 at 52 Years.  Comments:  8/18/2010 3:07 PM - RENARD GORMAN  Compression fracutre of foot in 1978 at 43 Years.  L ankle compression in 1975 at 40 Years.  Bilateral vasectomy (791699397) in 1975 at 40 Years.  Back (783857554) in 1975 at 40 Years.  Comments:  1/18/2018 10:26 AM - Thuy Hopkins  Lumbar and thoracic.    1/18/2018 10:23 Thuy Boston  History of spine surgery.  Rotator cuff repair (278036589) in 1964 at 29 Years.  Comments:  2/24/2012 10:25 AM - Berhane Krause PA-C.  left shoulder  Hernia repair (49054246).  Comments:  8/18/2010 3:05 PM - RENARD GORMAN  Bilateral, Laparoscopy  vitreoretinal surgery.  ORIF - Open reduction of fracture of ankle with internal fixation (132657629416809).  Comments:  1/18/2018 10:11 AM - Thuy Hopkins   Social History:        Alcohol Assessment: Current            Current                     Comments:                      08/18/2010 - Renard Coates CMA                     rare      Tobacco Assessment: Denies Tobacco Use      Substance Abuse Assessment: Denies Substance Abuse      Home and Environment Assessment            Marital status: .        Physical Examination   Vital Signs   2/13/2018 9:21  AM CST Temperature Tympanic 98.8 DegF    Peripheral Pulse Rate 72 bpm    Pulse Site Radial artery    Respiratory Rate 20 br/min    Systolic Blood Pressure 176 mmHg  HI    Diastolic Blood Pressure 94 mmHg  HI    Mean Arterial Pressure 121 mmHg    BP Site Right arm    BP Systolic Repeat 146 mmHg    BP Diastolic Repeat 76 mmHg    BP Site Repeat Right arm    Oxygen Saturation 98 %      Measurements from flowsheet : Measurements   2/13/2018 9:21 AM CST Height Measured - Standard 68 in    Weight Measured - Standard 195 lb    BSA 2.06 m2    Body Mass Index 29.65 kg/m2  HI      General:  No acute distress.    Respiratory:  Lungs are clear to auscultation.    Cardiovascular:  Normal rate, Regular rhythm, No murmur, right calf edema, not tender or warm.    Musculoskeletal:  left hand:  good  strength, good movement of thumb against resistance.       Impression and Plan   Diagnosis     Anemia, blood loss (TQN49-ZM D50.0).     Embolic cerebral infarction (TMI14-RB I63.40).     History of deep venous thrombosis (PJP89-LM Z86.718).     HTN (hypertension) (SGY63-BK I10).     Peripheral edema (PNC25-KA R60.9).     Plan:  he has cancelled home PT and has stopped following up with neurology  will increase his lasix to 40 mg bid,  will increase metoprolol to 100 mg qd,   will check labs and refill his other meds  encouraged him to wear his compression stockings, do home exercises, follow up in a month.    Orders     Orders   Pharmacy:  metoprolol succinate 100 mg oral tablet, extended release (Prescribe): 1 tab(s) ( 100 mg ), PO, Daily, # 90 tab(s), 3 Refill(s), Type: Maintenance, Pharmacy: [facility] #[id number], 1 tab(s) po daily  metoprolol succinate 50 mg oral tablet, extended release (Discontinue).     Orders   Pharmacy:  furosemide 40 mg oral tablet (Prescribe): 1 tab(s) ( 40 mg ), PO, bid, x 30 day(s), # 60 tab(s), 1 Refill(s), Type: Maintenance, Pharmacy: [facility] #[id number], 1 tab(s) po bid,x30 day(s)  furosemide 20 mg  oral tablet (Discontinue).     Orders   Lab (Gen Lab  Reference Lab):  CBC (includes diff/plt)* ([facility]) (Order): Specimen Type: Blood, Collection Date: 2/13/2018 9:48 AM CST  Basic Metabolic Panel* ([facility]) (Order): Specimen Type: Serum, Collection Date: 2/13/2018 9:48 AM CST.     Orders   Requests (Return to Office):  Return to Office (Request) (Order): Return in 1 month.     Orders   Charges (Evaluation and Management):  80084 office outpatient visit 25 minutes (Charge) (Order): Quantity: 1, Embolic cerebral infarction  Anemia, blood loss  History of deep venous thrombosis  HTN (hypertension)  Peripheral edema.

## 2022-02-15 NOTE — NURSING NOTE
Pt refill request forms and all pertaing labwork and medical visit notes [facility] att[facility] Barbie Botello N f-[phone]. Confirmation received.  Vishnu Funes CMA

## 2022-02-15 NOTE — NURSING NOTE
Anticoagulation Therapy Management Entered On:  5/20/2020 2:23 PM CDT    Performed On:  5/20/2020 2:18 PM CDT by Karena Freeman RN               Anticoagulation Visit Assessment   Type of Visit :   Follow up visit RN   Anticoagulation Indication :   Atrial fibrillation, DVT prophylaxis   Anticoagulation Medication Verified :   Yes   Karena Freeman RN - 5/20/2020 2:18 PM CDT   Anticoagulation Patient Assessment Grid   Change in Alcohol Consumption :   No   Change in Diet :   No   Change in Medications :   No   Diarrhea :   No   Rectal Bleeding :   No   Signs of Clotting :   No   Signs of Warfarin Intolerance :   No   Unusual Bleeding, Bruising :   No   Upcoming Procedures :   No   Vomiting :   No   Karena Freeman RN - 5/20/2020 2:18 PM CDT   Patient on Warfarin :   Yes   Karena Freeman RN - 5/20/2020 2:18 PM CDT   Warfarin   International Normalization Ratio TR :   2.8    Anticoagulant INR Goal Lower :   2    Anticoagulant INR Goal Upper :   3    Sunday :   2.5 mg   Monday :   2.5 mg   Tuesday :   2.5 mg   Wednesday :   2.5 mg   Thursday :   2.5 mg   Friday :   2.5 mg   Saturday :   2.5 mg   Total Dose :   17.5 mg   Warfarin Pt Reported Previous Week Dose :    Sun Mon Tues Wed Thurs Fri Sat Weekly Total Dose   Week 1                   Week 2                   Week 3                   Week 4                         Patient is taking single or multiple strength tablet(s) :   Single strength tab(s)   One Tab Strength :   2.5 mg tab   Sunday :   2.5 mg   Monday :   2.5 mg   Tuesday :   2.5 mg   Wednesday :   2.5 mg   Thursday :   2.5 mg   Friday :   2.5 mg   Saturday :   2.5 mg   Warfarin Wk 1 Total Dose :   17.5 mg   Sunday :   1 tab(s)   Monday :   1 tab(s)   Tuesday :   1 tab(s)   Wednesday :   1 tab(s)   Thursday :   1 tab(s)   Friday :   1 tab(s)   Saturday :   1 tab(s)   Sunday :   2.5 mg   Monday :   2.5 mg   Tuesday :   2.5 mg   Wednesday :   2.5 mg   Thursday :   2.5 mg   Friday :   2.5 mg   Saturday  :   2.5 mg   Week 2 Total Dose :   17.5 mg   Sunday :   1 tab(s)   Monday :   1 tab(s)   Tuesday :   1 tab(s)   Wednesday :   1 tab(s)   Thursday :   1 tab(s)   Friday :   1 tab(s)   Saturday :   1 tab(s)   Patient Instructions :   INR = 2.8 per Jocelyne ORTEGA from [facility] on 5/20/20 Patient is to stay on 2.5mg warfarin daily and recheck INR in 15 days per protocol. Directions called to Jocelyne at [facility].     Pete ORTEGA, [place] - 5/20/2020 2:18 PM CDT   Medication History   Medication List   (As Of: 5/20/2020 2:23:55 PM CDT)   Prescription/Discharge Order    nitroglycerin  :   nitroglycerin ; Status:   Prescribed ; Ordered As Mnemonic:   nitroglycerin 0.4 mg sublingual tablet ; Simple Display Line:   0.4 mg, 1 tab(s), SL, q5 min, If chest pain persists 5 minutes after first dose, call 911. May repeat Q5min x 3 doses., PRN: for chest pain, 25 tab(s), 0 Refill(s) ; Ordering Provider:   Berhane Krause PA-C; Catalog Code:   nitroglycerin ; Order Dt/Tm:   3/19/2020 4:08:14 PM CDT          rosuvastatin  :   rosuvastatin ; Status:   Prescribed ; Ordered As Mnemonic:   rosuvastatin 40 mg oral tablet ; Simple Display Line:   40 mg, 1 tab(s), Oral, daily, 30 tab(s), 2 Refill(s) ; Ordering Provider:   Berhane Krause PA-C; Catalog Code:   rosuvastatin ; Order Dt/Tm:   3/20/2020 3:26:32 PM CDT          losartan  :   losartan ; Status:   Prescribed ; Ordered As Mnemonic:   losartan 50 mg oral tablet ; Simple Display Line:   50 mg, 1 tab(s), Oral, daily, 90 tab(s), 1 Refill(s) ; Ordering Provider:   Berhane Krause PA-C; Catalog Code:   losartan ; Order Dt/Tm:   3/25/2020 2:13:15 PM CDT          metoprolol  :   metoprolol ; Status:   Prescribed ; Ordered As Mnemonic:   metoprolol succinate 50 mg oral tablet, extended release ; Simple Display Line:   50 mg, 1 tab(s), PO, Daily, 90 tab(s), 3 Refill(s) ; Ordering Provider:   Berhane Krause PA-C; Catalog Code:   metoprolol ; Order Dt/Tm:   1/9/2020 11:49:05 AM CST          tamsulosin 0.4  mg oral capsule  :   tamsulosin 0.4 mg oral capsule ; Status:   Prescribed ; Ordered As Mnemonic:   tamsulosin 0.4 mg oral capsule ; Simple Display Line:   1 cap(s), Oral, daily, 270 unknown unit ; Ordering Provider:   Berhane Krause PA-C; Catalog Code:   tamsulosin ; Order Dt/Tm:   10/2/2019 1:08:15 PM CDT          warfarin  :   warfarin ; Status:   Prescribed ; Ordered As Mnemonic:   warfarin 2.5 mg oral tablet ; Simple Display Line:   See Instructions, TAKE 1 TABLET BY MOUTH EVERY OTHER DAY ALTERNATE WITH TAKE 0.5 TABLET EVERY OTHER DAY, 135 tab(s), 1 Refill(s) ; Ordering Provider:   Berhane Krause PA-C; Catalog Code:   warfarin ; Order Dt/Tm:   12/10/2019 8:14:15 PM CST          finasteride  :   finasteride ; Status:   Prescribed ; Ordered As Mnemonic:   finasteride 5 mg oral tablet ; Simple Display Line:   See Instructions, TAKE 1 TABLET BY MOUTH ONCE DAILY, 90 tab(s), 1 Refill(s) ; Ordering Provider:   Berhane Krause PA-C; Catalog Code:   finasteride ; Order Dt/Tm:   3/9/2020 1:40:47 PM CDT            Home Meds    bumetanide  :   bumetanide ; Status:   Documented ; Ordered As Mnemonic:   bumetanide 2 mg oral tablet ; Simple Display Line:   2 mg, 1 tab(s), Oral, bid, morning and noontime, 30 tab(s), 0 Refill(s) ; Catalog Code:   bumetanide ; Order Dt/Tm:   1/8/2020 1:22:15 PM CST          potassium chloride  :   potassium chloride ; Status:   Documented ; Ordered As Mnemonic:   potassium chloride 20 mEq oral tablet, extended release ; Simple Display Line:   20 mEq, 1 tab(s), Oral, bid, 60 tab(s), 0 Refill(s) ; Catalog Code:   potassium chloride ; Order Dt/Tm:   8/16/2019 9:27:40 AM CDT          acetaminophen  :   acetaminophen ; Status:   Documented ; Ordered As Mnemonic:   acetaminophen 325 mg oral capsule ; Simple Display Line:   650 mg, 2 cap(s), po, q4 hrs, not to exceed 4000 mg/day, 0 Refill(s) ; Catalog Code:   acetaminophen ; Order Dt/Tm:   1/18/2018 10:13:21 AM CST

## 2022-02-15 NOTE — TELEPHONE ENCOUNTER
---------------------  From: Talia Panchal   To: Xi Francisco MD;     Sent: 9/20/2019 3:55:00 PM CDT  Subject: Scheduling Management     Patient no showed appointment today. Appointment was for an ADHD follow up. Pt did reschedule for 9.23.19

## 2022-02-15 NOTE — LETTER
(Inserted Image. Unable to display)   February 06, 2019      PUMA BAEZ      [address], [place] [id number]        Dear PUMA,    Thank you for selecting [facility] for your healthcare needs.  Below you will find the results of you recent tests done at our clinic.    All of these labs look okay.  BNP (a test of your heart function) has improved from January.  A copy of these labs has been sent to your cardiologist.      Result Name Current Result Previous Result Reference Range   Sodium Level (mmol/L)  140 2/5/2019  140 1/3/2019 135 - 146   Potassium Level (mmol/L)  4.3 2/5/2019  4.0 1/3/2019 3.5 - 5.3   Chloride Level (mmol/L)  104 2/5/2019  102 1/3/2019 98 - 110   CO2 Level (mmol/L)  32 2/5/2019  32 1/3/2019 20 - 32   Glucose Level (mg/dL)  93 2/5/2019 ((H)) 105 1/3/2019 65 - 99   BUN (mg/dL)  17 2/5/2019  20 1/3/2019 7 - 25   Creatinine Level (mg/dL) ((H)) 1.20 2/5/2019 ((H)) 1.16 1/3/2019 0.70 - 1.11   BUN/Creat Ratio  14 2/5/2019  17 1/3/2019 6 - 22   eGFR (mL/min/1.73m2) ((L)) 56 2/5/2019 ((L)) 58 1/3/2019 > OR = 60 -    eGFR  (mL/min/1.73m2)  64 2/5/2019  67 1/3/2019 > OR = 60 -    Calcium Level (mg/dL)  9.2 2/5/2019  9.2 1/3/2019 8.6 - 10.3   B-Natriuretic Peptide (BNP) (pg/mL) ((H)) 350 2/5/2019 ((H)) 551 1/3/2019  - <100   WBC  5.1 2/5/2019  6.3 1/3/2019 3.8 - 10.8   RBC  4.39 2/5/2019  4.50 1/3/2019 4.20 - 5.80   Hgb (gm/dL)  14.1 2/5/2019  14.5 1/3/2019 13.2 - 17.1   Hct (%)  40.2 2/5/2019  41.4 1/3/2019 38.5 - 50.0   MCV (fL)  91.6 2/5/2019  92.0 1/3/2019 80.0 - 100.0   MCH (pg)  32.1 2/5/2019  32.2 1/3/2019 27.0 - 33.0   MCHC (gm/dL)  35.1 2/5/2019  35.0 1/3/2019 32.0 - 36.0   RDW (%)  12.6 2/5/2019  12.1 1/3/2019 11.0 - 15.0   Platelet  172 2/5/2019  191 1/3/2019 140 - 400   MPV (fL)  10.9 2/5/2019  10.6 1/3/2019 7.5 - 12.5   Abs Lymphocytes  974 2/5/2019  1,128 1/3/2019 850 - 3,900   Abs Neutrophils  2,938 2/5/2019  4,007 1/3/2019 1,500 -  7,800   Abs Monocytes  796 2/5/2019  838 1/3/2019 200 - 950   Abs Eosinophils  342 2/5/2019  290 1/3/2019 15 - 500   Abs Basophils  51 2/5/2019  38 1/3/2019 0 - 200       Please contact me or my assistant at [phone] if you have any questions.     Sincerely,        Berhane Krause PA-C    What do your labs mean?  Below is a glossary of commonly ordered labs:  LDL   Bad Cholesterol   HDL   Good Cholesterol  AST/ALT   Liver Function   Cr/Creatinine   Kidney Function  Microalbumin   Kidney Function  BUN   Kidney Function  PSA   Prostate    TSH   Thyroid Hormone  HgbA1c   Diabetes Test   Hgb (Hemoglobin)   Red Blood Cells

## 2022-02-15 NOTE — PROGRESS NOTES
Patient:   PUMA BAEZ            MRN: [id number]            FIN: [id number]               Age:   83 years     Sex:  Male     :  1935   Associated Diagnoses:   CHF (congestive heart failure); History of deep venous thrombosis; Nonrheumatic mitral valve regurgitation; Pneumonia   Author:   Berhane Krause PA-C      Visit Information   Accompanied by:  daughter.       Chief Complaint   2019 9:17 AM CDT    Pt here for productive cough x week.  Pt also here for post hospital visit for SOB and Right leg DVT        History of Present Illness   Chief complaint and symptoms noted above and confirmed with patient   was discharged  after being hospitalized at [facility] for right leg DVT, CT was negative for PE  was started on Xarelto  he did talk with his cardiologist and has decided to proceed with the mitral valve clip procedure      Review of Systems   Constitutional:  Fatigue, No fever.    Respiratory:  Shortness of breath, Cough, Sputum production.       Health Status   Allergies:    Allergic Reactions (All)  Severity Not Documented  Narcotics (Severe constipation)  Vicodin (Urinary retention)  Canceled/Inactive Reactions (All)  No known allergies   Medications:  (Selected)   Prescriptions  Prescribed  FLUoxetine 20 mg oral capsule: = 2 cap(s) ( 40 mg ), po, daily, # 90 cap(s), 3 Refill(s), Type: Maintenance, Pharmacy: [id number]  aspirin 81 mg oral tablet: 2 tab(s) ( 162 mg ), po, daily, # 100 tab(s), 0 Refill(s), Type: Maintenance, OTC (Rx)  ferrous gluconate 324 mg (37.5 mg elemental iron) oral tablet: 1 tab(s) ( 324 mg ), po, daily, # 90 tab(s), 3 Refill(s), Type: Maintenance  finasteride 5 mg oral tablet: = 1 tab(s) ( 5 mg ), PO, Daily, # 90 tab(s), 3 Refill(s), Type: Maintenance, Pharmacy: [facility] #[id number], 1 tab(s) Oral daily  metoprolol succinate 50 mg oral tablet, extended release: = 1 tab(s) ( 50 mg ), PO, Daily, # 90 tab(s), 3 Refill(s), Type: Maintenance, [id number], 1 tab(s) Oral daily  tamsulosin 0.4 mg oral capsule: = 1 cap(s) ( 0.4 mg ), po, daily, # 90 cap(s), 3 Refill(s), Type: Maintenance, Pharmacy: [facility] #[id number], 1 cap(s) Oral daily  Documented Medications  Documented  Xarelto 15 mg oral tablet: = 1 tab(s) ( 15 mg ), Oral, bid, Instructions: 15 mg bid for 3 weeks then 20 mg daily, 0 Refill(s), Type: Maintenance  acetaminophen 325 mg oral capsule: 1 cap(s) ( 325 mg ), po, tid, 0 Refill(s), Type: Maintenance  amLODIPine 5 mg oral tablet: 1 tab(s) ( 5 mg ), PO, Daily, # 30 tab(s), 0 Refill(s), Type: Maintenance  atorvastatin 40 mg oral tablet: 1 tab(s) ( 40 mg ), Oral, daily, Instructions: Per Cardio note 8/8/2018, 0 Refill(s), Type: Maintenance  furosemide 40 mg oral tablet: See Instructions, Instructions: Take 80mg in the AM and 20mg in the afternoon  Per cardio from 7/3/19, 0 Refill(s), Type: Maintenance  losartan 50 mg oral tablet: = 1 tab(s) ( 50 mg ), Oral, daily, # 30 tab(s), 0 Refill(s), Type: Maintenance  potassium chloride 20 mEq oral tablet, extended release: = 1 tab(s) ( 20 mEq ), Oral, bid, # 60 tab(s), 0 Refill(s), Type: Maintenance  Suspended  potassium chloride: ( 10 mEq ), daily, 0 Refill(s), Type: Maintenance   Problem list:    All Problems (Selected)  Embolic cerebral infarction / SNOMED CT 2024206281 / Confirmed  Bilateral inguinal hernia / SNOMED CT 670110816 / Confirmed  Hematoma / SNOMED CT 4164873411 / Confirmed  Abnormal glucose / SNOMED CT 561180215 / Confirmed  Latent tuberculosis / SNOMED CT 50447182 / Confirmed  Anemia, blood loss / SNOMED CT 0523031152 / Confirmed  Hernia, ventral / SNOMED CT 9907315421 / Confirmed  Obesity / SNOMED CT 4795905175 / Probable  History of deep venous thrombosis / SNOMED CT 9740990677 / Confirmed  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 / SNOMED CT 4405980638 / Confirmed  S/P mitral valve clip implantation / SNOMED CT 6307887401 / Confirmed  S/P mitral valve clip  "implantation / SNOMED CT 7819570859 / Confirmed  Insomnia / SNOMED CT 522504365 / Confirmed  History of MI (myocardial infarction) / SNOMED CT 8959139932 / Confirmed  Nonrheumatic mitral valve regurgitation / SNOMED CT 183094205 / Confirmed  Esophageal reflux / SNOMED CT 271148548 / Confirmed  Lumbar spondylosis / SNOMED CT 286357349 / Confirmed  Epistaxis / SNOMED CT 435837130 / Confirmed  Hyperplasia of prostate / SNOMED CT 809105208 / Confirmed  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 77425210 / Confirmed  Urinary retention / SNOMED CT 969201941 / Confirmed  Acute posthemorrhagic anemia / SNOMED CT 323208908 / Confirmed  Left hemiparesis / SNOMED CT 679149068 / Confirmed  Paroxysmal atrial fibrillation / SNOMED CT 511064086 / Confirmed  Pain of right thumb / SNOMED CT 752698833 / Confirmed  Barretts esophagus / SNOMED CT 633151078 / Confirmed  Chronic alcohol use / SNOMED CT 965211704 / Confirmed  Hematuria / SNOMED CT 347402333 / Confirmed  Foot pain / SNOMED CT 095479814 / Confirmed  Depression / SNOMED CT 59178875 / Confirmed  CHF (congestive heart failure) / SNOMED CT 23894005 / Confirmed  Hypertensive heart disease with heart failure / SNOMED CT 78683261 / Confirmed  CAD (coronary artery disease) / SNOMED CT 1935 / Confirmed  Hyperlipidemia / SNOMED CT 19058470 / Confirmed  Tuberculosis / SNOMED CT 73746404 / Confirmed      Histories   Past Medical History:    Active  Hematoma (4289876878): Onset on 12/15/2017 at 82 years.  Embolic cerebral infarction (5348769241): Onset on 12/10/2017 at 82 years.  CHF (congestive heart failure) (09853100): Onset in the month of 11/2008 at 73 years  History of MI (myocardial infarction) (4486864816): Onset in the month of 11/2007 at 72 years  Barretts esophagus (224373369): Onset in the month of 1/2003 at 67 years  Traumatic blindness of left eye (86283595): Onset in 1999 at 64 years.  Comments:  8/18/2010 CDT 2:58 PM CDT - Jaxon OLEARY, Olivia  \"ruptured blood " "vessel\"  Hypertensive heart disease with heart failure (35771662): Onset in 1989 at 54 years.  Latent tuberculosis (67755556)  CAD (coronary artery disease) (34127919)  Acute posthemorrhagic anemia (983241999)  Hyperlipidemia (07638289)  Epistaxis (972516127)  Comments:  1/18/2018 CST 10:33 AM Thuy Topete  Chronic  Abnormal glucose (700036267)  Foot pain (095684606)  Nonrheumatic mitral valve regurgitation (961437067)  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 (8075267591)  Hematuria (086508960)  History of deep venous thrombosis (4200229875)  Comments:  1/18/2018 CST 9:57 AM Thuy Topete  Right lower extremity.  Iliac vein.    7/29/2019 CDT 9:53 AM Blossom Aly  07/25/2019 - right leg DVT  Anemia, blood loss (8933924459)  Tuberculosis (75929545)  Comments:  1/18/2018 CST 10:02 AM Thuy Topete  Patient tests positive.  See family history.  Bilateral inguinal hernia (997452131)  Hernia, ventral (5657585069)  Hyperplasia of prostate (432587354)  Comments:  1/18/2018 CST 10:24 AM Thuy Topete  Benign  Insomnia (720509547)  Pain of right thumb (936900152)  Comments:  1/18/2018 CST 10:36 AM Thuy Topete  Chronic  Chronic alcohol use (121815984)  Esophageal reflux (425233641)  Lumbar spondylosis (172320208)  Paroxysmal atrial fibrillation (200398397)  Depression (10296211)  Resolved  Inpatient stay (224102774): Onset on 7/22/2019 at 83 years.  Resolved on 7/25/2019 at 83 years.  Comments:  7/29/2019 CDT 9:47 AM Blossom Aly  @[place], [place] - Acute on chronic systolic and diastolic CHF exacerbation secondary to severe mitral regurgitation  Inpatient stay (572347805): Onset on 12/21/2017 at 82 years.  Resolved on 1/18/2018 at 82 years.  Comments:  1/31/2018 CST 1:09 PM Blossom Art  @[facility], [facility], [place] - Embolic cerebral infarction    1/31/2018 CST 1:14 PM CST - Blossom Varela [facility] not " AN  H/O herpes zoster (1686256267): Onset on 2009 at 73 years.  Resolved.  Age-related cataract of both eyes (825266757):  Resolved.  Acute kidney injury (49859174):  Resolved.  History of fracture of right ankle (0375606774):  Resolved.  Comments:  2018 CST 10:06 AM TRISTIN Alejo Cindi Hopkinse  Right  Burst blood vessel in eye (495229251):  Resolved.  Comments:  2018 CST 10:05 AM TRISTIN Hopkins  Thuy  Left  H/O fracture of wrist (2852912213):  Resolved.  Comments:  2018 CST 10:08 AM TRISTIN Hopkins Cindie  Right   Family History:    Cancer  Brother ()  Comments:  2010 3:29 PM CDT - Jaxon CMAOlivia  Lung  Brother  Comments:  2010 3:29 PM CDT - Jaxon MAMTAOlivia  Oral  Brother ()  Comments:  2010 3:29 PM CDT - Jaxon MAMTA Olivia  colon  Brother ()  Leukemia  Sister (Adrianna, )  Lymphoma  Sister  Rheumatoid arthritis  Brother  TB - Tuberculosis  Father ()  Mother ()  HTN - Hypertension  Brother ()  Sister (Adrianna, )  Sister  Brother ()     Procedure history:    History of inferior vena cava filter placement (2015928219) on 12/10/2017 at 82 Years.  Mitral valve clip (3668673087) on 2017 at 82 Years.  Comments:  2018 1:16 PM Blossom Art  Complicated by air embolization and intermedius vein graft  PCI - Percutaneous coronary intervention (2490520629) on 2017 at 82 Years.  Comments:  2018 1:18 PM Blossom Art  via left femoral artery with coronary angiogram.  Intraaortic balloon pump placed and later removed.  Coronary angiogram (67356200) on 2017 at 81 Years.  Extracapsular cataract extraction and insertion of intraocular lens (073190176) on 2017 at 81 Years.  Comments:  2017 12:37 PM CDT - Dalila Rondon  Right.  Extracapsular cataract extraction and insertion of intraocular lens (346249027) on 2017 at 81 Years.  Comments:  2017 1:44 PM CDT - Dalila Rondon.  Coronary  angiogram (70110910) on 3/6/2017 at 81 Years.  Repair of recurrent ventral hernia w/mesh (191380686) on 8/21/2012 at 76 Years.  Repair of epigastric hernia (982153451) on 3/15/2012 at 76 Years.  right shoulder rotator cuff repair in the month of 8/2010 at 74 Years.  Comments:  8/18/2010 3:06 PM RENARD BEVERLY  Colonoscopy (535956227) in the month of 3/2009 at 73 Years.  Comments:  8/18/2010 3:02 PM RENARD BEVERLY  Normal  CABG x 2 - Coronary artery bypass grafts x 2 (141558808) in the month of 11/2007 at 72 Years.  laser for photocoagulation in the month of 3/2007 at 71 Years.  EGD in the month of 1/2003 at 67 Years.  Flexible sigmoidoscopy (22878150) on 10/12/2000 at 65 Years.  Flexible sigmoidoscopy (93053507) on 10/19/1993 at 58 Years.  right shoulder decompression in 1989 at 54 Years.  Wrist Fracture in 1987 at 52 Years.  Comments:  8/18/2010 3:07 PM RENARD BEVERLY  Compression fracutre of foot in 1978 at 43 Years.  L ankle compression in 1975 at 40 Years.  Bilateral vasectomy (572786862) in 1975 at 40 Years.  Back (589575749) in 1975 at 40 Years.  Comments:  1/18/2018 10:26 AM Thuy Topete  Lumbar and thoracic.    1/18/2018 10:23 AM Thuy Topete  History of spine surgery.  Rotator cuff repair (916089968) in 1964 at 29 Years.  Comments:  2/24/2012 10:25 AM Berhane Waggoner PA-C.  left shoulder  Hernia repair (46201267).  Comments:  8/18/2010 3:05 PM RENARD BEVERLY  Bilateral, Laparoscopy  vitreoretinal surgery.  ORIF - Open reduction of fracture of ankle with internal fixation (408040037912051).  Comments:  1/18/2018 10:11 AM Thuy Topete  Right      Physical Examination   Vital Signs   8/16/2019 9:17 AM CDT Temperature Tympanic 97.2 DegF  LOW    Peripheral Pulse Rate 88 bpm    Pulse Site Radial artery    Respiratory Rate 20 br/min    Systolic Blood Pressure 132 mmHg  HI    Diastolic Blood Pressure 88 mmHg  HI    Mean Arterial Pressure 103 mmHg    BP Site Right  arm    Oxygen Saturation 99 %      Measurements from flowsheet : Measurements   8/16/2019 9:17 AM CDT Height Measured - Standard 68 in    Weight Measured - Standard 195 lb    BSA 2.06 m2    Body Mass Index 29.65 kg/m2  HI      General:  No acute distress.    Respiratory:  lungs have coarse ronchi bilaterally, no wheezes, slight  rales in lleft lower lobe.    Cardiovascular:  Irregularly irregular rhythm, No edema.       Review / Management   Documentation reviewed:  Records from referring facility.       Impression and Plan   Diagnosis     CHF (congestive heart failure) (AYY50-TI I50.9).     History of deep venous thrombosis (XDY66-XC Z86.718).     Nonrheumatic mitral valve regurgitation (IUV86-UX I34.0).     Pneumonia (HFI45-JW J18.9).     Summary:  will treat the pneumonia with zpak along with mucinex,  continue on xarelto  follow up if not improving  he has cardiology appt at the end of the month.    Orders     Orders   Pharmacy:  Zithromax Z-Kolby 250 mg oral tablet (Prescribe): Take 2 tablets on Day 1 and then 1 tablet, Oral, daily, Instructions: until finished, # 6 tab(s), 0 Refill(s), Type: Maintenance, Pharmacy: [facility] #[id number], Take 2 tablets on Day 1 and then 1 tablet Oral daily,Instr:until finished.     Orders   Charges (Evaluation and Management):  31549 office outpatient visit 25 minutes (Charge) (Order): Quantity: 1, Pneumonia  CHF (congestive heart failure)  History of deep venous thrombosis  Nonrheumatic mitral valve regurgitation.

## 2022-02-15 NOTE — NURSING NOTE
Quick Intake Entered On:  10/16/2019 4:43 PM CDT    Performed On:  10/16/2019 4:43 PM CDT by Karena Freeman RN               Summary   Height Measured :   68 in(Converted to: 5 ft 8 in, 172.72 cm)    Systolic Blood Pressure :   122 mmHg   Diastolic Blood Pressure :   86 mmHg (HI)    Mean Arterial Pressure :   98 mmHg   Peripheral Pulse Rate :   76 bpm   Oxygen Saturation :   86 % (LOW)    Karena Freeman RN - 10/16/2019 4:43 PM CDT   More Vitals   Oxygen Saturation :   90 % (LOW)    Karena Freeman RN - 10/16/2019 4:43 PM CDT

## 2022-02-15 NOTE — NURSING NOTE
Anticoagulation Therapy Entered On:  4/23/2020 9:44 AM CDT    Performed On:  4/23/2020 9:43 AM CDT by Darlene Thomas RN               Warfarin Management   Week 1 Sunday Dose :   2.5    Week 1 Monday Dose :   2.5    Week 1 Tuesday Dose :   2.5    Week 1 Wednesday Dose :   5    Week 1 Thursday Dose :   2.5    Week 1 Friday Dose :   2.5    Week 1 Saturday Dose :   2.5    Week 1 Dose Total :   20    Week 2 Sunday Dose :   2.5    Week 2 Monday Dose :   2.5    Week 2 Tuesday Dose :   2.5    Week 2 Wednesday Dose :   2.5    Week 2 Thursday Dose :   2.5    Week 2 Friday Dose :   2.5    Week 2 Saturday Dose :   2.5    Week 2 Dose Total :   17.5    Planned Duration :   Indefinite   Indication :   Atrial fibrillation, DVT   Warfarin Management Comments :   Per JORDAN Casanova w/Mirela - pt's Losartan and Metorpolol decreased this week   INR Range :   2.0 - 3.0   INR Therapeutic Range :   Yes   Anticoagulation Recheck :   2 weeks   Warfarin Special Instructions :   INR = 2.1 per [facility]. Continue Warfarin 2.5mg daily. Recheck INR in 2 weeks per protocol. Advised Hospice RN by phone.    Darlene Thomas RN - 4/23/2020 9:43 AM CDT

## 2022-02-15 NOTE — PROGRESS NOTES
Patient:   PUMA BAEZ            MRN: [id number]            FIN: [id number]               Age:   82 years     Sex:  Male     :  1935   Associated Diagnoses:   Pre-operative examination; Presence of IVC filter; Embolic cerebral infarction; History of deep venous thrombosis; Nonrheumatic mitral valve regurgitation; S/P mitral valve clip implantation   Author:   Berhane Krause PA-C      Preoperative Information   Procedure/ Case: IVC filter removal   Location scheduled: [facility]   Date/ Time scheduled: 3/13/2018 10:00:00 AM      Chief Complaint   3/6/2018 9:54 AM CST     Pt here for Preop Px for removal of IVC Filter at [place] by Dr Merida.  DOS 3-13-18. [phone]      Review of Systems   Constitutional:  Weakness, Fatigue.    Ear/Nose/Mouth/Throat:  Negative.    Respiratory:  Negative.    Cardiovascular:  Negative.    Gastrointestinal:  Negative.       Health Status   Allergies:    Allergic Reactions (Selected)  Severity Not Documented  Narcotics (Severe constipation)  Vicodin (Urinary retention)   Medications:  (Selected)   Prescriptions  Prescribed  FLUoxetine 20 mg oral capsule: 1 cap(s) ( 20 mg ), po, daily, # 90 cap(s), 3 Refill(s), Type: Maintenance  aspirin 81 mg oral tablet: See Instructions, Instructions: 1 tab(s) po every third day, # 100 tab(s), 0 Refill(s), Type: Maintenance, OTC (Rx)  atorvastatin 40 mg oral tablet: 1 tab(s) ( 40 mg ), PO, Daily, # 90 tab(s), 3 Refill(s), Type: Maintenance  famotidine 20 mg oral tablet: 1 tab(s) ( 20 mg ), po, daily, # 90 tab(s), 3 Refill(s), Type: Maintenance  ferrous gluconate 324 mg (37.5 mg elemental iron) oral tablet: 1 tab(s) ( 324 mg ), po, daily, # 90 tab(s), 3 Refill(s), Type: Maintenance  finasteride 5 mg oral tablet: 1 tab(s) ( 5 mg ), PO, Daily, # 90 tab(s), 3 Refill(s), Type: Maintenance  furosemide 40 mg oral tablet: 1 tab(s) ( 40 mg ), PO, bid, # 60 tab(s), 1 Refill(s), Type: Maintenance  gabapentin 300 mg  oral capsule: 1 cap(s) ( 300 mg ), po, qhs, # 90 cap(s), 3 Refill(s), Type: Maintenance  losartan 25 mg oral tablet: 1 tab(s) ( 25 mg ), PO, Daily, # 90 tab(s), 3 Refill(s), Type: Maintenance  metoprolol succinate 50 mg oral tablet, extended release: 1 tab(s) ( 50 mg ), PO, Daily, # 90 tab(s), 3 Refill(s), Type: Maintenance  tamsulosin 0.4 mg oral capsule: 1 cap(s) ( 0.4 mg ), po, daily, # 90 cap(s), 3 Refill(s), Type: Maintenance  warfarin 3 mg oral tablet: 1 tab(s) ( 3 mg ), PO, Daily, # 90 tab(s), 1 Refill(s), Type: Maintenance  Documented Medications  Documented  Fish Oil: ( 1,000 mg ), po, cap(s), 0 Refill(s), Type: Maintenance  acetaminophen 325 mg oral capsule: 1 cap(s) ( 325 mg ), po, tid, 0 Refill(s), Type: Maintenance  clotrimazole 1% topical cream: 1 annmarie, top, bid, 0 Refill(s), Type: Maintenance  melatonin 3 mg oral tablet: 1 tab(s) ( 3 mg ), po, daily, 0 Refill(s), Type: Maintenance  polyethylene glycol 3350 oral powder for reconstitution: ( 17 gm ), po, daily, 0 Refill(s), Type: Maintenance  Suspended  potassium chloride: ( 10 mEq ), daily, 0 Refill(s), Type: Maintenance   Problem list:    All Problems  Embolic cerebral infarction / SNOMED CT 4790956615 / Confirmed  HTN (hypertension) / SNOMED CT 2328979391 / Confirmed  Bilateral inguinal hernia / SNOMED CT 309803711 / Confirmed  Hematoma / SNOMED CT 6733922066 / Confirmed  Abnormal glucose / SNOMED CT 611834192 / Confirmed  Latent tuberculosis / SNOMED CT 66659571 / Confirmed  Anemia, blood loss / SNOMED CT 3251452557 / Confirmed  Hernia, ventral / SNOMED CT 6941665098 / Confirmed  Obesity / SNOMED CT 0738319388 / Probable  History of deep venous thrombosis / SNOMED CT 5077783720 / Confirmed  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 / SNOMED CT 0361613108 / Confirmed  Insomnia / SNOMED CT 725487519 / Confirmed  History of MI (myocardial infarction) / SNOMED CT 9688874615 / Confirmed  Nonrheumatic mitral valve regurgitation / SNOMED CT  "194752165 / Confirmed  Esophageal reflux / SNOMED CT 111578033 / Confirmed  Lumbar spondylosis / SNOMED CT 700345938 / Confirmed  Epistaxis / SNOMED CT 545193851 / Confirmed  Hyperplasia of prostate / SNOMED CT 137308233 / Confirmed  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 95308746 / Confirmed  Acute posthemorrhagic anemia / SNOMED CT 013498927 / Confirmed  Pain of right thumb / SNOMED CT 646542372 / Confirmed  Barretts esophagus / SNOMED CT 434541574 / Confirmed  Chronic alcohol use / SNOMED CT 717752338 / Confirmed  Hematuria / SNOMED CT 889061395 / Confirmed  Foot pain / SNOMED CT 779547614 / Confirmed  CHF (congestive heart failure) / SNOMED CT 54428072 / Confirmed  CAD (coronary artery disease) / SNOMED CT 39109832 / Confirmed  Hyperlipidemia / SNOMED CT 72315001 / Confirmed  Tuberculosis / SNOMED CT 60244017 / Confirmed  Inactive: CABG x 2 - Coronary artery bypass grafts x 2 / SNOMED CT 950238214  Resolved: History of fracture of right ankle / SNOMED CT 2662854332  Resolved: H/O fracture of wrist / SNOMED CT 2049776804  Resolved: Burst blood vessel in eye / SNOMED CT 404584032  Resolved: H/O herpes zoster / SNOMED CT 1288709101  Resolved: Inpatient stay / SNOMED CT 263514292  Resolved: Age-related cataract of both eyes / SNOMED CT 512178473  Resolved: Acute kidney injury / SNOMED CT 56249791      Histories   Past Medical History:    Active  Hematoma (9187920876): Onset on 12/15/2017 at 82 years.  Embolic cerebral infarction (3482821230): Onset on 12/10/2017 at 82 years.  CHF (congestive heart failure) (09855262): Onset in the month of 11/2008 at 73 years  History of MI (myocardial infarction) (9880258402): Onset in the month of 11/2007 at 72 years  Barretts esophagus (931097166): Onset in the month of 1/2003 at 67 years  Traumatic blindness of left eye (12706205): Onset in 1999 at 64 years.  Comments:  8/18/2010 CDT 2:58 PM CDT - Olivia Coates CMA  \"ruptured blood vessel\"  HTN (hypertension) (8492462430): " Onset in 1989 at 54 years.  Latent tuberculosis (77030020)  CAD (coronary artery disease) (45458329)  Acute posthemorrhagic anemia (671275773)  Hyperlipidemia (01624919)  Epistaxis (883987203)  Comments:  1/18/2018 CST 10:33 AM Thuy Topete  Chronic  Abnormal glucose (799976565)  Foot pain (801677998)  Nonrheumatic mitral valve regurgitation (330988921)  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 (6450824617)  Hematuria (319700133)  History of deep venous thrombosis (0876261120)  Comments:  1/18/2018 CST 9:57 AM Thuy Topete  Right lower extremity.  Iliac vein.  Anemia, blood loss (3295986196)  Tuberculosis (56308122)  Comments:  1/18/2018 CST 10:02 AM Thuy Topete  Patient tests positive.  See family history.  Bilateral inguinal hernia (876594534)  Hernia, ventral (1032445547)  Hyperplasia of prostate (453806556)  Comments:  1/18/2018 CST 10:24 AM Thuy Topete  Benign  Insomnia (142984320)  Pain of right thumb (523775307)  Comments:  1/18/2018 CST 10:36 AM Thuy Topete  Chronic  Chronic alcohol use (485206712)  Esophageal reflux (630233801)  Lumbar spondylosis (003221311)  Resolved  Inpatient stay (217582735): Onset on 12/21/2017 at 82 years.  Resolved on 1/18/2018 at 82 years.  Comments:  1/31/2018 CST 1:09 PM Blossom Art  @[place], [place] - Embolic cerebral infarction    1/31/2018 CST 1:14 PM Blossom Art  [facility]  H/O herpes zoster (4031442779): Onset on 6/30/2009 at 73 years.  Resolved.  Age-related cataract of both eyes (215065913):  Resolved.  Acute kidney injury (41178978):  Resolved.  History of fracture of right ankle (1196738557):  Resolved.  Comments:  1/18/2018 CST 10:06 AM Thuy Topete  Right  Burst blood vessel in eye (167181258):  Resolved.  Comments:  1/18/2018 CST 10:05 AM CST - Thuy Hopkins  Left  H/O fracture of wrist (3564456260):  Resolved.  Comments:  1/18/2018 CST 10:08 AM  CST - Thuy Hopkins   Family History:    Cancer  Brother ()  Comments:  2010 3:29 PM - Jaxon Renard OLEARY  Lung  Brother  Comments:  2010 3:29 PM - Jaxon Renard OLEARY  Oral  Brother ()  Comments:  2010 3:29 PM - Jaxon Renard OLEARY  colon  Brother ()  Leukemia  Sister (Adrianna, )  Lymphoma  Sister  Rheumatoid arthritis  Brother  TB - Tuberculosis  Father ()  Mother ()  HTN - Hypertension  Brother ()  Sister (Adrianna, )  Sister  Brother ()     Procedure history:    History of inferior vena cava filter placement (3791082727) on 12/10/2017 at 82 Years.  Mitral valve clip (7590644615) on 2017 at 82 Years.  Comments:  2018 1:16 PM - Blossom Varela  Complicated by air embolization and intermedius vein graft  PCI - Percutaneous coronary intervention (0602742069) on 2017 at 82 Years.  Comments:  2018 1:18 PM - Blossom Varela  via left femoral artery with coronary angiogram.  Intraaortic balloon pump placed and later removed.  Coronary angiogram (70564185) on 2017 at 81 Years.  Extracapsular cataract extraction and insertion of intraocular lens (723791681) on 2017 at 81 Years.  Comments:  2017 12:37 PM - Dalila Rondon  Right.  Extracapsular cataract extraction and insertion of intraocular lens (738505314) on 2017 at 81 Years.  Comments:  2017 1:44 PM - Dalila Rondon  Left.  Coronary angiogram (90413665) on 3/6/2017 at 81 Years.  Repair of recurrent ventral hernia w/mesh (879730615) on 2012 at 76 Years.  Repair of epigastric hernia (589053922) on 3/15/2012 at 76 Years.  right shoulder rotator cuff repair in the month of 2010 at 74 Years.  Comments:  2010 3:06 PM - RENARD GORMAN  R  Colonoscopy (213386092) in the month of 3/2009 at 73 Years.  Comments:  2010 3:02 PM - HUPPERT , RENARD  Normal  CABG x 2 - Coronary artery bypass grafts x 2 (949148870) in the month of 2007 at 72  Years.  laser for photocoagulation in the month of 3/2007 at 71 Years.  EGD in the month of 1/2003 at 67 Years.  Flexible sigmoidoscopy (18796558) on 10/12/2000 at 65 Years.  Flexible sigmoidoscopy (68779855) on 10/19/1993 at 58 Years.  right shoulder decompression in 1989 at 54 Years.  Wrist Fracture in 1987 at 52 Years.  Comments:  8/18/2010 3:07 PM - RENARD GORMAN  Compression fracutre of foot in 1978 at 43 Years.  L ankle compression in 1975 at 40 Years.  Bilateral vasectomy (724038496) in 1975 at 40 Years.  Back (954133588) in 1975 at 40 Years.  Comments:  1/18/2018 10:26 AM - Thuy Hopkins  Lumbar and thoracic.    1/18/2018 10:23 AM - Thuy Hopkins  History of spine surgery.  Rotator cuff repair (774955012) in 1964 at 29 Years.  Comments:  2/24/2012 10:25 AM - Berhane Krause PA-C.  left shoulder  Hernia repair (40106527).  Comments:  8/18/2010 3:05 PM - RENARD GORMAN  Bilateral, Laparoscopy  vitreoretinal surgery.  ORIF - Open reduction of fracture of ankle with internal fixation (633160934764470).  Comments:  1/18/2018 10:11 AM - Thuy Hopkins   Social History:        Alcohol Assessment: Current            Current                     Comments:                      08/18/2010 - Renard Coates CMA                     rare      Tobacco Assessment: Denies Tobacco Use      Substance Abuse Assessment: Denies Substance Abuse      Home and Environment Assessment            Marital status: .       Has a  history of anemia.  Has a history of DVT or pulmonary embolism.  Has a  personal history of bleeding problems.   Has no personal history of anesthesia reactions.  Has  no  personal history of sleep apnea  Patient does not have active tuberculosis.    S/he has  taken aspirin or aspirin-containing products in the last week.     S/he has not taken Plavix (Clopidogrel) in the last 2 weeks.    S/he has  taken warfarin in the past week.    S/he has been on corticosteroids for more than 2 weeks recently.   S/he  is DNR before, during or after surgery.          Chest pain / SOB walking up 2 flights of steps? No  Pain in neck or jaw? No  CAD MI?  Yes  Afib? No  Heart Failure? Yes  Asthma  or Bronchitis? No  Diabetes? No       Insulin/Orals?   Seizure Disorder? No  CKD? No  Thyroid Disease? No  Liver Disease? No  CVA? Yes      Physical Examination   Vital Signs   3/6/2018 9:54 AM CST Temperature Tympanic 97.6 DegF  LOW    Peripheral Pulse Rate 60 bpm    Pulse Site Radial artery    Respiratory Rate 16 br/min    Systolic Blood Pressure 134 mmHg  HI    Diastolic Blood Pressure 76 mmHg    Mean Arterial Pressure 95 mmHg    BP Site Right arm    Oxygen Saturation 97 %      Measurements from flowsheet : Measurements   3/6/2018 9:54 AM CST Height Measured - Standard 68 in    Weight Measured - Standard 199 lb    BSA 2.08 m2    Body Mass Index 30.25 kg/m2  HI      General:  No acute distress.    Eye:  Pupils are equal, round and reactive to light, Extraocular movements are intact.    HENT:  Tympanic membranes are clear, No pharyngeal erythema, No sinus tenderness.    Neck:  Supple, Non-tender, No lymphadenopathy.    Respiratory:  Lungs are clear to auscultation.    Cardiovascular:  Normal rate, Regular rhythm, grade 3/6 holosystolic murmur, +2 edema of right lower leg.    Gastrointestinal:  Soft, Non-tender, Non-distended.    Musculoskeletal:  Normal range of motion.    Neurologic:  Cranial Nerves II-XII are grossly intact, Normal deep tendon reflexes.    Psychiatric:  Appropriate mood & affect.       Review / Management   ECG interpretation:  no change from 1/05/2018, read by Dr Cardoso.       Impression and Plan   Diagnosis     Pre-operative examination (NIQ95-WQ Z01.818).     Presence of IVC filter (BXD92-CW Z95.828).     Embolic cerebral infarction (VYD88-XK I63.40).     History of deep venous thrombosis (SCQ36-GN Z86.718).     Nonrheumatic mitral valve regurgitation (MEL40-XJ I34.0).     S/P mitral valve clip implantation  (SMV92-LH Z98.890).     Orders     Orders   Lab (Gen Lab  Reference Lab):  Prothrombin time-INR* ([facility]) (Order): Specimen Type: Blood, Collection Date: 3/6/2018 10:21 AM CST  Hemoglobin* ([facility]) (Order): Specimen Type: Blood, Collection Date: 3/6/2018 10:21 AM CST  Creatinine* ([facility]) (Order): Specimen Type: Serum, Collection Date: 3/6/2018 10:21 AM CST  Requests (Lab):  Factor X activity, chromogenic  test code 25365 (Order): Factor X activity, chromogenic  test code 38382.     Orders   Charges (Evaluation and Management):  24572 office outpatient visit 25 minutes (Charge) (Order): Quantity: 1, Pre-operative examination  History of deep venous thrombosis  S/P mitral valve clip implantation  Nonrheumatic mitral valve regurgitation  Presence of IVC filter.     Approved for surgery without restrictions.

## 2022-02-15 NOTE — TELEPHONE ENCOUNTER
---------------------  From: Dawna Lucas CMA   Sent: 3/26/2020 11:17:48 AM CDT  Subject: ED FOLLOW UP       Sources of Information:  [X ] Patient, family member, or caregiver (Please list): Serenity- states patient still has fatigue but breathing has improved. [facility] will be working with patient.  [ ] Hospital discharge summary  [ ] Hospital fax  [ ] List of recent hospitalizations or ED visits  [ ] Other:     Discharged From: ED [facility]  Discharge Date: 3/25/2020    Diagnosis/Problem:    Acute on chronic congestive heart failure, unspecified heart failure type (HC) I50.9        Medication Changes: [ ] Yes [ X] No   Medication List Updated: [ ] Yes [X ] No    Needs Referral or Lab: [ ] Yes [ X] No    Needs Follow-up Appointment:  [ ] Within 7 days of discharge (highly complex visit)  [ ] Within 14 days of discharge (moderately complex visit)    Appointment Made With: n/a  Date:    Additional Information Needed and Requested:  [ ] Yes:  [X ] No

## 2022-02-15 NOTE — RESULTS
Anticoagulation Therapy Entered On:  12/27/2019 4:21 PM CST    Performed On:  12/27/2019 4:18 PM CST by Karena rFeeman RN               Warfarin Management   Week 1 Sunday Dose :   1.25    Week 1 Monday Dose :   2.5    Week 1 Tuesday Dose :   1.25    Week 1 Wednesday Dose :   2.5    Week 1 Thursday Dose :   1.25    Week 1 Friday Dose :   1.25    Week 1 Saturday Dose :   1.25    Week 1 Dose Total :   11.25    Planned Duration :   Indefinite   Indication :   Stroke   Warfarin Management Comments :   [facility]  [address], [address]  [phone]  Capillary Specimen     International Normalization Ratio :   1.3    INR Range :   2.0 - 3.0   INR Therapeutic Range :   No   Warfarin Abnormal Findings :   forgot dose Wed. Thurs this week   Karena Freeman RN - 12/27/2019 4:19 PM CST   Warfarin Management and Results Grid   Signs of Thrombolic :   No   Signs of Warfarin Intolerance :   No   Changes in Diet or Alcohol Intake :   No   Changes in Medication or Antibiotics :   No   Unusual Bleeding or Bruising :   No   Rectal Bleeding or Black Stools :   No   Karena Freeman RN - 12/27/2019 4:19 PM CST   Anticoagulation Recheck :   1 week   Warfarin Physician :   Berhane Krause PA-C   Warfarin Special Instructions :   INR = 1.3 per POC Patient is to stay on 2.5mg warfarin on Mon and Wed and 1.25mg the rest of the days of the week recheck INR in 1 week per DG. Patient and daughter advised in office.   Karena Freeman RN - 12/27/2019 4:19 PM CST

## 2022-02-15 NOTE — RESULTS
Anticoagulation Therapy Entered On:  1/9/2020 12:20 PM CST    Performed On:  1/9/2020 12:19 PM CST by Darlene Thomas RN               Warfarin Management   Week 1 Sunday Dose :   1.25    Week 1 Monday Dose :   2.5    Week 1 Tuesday Dose :   1.25    Week 1 Wednesday Dose :   2.5    Week 1 Thursday Dose :   2.5    Week 1 Friday Dose :   1.25    Week 1 Saturday Dose :   2.5    Week 1 Dose Total :   13.75    Week 2 Sunday Dose :   1.25    Week 2 Monday Dose :   2.5    Week 2 Tuesday Dose :   1.25    Week 2 Wednesday Dose :   2.5    Week 2 Thursday Dose :   1.25    Week 2 Friday Dose :   2.5    Week 2 Saturday Dose :   1.25    Week 2 Dose Total :   12.5    Planned Duration :   Indefinite   Indication :   Stroke   Warfarin Management Comments :   Lab test performed by:  [facility]  [address] [place] [address]  Phone # [phone]  Fax# [phone]  Capillary   International Normalization Ratio :   1.4    INR Range :   2.0 - 3.0   Warfarin Abnormal Findings :   Nose bleeds   Anticoagulation Recheck :   1 week   Warfarin Special Instructions :   Take Warfarin 2.5mg alternating with 1.25mg. Recheck INR in 1 week per DWG. Patient advised at office visit.    Darlene Thomas RN - 1/9/2020 12:19 PM CST

## 2022-02-15 NOTE — NURSING NOTE
Comprehensive Intake Entered On:  1/9/2020 11:34 AM CST    Performed On:  1/9/2020 11:20 AM CST by Vishnu Funes CMA               Summary   Chief Complaint :   Pt here for Post Op/Hospital visit for Mitral Clip placement done in December.  Pt is c/o nose bleeds since starting warfarin   Weight Measured :   187 lb(Converted to: 187 lb 0 oz, 84.82 kg)    Height Measured :   68 in(Converted to: 5 ft 8 in, 172.72 cm)    Body Mass Index :   28.43 kg/m2 (HI)    Body Surface Area :   2.02 m2   Systolic Blood Pressure :   128 mmHg   Diastolic Blood Pressure :   72 mmHg   Mean Arterial Pressure :   91 mmHg   Peripheral Pulse Rate :   88 bpm   BP Site :   Right arm   Pulse Site :   Radial artery   Temperature Tympanic :   96.4 DegF(Converted to: 35.8 DegC)  (LOW)    Respiratory Rate :   20 br/min   Oxygen Saturation :   96 %   Vishnu Funes CMA - 1/9/2020 11:20 AM CST   Health Status   Allergies Verified? :   Yes   Medication History Verified? :   Yes   Medical History Verified? :   Yes   Pre-Visit Planning Status :   Completed   Tobacco Use? :   Never smoker   Vishnu Funes CMA - 1/9/2020 11:20 AM CST   Meds / Allergies   (As Of: 1/9/2020 12:29:55 PM CST)   Allergies (Active)   Narcotics  Estimated Onset Date:   Unspecified ; Reactions:   severe constipation ; Created By:   Schoen RN, Alissa; Reaction Status:   Active ; Category:   Drug ; Substance:   Narcotics ; Type:   Allergy ; Updated By:   Schoen RN, Alissa; Reviewed Date:   1/9/2020 11:35 AM CST      Vicodin  Estimated Onset Date:   <not entered> 2011 ; Reactions:   Urinary retention ; Created By:   Berhane Krause PA-C; Reaction Status:   Active ; Category:   Drug ; Substance:   Vicodin ; Type:   Allergy ; Updated By:   Berhane Krause PA-C; Reviewed Date:   1/9/2020 11:35 AM CST        Medication List   (As Of: 1/9/2020 12:29:55 PM CST)   Prescription/Discharge Order    hydrALAZINE  :   hydrALAZINE ; Status:   Prescribed ; Ordered As Mnemonic:   hydrALAZINE 10 mg  oral tablet ; Simple Display Line:   10 mg, 1 tab(s), Oral, q8 hrs, 90 tab(s), 3 Refill(s) ; Ordering Provider:   Berhane Krause PA-C; Catalog Code:   hydrALAZINE ; Order Dt/Tm:   1/9/2020 11:49:14 AM CST          metoprolol  :   metoprolol ; Status:   Prescribed ; Ordered As Mnemonic:   metoprolol succinate 50 mg oral tablet, extended release ; Simple Display Line:   50 mg, 1 tab(s), PO, Daily, 90 tab(s), 3 Refill(s) ; Ordering Provider:   Berhane Krause PA-C; Catalog Code:   metoprolol ; Order Dt/Tm:   1/9/2020 11:49:05 AM CST          metoprolol  :   metoprolol ; Status:   Completed ; Ordered As Mnemonic:   metoprolol succinate 50 mg oral tablet, extended release ; Simple Display Line:   50 mg, 1 tab(s), PO, Daily, 90 tab(s), 3 Refill(s) ; Ordering Provider:   Berhane Krause PA-C; Catalog Code:   metoprolol ; Order Dt/Tm:   1/15/2019 10:48:42 AM CST          aspirin  :   aspirin ; Status:   Prescribed ; Ordered As Mnemonic:   aspirin 81 mg oral tablet ; Simple Display Line:   162 mg, 2 tab(s), po, daily, 100 tab(s), 0 Refill(s) ; Ordering Provider:   Berhane Krause PA-C; Catalog Code:   aspirin ; Order Dt/Tm:   9/2/2011 3:37:00 PM CDT          warfarin  :   warfarin ; Status:   Prescribed ; Ordered As Mnemonic:   warfarin 2.5 mg oral tablet ; Simple Display Line:   See Instructions, TAKE 1 TABLET BY MOUTH EVERY OTHER DAY ALTERNATE WITH TAKE 0.5 TABLET EVERY OTHER DAY, 135 tab(s), 1 Refill(s) ; Ordering Provider:   Berhane Krause PA-C; Catalog Code:   warfarin ; Order Dt/Tm:   12/10/2019 8:14:15 PM CST          tamsulosin 0.4 mg oral capsule  :   tamsulosin 0.4 mg oral capsule ; Status:   Prescribed ; Ordered As Mnemonic:   tamsulosin 0.4 mg oral capsule ; Simple Display Line:   1 cap(s), Oral, daily, 270 unknown unit ; Ordering Provider:   Berhane Krause PA-C; Catalog Code:   tamsulosin ; Order Dt/Tm:   10/2/2019 1:08:15 PM CDT          finasteride  :   finasteride ; Status:   Prescribed ; Ordered As  Mnemonic:   finasteride 5 mg oral tablet ; Simple Display Line:   5 mg, 1 tab(s), PO, Daily, 90 tab(s), 3 Refill(s) ; Ordering Provider:   Berhane Krause PA-C; Catalog Code:   finasteride ; Order Dt/Tm:   1/15/2019 10:49:10 AM CST          FLUoxetine  :   FLUoxetine ; Status:   Prescribed ; Ordered As Mnemonic:   FLUoxetine 20 mg oral capsule ; Simple Display Line:   40 mg, 2 cap(s), po, daily, 90 cap(s), 3 Refill(s) ; Ordering Provider:   Dr Danika Esposito; Catalog Code:   FLUoxetine ; Order Dt/Tm:   1/15/2019 10:48:58 AM CST          ferrous gluconate  :   ferrous gluconate ; Status:   Prescribed ; Ordered As Mnemonic:   ferrous gluconate 324 mg (37.5 mg elemental iron) oral tablet ; Simple Display Line:   324 mg, 1 tab(s), po, daily, 90 tab(s), 3 Refill(s) ; Ordering Provider:   Berhane Krause PA-C; Catalog Code:   ferrous gluconate ; Order Dt/Tm:   3/19/2018 8:09:39 AM CDT            Home Meds    hydrALAZINE  :   hydrALAZINE ; Status:   Discontinued ; Ordered As Mnemonic:   hydrALAZINE 10 mg oral tablet ; Simple Display Line:   10 mg, 1 tab(s), Oral, q8 hrs, 0 Refill(s) ; Ordering Provider:   Berhane Krause PA-C; Catalog Code:   hydrALAZINE ; Order Dt/Tm:   1/8/2020 1:23:49 PM CST          enoxaparin  :   enoxaparin ; Status:   Completed ; Ordered As Mnemonic:   Lovenox ; Simple Display Line:   0 Refill(s) ; Catalog Code:   enoxaparin ; Order Dt/Tm:   9/11/2019 2:23:53 PM CDT          doxylamine  :   doxylamine ; Status:   Completed ; Ordered As Mnemonic:   doxylamine ; Simple Display Line:   25 mg, Oral, daily, 0 Refill(s) ; Catalog Code:   doxylamine ; Order Dt/Tm:   9/11/2019 9:08:36 AM CDT          furosemide  :   furosemide ; Status:   Completed ; Ordered As Mnemonic:   furosemide 40 mg oral tablet ; Simple Display Line:   See Instructions, Take 80mg in the AM and 80mg in the afternoon  Per cardio from 7/3/19, 0 Refill(s) ; Catalog Code:   furosemide ; Order Dt/Tm:   7/8/2019 8:34:49 AM CDT           atorvastatin  :   atorvastatin ; Status:   Completed ; Ordered As Mnemonic:   atorvastatin 40 mg oral tablet ; Simple Display Line:   40 mg, 1 tab(s), Oral, daily, Per Cardio note 8/8/2018, 0 Refill(s) ; Catalog Code:   atorvastatin ; Order Dt/Tm:   8/24/2018 2:27:27 PM CDT          isosorbide dinitrate  :   isosorbide dinitrate ; Status:   Documented ; Ordered As Mnemonic:   isosorbide dinitrate 10 mg oral tablet ; Simple Display Line:   10 mg, 1 tab(s), Oral, bid, 120 tab(s), 0 Refill(s) ; Catalog Code:   isosorbide dinitrate ; Order Dt/Tm:   1/8/2020 1:25:52 PM CST          bumetanide  :   bumetanide ; Status:   Documented ; Ordered As Mnemonic:   bumetanide 2 mg oral tablet ; Simple Display Line:   2 mg, 1 tab(s), Oral, daily, 30 tab(s), 0 Refill(s) ; Catalog Code:   bumetanide ; Order Dt/Tm:   1/8/2020 1:22:15 PM CST          docusate  :   docusate ; Status:   Documented ; Ordered As Mnemonic:   docusate sodium 100 mg oral capsule ; Simple Display Line:   100 mg, 1 cap(s), Oral, qhs, PRN: for constipation, 20 cap(s), 0 Refill(s) ; Catalog Code:   docusate ; Order Dt/Tm:   1/8/2020 1:22:57 PM CST          atorvastatin  :   atorvastatin ; Status:   Documented ; Ordered As Mnemonic:   atorvastatin 80 mg oral tablet ; Simple Display Line:   80 mg, 1 tab(s), Oral, daily, 30 tab(s), 0 Refill(s) ; Catalog Code:   atorvastatin ; Order Dt/Tm:   1/8/2020 1:21:50 PM CST          clopidogrel  :   clopidogrel ; Status:   Documented ; Ordered As Mnemonic:   clopidogrel 75 mg oral tablet ; Simple Display Line:   75 mg, 1 tab(s), Oral, daily, 30 tab(s), 0 Refill(s) ; Catalog Code:   clopidogrel ; Order Dt/Tm:   1/8/2020 1:21:13 PM CST          potassium chloride  :   potassium chloride ; Status:   Documented ; Ordered As Mnemonic:   potassium chloride 20 mEq oral tablet, extended release ; Simple Display Line:   20 mEq, 1 tab(s), Oral, bid, 60 tab(s), 0 Refill(s) ; Catalog Code:   potassium chloride ; Order Dt/Tm:    8/16/2019 9:27:40 AM CDT          losartan  :   losartan ; Status:   Documented ; Ordered As Mnemonic:   losartan 50 mg oral tablet ; Simple Display Line:   50 mg, 1 tab(s), Oral, daily, 30 tab(s), 0 Refill(s) ; Catalog Code:   losartan ; Order Dt/Tm:   8/16/2019 9:20:37 AM CDT          amLODIPine  :   amLODIPine ; Status:   Processing ; Ordered As Mnemonic:   amLODIPine 5 mg oral tablet ; Action Display:   Complete ; Catalog Code:   amLODIPine ; Order Dt/Tm:   1/9/2020 12:29:03 PM CST          acetaminophen  :   acetaminophen ; Status:   Documented ; Ordered As Mnemonic:   acetaminophen 325 mg oral capsule ; Simple Display Line:   325 mg, 1 cap(s), po, tid, 0 Refill(s) ; Catalog Code:   acetaminophen ; Order Dt/Tm:   1/18/2018 10:13:21 AM CST            Social History   Social History   (As Of: 1/9/2020 11:34:09 AM CST)   Alcohol:  Current      Current   Comments:  8/18/2010 2:59 PM - Olivia Coates CMA: rare   (Last Updated: 8/18/2010 2:59:42 PM CDT by Olivia Coates CMA)          Tobacco:  Denies Tobacco Use      (Last Updated: 8/18/2010 2:59:53 PM CDT by Olivia Coates CMA )         Substance Abuse:  Denies Substance Abuse      (Last Updated: 3/14/2012 3:14:16 PM CDT by Adrian Argueta MD )         Home/Environment:        Marital status: .   (Last Updated: 9/5/2017 2:18:47 PM CDT by Claudia Larson MA)

## 2022-02-15 NOTE — TELEPHONE ENCOUNTER
---------------------  From: Jennifer Garcia CMA   To: Jerry Loza MD;     Sent: 2/28/2020 1:35:37 PM CST  Subject: [place]     Please advise as [facility] is out of clinic.    PCP:   [id number]      Time of Call:  1305       Person Calling:  nurse lucy Layne/ Mitch  Phone number:  [phone]    Note:   Nurse called requesting patients last Hgb level, done 2/25/20 per DAVIDG. She is also requesting a verbal order for a  to evaluate and treat. Please advise.    Last office visit and reason:  1/9/2020 Hosp f/u w/ [facility]---------------------  From: Jerry Loza MD   To: Jennifer Garcia CMA;     Sent: 2/28/2020 2:36:32 PM CST  Subject: RE: Mitch     ok for bothNurse notified and given verbal order. She will contact daughter.

## 2022-02-15 NOTE — NURSING NOTE
"Comprehensive Intake Entered On:  2/5/2019 9:09 AM CST    Performed On:  2/5/2019 8:59 AM CST by Vishnu Funes CMA               Summary   Chief Complaint :   Pt here stating he needs \"lab work for his cardiologist\" Pt also c/o all over body pain because he states \"I might of overdid it cutting wood yesterday\"   Weight Measured :   197 lb(Converted to: 197 lb 0 oz, 89.36 kg)    Height Measured :   68 in(Converted to: 5 ft 8 in, 172.72 cm)    Body Mass Index :   29.95 kg/m2 (HI)    Body Surface Area :   2.07 m2   Systolic Blood Pressure :   152 mmHg (HI)    Diastolic Blood Pressure :   78 mmHg   Mean Arterial Pressure :   103 mmHg   Peripheral Pulse Rate :   72 bpm   BP Site :   Right arm   Pulse Site :   Radial artery   Temperature Tympanic :   98.2 DegF(Converted to: 36.8 DegC)    Respiratory Rate :   16 br/min   Oxygen Saturation :   97 %   Vishnu Funes CMA - 2/5/2019 8:59 AM CST   Health Status   Allergies Verified? :   Yes   Medication History Verified? :   Yes   Medical History Verified? :   Yes   Pre-Visit Planning Status :   Completed   Tobacco Use? :   Never smoker   Vishnu Funes CMA - 2/5/2019 8:59 AM CST   Meds / Allergies   (As Of: 2/5/2019 9:09:36 AM CST)   Allergies (Active)   Narcotics  Estimated Onset Date:   Unspecified ; Reactions:   severe constipation ; Created By:   Schoen RN, Alissa; Reaction Status:   Active ; Category:   Drug ; Substance:   Narcotics ; Type:   Allergy ; Updated By:   Schoen RN, Alissa; Reviewed Date:   2/5/2019 9:05 AM CST      Vicodin  Estimated Onset Date:   <not entered> 2011 ; Reactions:   Urinary retention ; Created By:   Berhane Krause PA-C; Reaction Status:   Active ; Category:   Drug ; Substance:   Vicodin ; Type:   Allergy ; Updated By:   Berhane Krause PA-C; Reviewed Date:   2/5/2019 9:05 AM CST        Medication List   (As Of: 2/5/2019 9:09:36 AM CST)   Prescription/Discharge Order    furosemide  :   furosemide ; Status:   Processing ; Ordered As Mnemonic:   " furosemide 40 mg oral tablet ; Simple Display Line:   See Instructions, 1.5 tabs(60 mg) PO daily., 135 tab(s), 1 Refill(s) ; Ordering Provider:   Berhane Krause PA-C; Action Display:   Modify ; Catalog Code:   furosemide ; Order Dt/Tm:   2/5/2019 9:03:50 AM          tamsulosin  :   tamsulosin ; Status:   Prescribed ; Ordered As Mnemonic:   tamsulosin 0.4 mg oral capsule ; Simple Display Line:   0.4 mg, 1 cap(s), po, daily, 90 cap(s), 3 Refill(s) ; Ordering Provider:   Berhane Krause PA-C; Catalog Code:   tamsulosin ; Order Dt/Tm:   1/15/2019 10:52:54 AM          finasteride  :   finasteride ; Status:   Prescribed ; Ordered As Mnemonic:   finasteride 5 mg oral tablet ; Simple Display Line:   5 mg, 1 tab(s), PO, Daily, 90 tab(s), 3 Refill(s) ; Ordering Provider:   Berhane Krause PA-C; Catalog Code:   finasteride ; Order Dt/Tm:   1/15/2019 10:49:10 AM          FLUoxetine  :   FLUoxetine ; Status:   Prescribed ; Ordered As Mnemonic:   FLUoxetine 20 mg oral capsule ; Simple Display Line:   20 mg, 1 cap(s), po, daily, 90 cap(s), 3 Refill(s) ; Ordering Provider:   Berhane Krause PA-C; Catalog Code:   FLUoxetine ; Order Dt/Tm:   1/15/2019 10:48:58 AM          metoprolol  :   metoprolol ; Status:   Prescribed ; Ordered As Mnemonic:   metoprolol succinate 50 mg oral tablet, extended release ; Simple Display Line:   50 mg, 1 tab(s), PO, Daily, 90 tab(s), 3 Refill(s) ; Ordering Provider:   Berhane Krause PA-C; Catalog Code:   metoprolol ; Order Dt/Tm:   1/15/2019 10:48:42 AM          losartan  :   losartan ; Status:   Prescribed ; Ordered As Mnemonic:   losartan 25 mg oral tablet ; Simple Display Line:   25 mg, 1 tab(s), PO, Daily, 90 tab(s), 3 Refill(s) ; Ordering Provider:   Berhane Krause PA-C; Catalog Code:   losartan ; Order Dt/Tm:   3/19/2018 8:12:48 AM          ferrous gluconate  :   ferrous gluconate ; Status:   Prescribed ; Ordered As Mnemonic:   ferrous gluconate 324 mg (37.5 mg elemental iron) oral tablet ;  Simple Display Line:   324 mg, 1 tab(s), po, daily, 90 tab(s), 3 Refill(s) ; Ordering Provider:   Berhane Krause PA-C; Catalog Code:   ferrous gluconate ; Order Dt/Tm:   3/19/2018 8:09:39 AM          aspirin  :   aspirin ; Status:   Prescribed ; Ordered As Mnemonic:   aspirin 81 mg oral tablet ; Simple Display Line:   162 mg, 2 tab(s), po, daily, 100 tab(s), 0 Refill(s) ; Ordering Provider:   Berhane Krause PA-C; Catalog Code:   aspirin ; Order Dt/Tm:   9/2/2011 3:37:00 PM            Home Meds    potassium chloride  :   potassium chloride ; Status:   Suspended ; Ordered As Mnemonic:   potassium chloride ; Simple Display Line:   10 mEq, daily ; Catalog Code:   potassium chloride ; Order Dt/Tm:   8/18/2010 2:52:13 PM ; Comment:   every other day          atorvastatin  :   atorvastatin ; Status:   Documented ; Ordered As Mnemonic:   atorvastatin 40 mg oral tablet ; Simple Display Line:   40 mg, 1 tab(s), Oral, daily, Per Cardio note 8/8/2018, 0 Refill(s) ; Catalog Code:   atorvastatin ; Order Dt/Tm:   8/24/2018 2:27:27 PM          amLODIPine  :   amLODIPine ; Status:   Documented ; Ordered As Mnemonic:   amLODIPine 5 mg oral tablet ; Simple Display Line:   5 mg, 1 tab(s), PO, Daily, 30 tab(s), 0 Refill(s) ; Catalog Code:   amLODIPine ; Order Dt/Tm:   5/18/2018 9:02:55 AM          acetaminophen  :   acetaminophen ; Status:   Documented ; Ordered As Mnemonic:   acetaminophen 325 mg oral capsule ; Simple Display Line:   325 mg, 1 cap(s), po, tid, 0 Refill(s) ; Catalog Code:   acetaminophen ; Order Dt/Tm:   1/18/2018 10:13:21 AM            Social History   Social History   (As Of: 2/5/2019 9:09:36 AM CST)   Alcohol:  Current      Current   Comments:  8/18/2010 2:59 PM - Olivia Coates CMA: rare   (Last Updated: 8/18/2010 2:59:42 PM CDT by Olivia Coates CMA)          Tobacco:  Denies Tobacco Use      (Last Updated: 8/18/2010 2:59:53 PM CDT by Olivia Coates CMA )         Substance Abuse:  Denies Substance Abuse      (Last  Updated: 3/14/2012 3:14:16 PM CDT by Adrian Argueta MD )         Home and Environment:        Marital status: .   (Last Updated: 9/5/2017 2:18:47 PM CDT by Marsha PALMER, Claudia)            More Vitals   Systolic Blood Pressure Repeat :   132 mmHg   Diastolic Blood Pressure Repeat :   74 mmHg   Sunita Rey CMA - 2/5/2019 9:35 AM CST

## 2022-02-15 NOTE — NURSING NOTE
Comprehensive Intake Entered On:  9/26/2019 10:11 AM CDT    Performed On:  9/26/2019 10:02 AM CDT by Vishnu Funes CMA               Summary   Chief Complaint :   Pt here for Right knee pain and edema from 9/5.  Pt states knee is feeling more painful.   Weight Measured :   190 lb(Converted to: 190 lb 0 oz, 86.18 kg)    Height Measured :   68 in(Converted to: 5 ft 8 in, 172.72 cm)    Body Mass Index :   28.89 kg/m2 (HI)    Body Surface Area :   2.03 m2   Systolic Blood Pressure :   146 mmHg (HI)    Diastolic Blood Pressure :   94 mmHg (HI)    Mean Arterial Pressure :   111 mmHg   Peripheral Pulse Rate :   98 bpm   BP Site :   Right arm   Pulse Site :   Radial artery   Temperature Tympanic :   97.1 DegF(Converted to: 36.2 DegC)  (LOW)    Respiratory Rate :   16 br/min   Oxygen Saturation :   99 %   Vishnu Funes CMA - 9/26/2019 10:02 AM CDT   Health Status   Allergies Verified? :   Yes   Medication History Verified? :   Yes   Medical History Verified? :   Yes   Pre-Visit Planning Status :   Completed   Tobacco Use? :   Never smoker   Vishnu Funes CMA - 9/26/2019 10:02 AM CDT   Meds / Allergies   (As Of: 9/26/2019 10:11:21 AM CDT)   Allergies (Active)   Narcotics  Estimated Onset Date:   Unspecified ; Reactions:   severe constipation ; Created By:   Schoen RN, Alissa; Reaction Status:   Active ; Category:   Drug ; Substance:   Narcotics ; Type:   Allergy ; Updated By:   Schoen RN, Alissa; Reviewed Date:   9/26/2019 10:07 AM CDT      Vicodin  Estimated Onset Date:   <not entered> 2011 ; Reactions:   Urinary retention ; Created By:   Berhane Krause PA-C; Reaction Status:   Active ; Category:   Drug ; Substance:   Vicodin ; Type:   Allergy ; Updated By:   Berhane Krause PA-C; Reviewed Date:   9/26/2019 10:07 AM CDT        Medication List   (As Of: 9/26/2019 10:11:21 AM CDT)   Prescription/Discharge Order    warfarin  :   warfarin ; Status:   Prescribed ; Ordered As Mnemonic:   warfarin 2.5 mg oral tablet ; Simple  Display Line:   See Instructions, take 2.5mg ( 1 tab) QOD alternate with 1.25 mg (1/2 tab) QOD, 30 tab(s), 0 Refill(s) ; Ordering Provider:   Berhane Krause PA-C; Catalog Code:   warfarin ; Order Dt/Tm:   9/20/2019 4:45:04 PM          tamsulosin  :   tamsulosin ; Status:   Prescribed ; Ordered As Mnemonic:   tamsulosin 0.4 mg oral capsule ; Simple Display Line:   0.4 mg, 1 cap(s), po, daily, 90 cap(s), 3 Refill(s) ; Ordering Provider:   Berhane Krause PA-C; Catalog Code:   tamsulosin ; Order Dt/Tm:   1/15/2019 10:52:54 AM          finasteride  :   finasteride ; Status:   Prescribed ; Ordered As Mnemonic:   finasteride 5 mg oral tablet ; Simple Display Line:   5 mg, 1 tab(s), PO, Daily, 90 tab(s), 3 Refill(s) ; Ordering Provider:   Berhane Krause PA-C; Catalog Code:   finasteride ; Order Dt/Tm:   1/15/2019 10:49:10 AM          FLUoxetine  :   FLUoxetine ; Status:   Prescribed ; Ordered As Mnemonic:   FLUoxetine 20 mg oral capsule ; Simple Display Line:   40 mg, 2 cap(s), po, daily, 90 cap(s), 3 Refill(s) ; Ordering Provider:   Dr Danika Esposito; Catalog Code:   FLUoxetine ; Order Dt/Tm:   1/15/2019 10:48:58 AM          metoprolol  :   metoprolol ; Status:   Prescribed ; Ordered As Mnemonic:   metoprolol succinate 50 mg oral tablet, extended release ; Simple Display Line:   50 mg, 1 tab(s), PO, Daily, 90 tab(s), 3 Refill(s) ; Ordering Provider:   Berhane Krause PA-C; Catalog Code:   metoprolol ; Order Dt/Tm:   1/15/2019 10:48:42 AM          ferrous gluconate  :   ferrous gluconate ; Status:   Prescribed ; Ordered As Mnemonic:   ferrous gluconate 324 mg (37.5 mg elemental iron) oral tablet ; Simple Display Line:   324 mg, 1 tab(s), po, daily, 90 tab(s), 3 Refill(s) ; Ordering Provider:   Berhane Krause PA-C; Catalog Code:   ferrous gluconate ; Order Dt/Tm:   3/19/2018 8:09:39 AM          aspirin  :   aspirin ; Status:   Prescribed ; Ordered As Mnemonic:   aspirin 81 mg oral tablet ; Simple Display Line:   162  mg, 2 tab(s), po, daily, 100 tab(s), 0 Refill(s) ; Ordering Provider:   Berhane Krause PA-C; Catalog Code:   aspirin ; Order Dt/Tm:   9/2/2011 3:37:00 PM            Home Meds    enoxaparin  :   enoxaparin ; Status:   Documented ; Ordered As Mnemonic:   Lovenox ; Simple Display Line:   0 Refill(s) ; Catalog Code:   enoxaparin ; Order Dt/Tm:   9/11/2019 2:23:53 PM          doxylamine  :   doxylamine ; Status:   Documented ; Ordered As Mnemonic:   doxylamine ; Simple Display Line:   25 mg, Oral, daily, 0 Refill(s) ; Catalog Code:   doxylamine ; Order Dt/Tm:   9/11/2019 9:08:36 AM          potassium chloride  :   potassium chloride ; Status:   Documented ; Ordered As Mnemonic:   potassium chloride 20 mEq oral tablet, extended release ; Simple Display Line:   20 mEq, 1 tab(s), Oral, bid, 60 tab(s), 0 Refill(s) ; Catalog Code:   potassium chloride ; Order Dt/Tm:   8/16/2019 9:27:40 AM          losartan  :   losartan ; Status:   Documented ; Ordered As Mnemonic:   losartan 50 mg oral tablet ; Simple Display Line:   50 mg, 1 tab(s), Oral, daily, 30 tab(s), 0 Refill(s) ; Catalog Code:   losartan ; Order Dt/Tm:   8/16/2019 9:20:37 AM          furosemide  :   furosemide ; Status:   Documented ; Ordered As Mnemonic:   furosemide 40 mg oral tablet ; Simple Display Line:   See Instructions, Take 80mg in the AM and 80mg in the afternoon  Per cardio from 7/3/19, 0 Refill(s) ; Catalog Code:   furosemide ; Order Dt/Tm:   7/8/2019 8:34:49 AM          atorvastatin  :   atorvastatin ; Status:   Documented ; Ordered As Mnemonic:   atorvastatin 40 mg oral tablet ; Simple Display Line:   40 mg, 1 tab(s), Oral, daily, Per Cardio note 8/8/2018, 0 Refill(s) ; Catalog Code:   atorvastatin ; Order Dt/Tm:   8/24/2018 2:27:27 PM          amLODIPine  :   amLODIPine ; Status:   Documented ; Ordered As Mnemonic:   amLODIPine 5 mg oral tablet ; Simple Display Line:   5 mg, 1 tab(s), PO, Daily, 30 tab(s), 0 Refill(s) ; Catalog Code:   amLODIPine ;  Order Dt/Tm:   5/18/2018 9:02:55 AM          acetaminophen  :   acetaminophen ; Status:   Documented ; Ordered As Mnemonic:   acetaminophen 325 mg oral capsule ; Simple Display Line:   325 mg, 1 cap(s), po, tid, 0 Refill(s) ; Catalog Code:   acetaminophen ; Order Dt/Tm:   1/18/2018 10:13:21 AM            Social History   Social History   (As Of: 9/26/2019 10:11:21 AM CDT)   Alcohol:  Current      Current   Comments:  8/18/2010 2:59 PM - Olivia Coates CMA: rare   (Last Updated: 8/18/2010 2:59:42 PM CDT by Olivia Coates CMA)          Tobacco:  Denies Tobacco Use      (Last Updated: 8/18/2010 2:59:53 PM CDT by Olivia Coates CMA )         Substance Abuse:  Denies Substance Abuse      (Last Updated: 3/14/2012 3:14:16 PM CDT by Adrian Argueta MD )         Home/Environment:        Marital status: .   (Last Updated: 9/5/2017 2:18:47 PM CDT by Claudia Larson MA)

## 2022-02-15 NOTE — TELEPHONE ENCOUNTER
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 She is requesting current warfarin dose. She is advised per our records patient dosage 1.25mg QOD alt 2.5mg QOD.

## 2022-02-15 NOTE — PROGRESS NOTES
"   Patient:   PUMA BAEZ            MRN: [id number]            FIN: [id number]               Age:   82 years     Sex:  Male     :  1935   Associated Diagnoses:   Dizziness; Embolic cerebral infarction; HTN (hypertension); Scalp laceration   Author:   Jimi MG, Berhane CRISTOBAL      Visit Information   Accompanied by:  daughter.       Chief Complaint   2018 8:04 AM CDT    Pt here for FU on scalp laceration and staple removal from back of head.  Pt states he still having dizzy spells and feels \"foggy\"        History of Present Illness   Chief complaint and symptoms noted above and confirmed with patient   2018:  He had MitraClip place  but then developed MI followed by DVT followed by embolic stroke with left hemiparesis  he spent a month at [facility]  his daughter (Alma Delia) is living with him  they are arranging [facility] to come and do home therapy  he had swelling in his right lower leg today (it has been wrapped every day but not today)  he has some constipation due to iron supplement, he is advised to start Miralax      2018:  still having right leg edema,  is seeing accupuncture for this  he has fired home PT because he does not think he needs them  has seen [facility] for [facility] but he stopped going there because he did not think it was effective  he is wearing a compression stocking on that leg during the day  he has stopped following up with neurology because he did not think they were doing much for him  needs CBC and INR today  needs med refills  he has seen accupuncture for his left thumb weakness and it thinks it is improving     2018:  here today with his daughter  has been to accupuncture once  right LLE is still swollen, will be starting PT at [facility] soon  had follow up with neurology , no changes made  has appt 3/13 with interventional radiology to discuss removal of IVC filter        2018:  at last visit  3/29 he was having some bleeding gums and " blood in his stool, his warfarin was stopped and   he will be maintained on just 162 mg aspirin  he says that [facility] ([facility] or [facility]) are changing/adjusting his meds (no records are available)  his son-in-law is a pharmacist and has recommended that he stop his gabapentin, famotidine, atorvastatin or melatonin      this morning he tripped over the blade of his tractor and fell and hit his head causing a scalp laceration,  no LOC  a few days ago he was feeling SOB but not today, he had some weakness in his left side last week but that is better today  has completed PT but is still going to speech therapy      4/20/2018:  had scalp laceration on 4/16,  closed with staples, wound is healing well  he is still having some dizzy spells   he is having some SOB at home at rest but can walk without chest pain or SOB  he says his breathing feels better when walking      Review of Systems   Respiratory:  Shortness of breath.    Neurologic:  Dizziness.       Health Status   Allergies:    Allergic Reactions (Selected)  Severity Not Documented  Narcotics (Severe constipation)  Vicodin (Urinary retention)   Medications:  (Selected)   Prescriptions  Prescribed  FLUoxetine 20 mg oral capsule: 1 cap(s) ( 20 mg ), po, daily, # 90 cap(s), 3 Refill(s), Type: Maintenance  aspirin 81 mg oral tablet: 2 tab(s) ( 162 mg ), po, daily, # 100 tab(s), 0 Refill(s), Type: Maintenance, OTC (Rx)  ferrous gluconate 324 mg (37.5 mg elemental iron) oral tablet: 1 tab(s) ( 324 mg ), po, daily, # 90 tab(s), 3 Refill(s), Type: Maintenance  finasteride 5 mg oral tablet: 1 tab(s) ( 5 mg ), PO, Daily, # 90 tab(s), 3 Refill(s), Type: Maintenance  furosemide 40 mg oral tablet: 1 tab(s) ( 40 mg ), PO, bid, # 60 tab(s), 1 Refill(s), Type: Maintenance  losartan 25 mg oral tablet: 1 tab(s) ( 25 mg ), PO, Daily, # 90 tab(s), 3 Refill(s), Type: Maintenance  metoprolol succinate 50 mg oral tablet, extended release: 1 tab(s) ( 50 mg ), PO, Daily, #  90 tab(s), 3 Refill(s), Type: Maintenance  tamsulosin 0.4 mg oral capsule: 1 cap(s) ( 0.4 mg ), po, daily, # 90 cap(s), 3 Refill(s), Type: Maintenance  Documented Medications  Documented  Fish Oil: ( 1,000 mg ), po, cap(s), 0 Refill(s), Type: Maintenance  acetaminophen 325 mg oral capsule: 1 cap(s) ( 325 mg ), po, tid, 0 Refill(s), Type: Maintenance  clotrimazole 1% topical cream: 1 annmarie, top, bid, 0 Refill(s), Type: Maintenance  polyethylene glycol 3350 oral powder for reconstitution: ( 17 gm ), po, daily, 0 Refill(s), Type: Maintenance  Suspended  potassium chloride: ( 10 mEq ), daily, 0 Refill(s), Type: Maintenance   Problem list:    All Problems (Selected)  Embolic cerebral infarction / SNOMED CT 4733731703 / Confirmed  HTN (hypertension) / SNOMED CT 9379706465 / Confirmed  Bilateral inguinal hernia / SNOMED CT 927858985 / Confirmed  Hematoma / SNOMED CT 5539513124 / Confirmed  Abnormal glucose / SNOMED CT 769712465 / Confirmed  Latent tuberculosis / SNOMED CT 59189520 / Confirmed  Anemia, blood loss / SNOMED CT 6195251572 / Confirmed  Hernia, ventral / SNOMED CT 4064990698 / Confirmed  Obesity / SNOMED CT 5351434540 / Probable  History of deep venous thrombosis / SNOMED CT 1600080355 / Confirmed  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 / SNOMED CT 6368609610 / Confirmed  S/P mitral valve clip implantation / SNOMED CT 3155518900 / Confirmed  S/P mitral valve clip implantation / SNOMED CT 7067780649 / Confirmed  Insomnia / SNOMED CT 741269097 / Confirmed  History of MI (myocardial infarction) / SNOMED CT 2061151387 / Confirmed  Nonrheumatic mitral valve regurgitation / SNOMED CT 985420142 / Confirmed  Esophageal reflux / SNOMED CT 752387571 / Confirmed  Lumbar spondylosis / SNOMED CT 986482108 / Confirmed  Epistaxis / SNOMED CT 398609481 / Confirmed  Hyperplasia of prostate / SNOMED CT 778111072 / Confirmed  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 56905113 / Confirmed  Urinary retention /  "SNOMED CT 752866471 / Confirmed  Acute posthemorrhagic anemia / SNOMED CT 056871324 / Confirmed  Left hemiparesis / SNOMED CT 343500469 / Confirmed  Pain of right thumb / SNOMED CT 119862802 / Confirmed  Barretts esophagus / SNOMED CT 962953962 / Confirmed  Chronic alcohol use / SNOMED CT 514687659 / Confirmed  Hematuria / SNOMED CT 303990980 / Confirmed  Foot pain / SNOMED CT 051686259 / Confirmed  CHF (congestive heart failure) / SNOMED CT 36522434 / Confirmed  CAD (coronary artery disease) / SNOMED CT 56907595 / Confirmed  Hyperlipidemia / SNOMED CT 53742716 / Confirmed  Tuberculosis / SNOMED CT 66969465 / Confirmed      Histories   Past Medical History:    Active  Hematoma (2914734458): Onset on 12/15/2017 at 82 years.  Embolic cerebral infarction (4843837634): Onset on 12/10/2017 at 82 years.  CHF (congestive heart failure) (34852078): Onset in the month of 11/2008 at 73 years  History of MI (myocardial infarction) (2068812633): Onset in the month of 11/2007 at 72 years  Barretts esophagus (002912985): Onset in the month of 1/2003 at 67 years  Traumatic blindness of left eye (60535799): Onset in 1999 at 64 years.  Comments:  8/18/2010 CDT 2:58 PM CDT - Olivia Coates CMA  \"ruptured blood vessel\"  HTN (hypertension) (3008158779): Onset in 1989 at 54 years.  Latent tuberculosis (21164408)  CAD (coronary artery disease) (48556852)  Acute posthemorrhagic anemia (240739939)  Hyperlipidemia (66195084)  Epistaxis (975865281)  Comments:  1/18/2018 CST 10:33 AM Thuy Topete  Chronic  Abnormal glucose (986317390)  Foot pain (298360700)  Nonrheumatic mitral valve regurgitation (010516724)  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 (9989908648)  Hematuria (395990721)  History of deep venous thrombosis (3180307428)  Comments:  1/18/2018 CST 9:57 AM Thuy Topete  Right lower extremity.  Iliac vein.  Anemia, blood loss (4869675764)  Tuberculosis (38850559)  Comments:  1/18/2018 CST 10:02 AM CST - " Thuy Hopkins  Patient tests positive.  See family history.  Bilateral inguinal hernia (251089049)  Hernia, ventral (7953949677)  Hyperplasia of prostate (903941606)  Comments:  2018 CST 10:24 AM Thuy Topete  Benign  Insomnia (014519361)  Pain of right thumb (774783672)  Comments:  2018 CST 10:36 AM Thuy Topete  Chronic  Chronic alcohol use (929324105)  Esophageal reflux (237211406)  Lumbar spondylosis (226906462)  Resolved  Inpatient stay (821675773): Onset on 2017 at 82 years.  Resolved on 2018 at 82 years.  Comments:  2018 CST 1:09 PM Blossom Art  @[facility], \[facility] [place] [facility]\"", [place] - Embolic cerebral infarction    2018 CST 1:14 PM [facility]ossom [facility]  H/O herpes zoster (6454188940): Onset on 2009 at 73 years.  Resolved.  Age-related cataract of both eyes (979444289):  Resolved.  Acute kidney injury (38317428):  Resolved.  History of fracture of right ankle (2260034687):  Resolved.  Comments:  2018 CST 10:06 AM Thuy Topete  Right  Burst blood vessel in eye (590310872):  Resolved.  Comments:  2018 CST 10:05 AM Thuy Topete  Left  H/O fracture of wrist (6939843976):  Resolved.  Comments:  2018 CST 10:08 AM Thuy Topete  Right   Family History:    Cancer  Brother ()  Comments:  2010 3:29 PM - Jaxon CMAOlivia  Lung  Brother  Comments:  2010 3:29 PM - Jaxon MAMTA Olivia  Oral  Brother ()  Comments:  2010 3:29 PM - JaxonOlivia jeronimo CMA  colon  Brother ()  Leukemia  Sister (Adrianna, )  Lymphoma  Sister  Rheumatoid arthritis  Brother  TB - Tuberculosis  Father ()  Mother ()  HTN - Hypertension  Brother ()  Sister (Adrianna, )  Sister  Brother ()     Procedure history:    History of inferior vena cava filter placement (7181698064) on 12/10/2017 at 82 Years.  Mitral valve clip (3933651391) on 2017 at 82  Years.  Comments:  1/31/2018 1:16 PM - Blossom Varela  Complicated by air embolization and intermedius vein graft  PCI - Percutaneous coronary intervention (7677501330) on 12/7/2017 at 82 Years.  Comments:  1/31/2018 1:18 PM - Blossom Varela  via left femoral artery with coronary angiogram.  Intraaortic balloon pump placed and later removed.  Coronary angiogram (28602980) on 9/1/2017 at 81 Years.  Extracapsular cataract extraction and insertion of intraocular lens (955027821) on 7/6/2017 at 81 Years.  Comments:  7/25/2017 12:37 PM - Dalila Rondon  Right.  Extracapsular cataract extraction and insertion of intraocular lens (545712536) on 6/29/2017 at 81 Years.  Comments:  8/30/2017 1:44 PM - Dalila Rondon  Left.  Coronary angiogram (95218371) on 3/6/2017 at 81 Years.  Repair of recurrent ventral hernia w/mesh (327226516) on 8/21/2012 at 76 Years.  Repair of epigastric hernia (311761805) on 3/15/2012 at 76 Years.  right shoulder rotator cuff repair in the month of 8/2010 at 74 Years.  Comments:  8/18/2010 3:06 PM - RENARD GORMAN  Colonoscopy (879524722) in the month of 3/2009 at 73 Years.  Comments:  8/18/2010 3:02 PM - RENARD GORMAN  Normal  CABG x 2 - Coronary artery bypass grafts x 2 (043674520) in the month of 11/2007 at 72 Years.  laser for photocoagulation in the month of 3/2007 at 71 Years.  EGD in the month of 1/2003 at 67 Years.  Flexible sigmoidoscopy (59098047) on 10/12/2000 at 65 Years.  Flexible sigmoidoscopy (61644999) on 10/19/1993 at 58 Years.  right shoulder decompression in 1989 at 54 Years.  Wrist Fracture in 1987 at 52 Years.  Comments:  8/18/2010 3:07 PM - RENARD GORMAN  Compression fracutre of foot in 1978 at 43 Years.  L ankle compression in 1975 at 40 Years.  Bilateral vasectomy (117127004) in 1975 at 40 Years.  Back (573090367) in 1975 at 40 Years.  Comments:  1/18/2018 10:26 Thuy Boston  Lumbar and thoracic.    1/18/2018 10:23 JACOB - Thuy Hopkins  History of spine surgery.  Rotator  cuff repair (580567980) in 1964 at 29 Years.  Comments:  2/24/2012 10:25 AM - Berhane Krause PA-C.  left shoulder  Hernia repair (44639691).  Comments:  8/18/2010 3:05 PM - RENARD GORMAN  Bilateral, Laparoscopy  vitreoretinal surgery.  ORIF - Open reduction of fracture of ankle with internal fixation (276789959371026).  Comments:  1/18/2018 10:11 AM - Thuy Hopkins   Social History:        Alcohol Assessment: Current            Current                     Comments:                      08/18/2010 - Renard Coates CMA                     rare      Tobacco Assessment: Denies Tobacco Use      Substance Abuse Assessment: Denies Substance Abuse      Home and Environment Assessment            Marital status: .        Physical Examination   Vital Signs   4/20/2018 8:04 AM CDT Temperature Tympanic 97.5 DegF  LOW    Peripheral Pulse Rate 60 bpm    Pulse Site Radial artery    Respiratory Rate 24 br/min  HI    Systolic Blood Pressure 160 mmHg  HI    Diastolic Blood Pressure 108 mmHg  HI    Mean Arterial Pressure 125 mmHg    BP Site Right arm    Oxygen Saturation 97 %      Measurements from flowsheet : Measurements   4/20/2018 8:04 AM CDT Height Measured - Standard 68 in    Weight Measured - Standard 186 lb    BSA 2.01 m2    Body Mass Index 28.28 kg/m2  HI      General:  No acute distress.    Respiratory:  Lungs are clear to auscultation.    Cardiovascular:  Normal rate, Regular rhythm, No edema, grade 3/6 holosystolic murmur.    Integumentary:  scalp wound is healing well,  staples are easily removed, no skin gaping, slight seepage of blood.    Cognition and Speech:  Speech clear and coherent.    Psychiatric:  Appropriate mood & affect.       Impression and Plan   Diagnosis     Dizziness (DUF96-TV R42).     Embolic cerebral infarction (YZZ63-HE I63.40).     HTN (hypertension) (JTL92-YC I10).     Scalp laceration (DYM56-EU S01.01XD).     Summary:  scalp wound is well healed, no further treatment needed, continue to  monitor    his blood pressure is elevated, but he says at home it is always lower (in the 150 range)  he and his family will continue to monitor his BP at home and if it becomes 10 points higher  than his normal will come in to be evaluated  because of his dizziness will not increase or change any of his BP meds    will stop his tamsulosin, he is not having any urinary problems and this medication may be contributing to his dizziness    return for recheck in 2 months.    Orders     Orders   Requests (Return to Office):  Return to Office (Request) (Order): RFV: 2 months for clinic visit and recheck  Return to Office (Request) (Complete)  Return to Office (Request) (Discontinue)  Return to Office (Request) (Complete).     Orders   Charges (Evaluation and Management):  [id number] office outpatient visit 15 minutes (Charge) (Order): Quantity: 1, Scalp laceration  Dizziness  Embolic cerebral infarction  HTN (hypertension).

## 2022-02-15 NOTE — RESULTS
Anticoagulation Therapy Entered On:  10/16/2019 4:48 PM CDT    Performed On:  10/16/2019 4:44 PM CDT by Karena Freeman RN               Warfarin Management   Week 1 Sunday Dose :   2.5    Week 1 Monday Dose :   2.5    Week 1 Tuesday Dose :   2.5    Week 1 Wednesday Dose :   2.5    Week 1 Thursday Dose :   2.5    Week 1 Friday Dose :   2.5    Week 1 Saturday Dose :   2.5    Week 1 Dose Total :   17.5    Planned Duration :   Indefinite   Indication :   Stroke   Warfarin Management Comments :   [facility] Office  [address], [address]  [phone]  Capillary Specimen     International Normalization Ratio :   1.8    INR Range :   2.0 - 3.0   INR Therapeutic Range :   No   Warfarin Abnormal Findings :   Advised MD visit for CHF. Daughter is calling the cardiologist, May go to ER for CHF symptoms. Declined visit here now.   Karena Freeman RN - 10/16/2019 4:44 PM CDT   Warfarin Management and Results Grid   Signs of Thrombolic :   No   Signs of Warfarin Intolerance :   No   Karena Freeman RN - 10/16/2019 4:44 PM CDT   Anticoagulation Recheck :   2 weeks   Warfarin Physician :   Jimi MG, Berhane CRISTOBAL   Warfarin Special Instructions :   INR = 1.8 per POC Patient is to stay on 2.5mg daily and recheck INR in 2 weeks per DG. Patient and daughter advised in office.    Karena Freeman RN - 10/16/2019 4:44 PM CDT

## 2022-02-15 NOTE — PROGRESS NOTES
Patient:   PUMA BAEZ            MRN: [id number]            FIN: [id number]               Age:   81 years     Sex:  Male     :  1935   Associated Diagnoses:   Pre-operative examination; CHF (Congestive Heart Failure); Coronary Artery Disease; Mitral valve regurgitation   Author:   Berhane Krause PA-C      Preoperative Information   Procedure/ Case: mitral valve CLIP   he has severe mitral valve insufficiency with known coronary artery disease.  He will be meeting with the cardiology team tomorrow to discuss options.  He has not tolerated blood thinners in the past      Chief Complaint   2017 8:58 AM CDT    Pt here for Preop Px for Heart surgery at [facility].  Pt states Procedure Date and Doctor are unknown at this time.  Pt states he has appt with Cardiology at [facility] tomorrow.      Review of Systems   Constitutional:  Fatigue.    Ear/Nose/Mouth/Throat:  Negative.    Respiratory:  Negative.    Cardiovascular:  Negative.    Gastrointestinal:  Negative.    Genitourinary:  Negative.       Health Status   Allergies:    Allergic Reactions (Selected)  Severity Not Documented  Narcotics (Severe constipation)  Vicodin (Urinary retention)   Medications:  (Selected)   Prescriptions  Prescribed  aspirin 81 mg oral tablet: See Instructions, Instructions: 1 tab(s) po every third day, # 100 tab(s), 0 Refill(s), Type: Maintenance, OTC (Rx)  Documented Medications  Documented  Cozaar 100 mg oral tablet: 1 tab(s) ( 100 mg ), po, daily, 0 Refill(s)  Fish Oil: ( 1,000 mg ), po, cap(s), 0 Refill(s), Type: Maintenance  Metoprolol Succinate  mg oral tablet, extended release: 1 tab(s) ( 100 mg ), PO, Daily, 0 Refill(s)  Saw Palmetto oral capsule: 0 Refill(s), Type: Maintenance  amlodipine 10 mg oral tablet: 1 tab(s), PO, Daily, # 30 tab(s), 0 Refill(s)  atorvastatin 40 mg oral tablet: 1 tab(s) ( 40 mg ), PO, Daily, # 30 tab(s), 0 Refill(s), Type: Maintenance  furosemide 20 mg oral tablet: 1 tab(s) ( 20  "mg ), po, bid, 0 Refill(s)  Suspended  potassium chloride: ( 10 mEq ), daily, 0 Refill(s), Type: Maintenance   Problem list:    All Problems  Latent tuberculosis / SNOMED CT 57014572 / Confirmed  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 45055242 / Confirmed  HTN (Hypertension) / ICD-9-.9 / Confirmed  MI (Myocardial Infarction) / ICD-9-.90 / Confirmed  Coronary Artery Disease / ICD-9-.00 / Confirmed  CHF (Congestive Heart Failure) / ICD-9-.0 / Confirmed  Barretts Esophagus / ICD-9-.85 / Confirmed  Inactive: CABG / ICD-9-CM V45.81  Resolved: Herpes Zoster / ICD-9-.9      Histories   Past Medical History:    Active  CHF (Congestive Heart Failure) (428.0): Onset in the month of 2008 at 73 years  MI (Myocardial Infarction) (410.90): Onset in the month of 2007 at 72 years  Barretts Esophagus (530.85): Onset in the month of 2003 at 67 years  Traumatic blindness of left eye (91016504): Onset in  at 64 years.  Comments:  2010 CDT 2:58 PM CDT - Olivia Coates CMA  \"ruptured blood vessel\"  HTN (Hypertension) (401.9): Onset in  at 53 years.  Latent tuberculosis (07027610)  Coronary Artery Disease (414.00)  Resolved  Herpes Zoster (053.9): Onset on 2009 at 73 years.  Resolved.   Family History:    Cancer  Brother ()  Comments:  2010 3:29 PM - Olivia Coates CMA  Lung  Brother  Comments:  2010 3:29 PM - JaxonOlivia jeronimo CMA  Oral  Brother ()  Comments:  2010 3:29 PM - JaxonOlivia jeronimo CMA  colon  Brother ()  Leukemia  Sister (Adrianna, )  Lymphoma  Sister  Rheumatoid arthritis  Brother  TB - Tuberculosis  Father ()  Mother ()  HTN - Hypertension  Brother ()  Sister (Adrianna, )  Sister  Brother ()     Procedure history:    Extracapsular cataract extraction and insertion of intraocular lens (816425682) on 2017 at 81 Years.  Comments:  2017 12:37 PM - Dalila Rondon.  Coronary " angiogram (98556233) on 3/6/2017 at 81 Years.  Repair of recurrent ventral hernia w/mesh (390871157) on 8/21/2012 at 76 Years.  Repair of epigastric hernia (207626415) on 3/15/2012 at 76 Years.  right shoulder rotator cuff repair in the month of 8/2010 at 74 Years.  Comments:  8/18/2010 3:06 PM - RENARD GORMAN  Colonoscopy (206198410) in the month of 3/2009 at 73 Years.  Comments:  8/18/2010 3:02 PM - RENARD GORMAN  Normal  CABG x 2 - Coronary artery bypass grafts x 2 ([id number]) in the month of 11/2007 at 72 Years.  laser for photocoagulation in the month of 3/2007 at 71 Years.  EGD in the month of 1/2003 at 67 Years.  Flexible sigmoidoscopy ([id number]) on 10/12/2000 at 65 Years.  Flexible sigmoidoscopy (08324860) on 10/19/1993 at 58 Years.  right shoulder decompression in 1989 at 54 Years.  Wrist Fracture in 1987 at 52 Years.  Comments:  8/18/2010 3:07 PM - RENARD GORMAN  Compression fracutre of foot in 1978 at 43 Years.  L ankle compression in 1975 at 40 Years.  Bilateral vasectomy (048062682) in 1975 at 40 Years.  Rotator cuff repair (444067971) in 1964 at 29 Years.  Comments:  2/24/2012 10:25 AM - Berhane Krause PA-C.  left shoulder  Hernia repair ([id number]).  Comments:  8/18/2010 3:05 PM - RENARD GORMAN  Bilateral, Laparoscopy  vitreoretinal surgery.   Social History:        Alcohol Assessment: Current            Current                     Comments:                      08/18/2010 - Renard Coates CMA                     rare      Tobacco Assessment: Denies Tobacco Use      Substance Abuse Assessment: Denies Substance Abuse      Home and Environment Assessment            Marital status: .       Has  no history of anemia.  Has  no history of DVT or pulmonary embolism.  Has a  personal history of bleeding problems.   Has no personal history of anesthesia reactions.  Has no  personal history of sleep apnea  Patient  does not have active tuberculosis.    S/he has taken aspirin or aspirin-containing  products in the last week.     S/he  has not taken Plavix (Clopidogrel) in the last 2 weeks.    S/he  has not taken warfarin in the past week.    S/he has not been on corticosteroids for more than 2 weeks recently.   S/he is DNR before, during or after surgery.          Chest pain / SOB walking up 2 flights of steps? No  Pain in neck or jaw? No  CAD MI?  YES  Afib? No  Heart Failure? YES  Asthma  or Bronchitis? No  Diabetes? No       Insulin/Orals?   Seizure Disorder? No  CKD? No  Thyroid Disease? No  Liver Disease? No  CVA? No      Physical Examination   Vital Signs   8/28/2017 8:58 AM CDT Temperature Tympanic 97.8 DegF  LOW    Peripheral Pulse Rate 72 bpm    Pulse Site Radial artery    Respiratory Rate 20 br/min    Systolic Blood Pressure 178 mmHg  HI    Diastolic Blood Pressure 76 mmHg    Mean Arterial Pressure 110 mmHg    BP Site Right arm    BP Systolic Repeat 168 mmHg    BP Diastolic Repeat 84 mmHg    BP Site Repeat Right arm    Oxygen Saturation 98 %      Measurements from flowsheet : Measurements   8/28/2017 8:58 AM CDT Height Measured - Standard 68 in    Weight Measured - Standard 190 lb    BSA 2.03 m2    Body Mass Index 28.89 kg/m2      General:  No acute distress.    Eye:  Pupils are equal, round and reactive to light, Extraocular movements are intact.    HENT:  Tympanic membranes are clear, No pharyngeal erythema, No sinus tenderness.    Neck:  Supple, Non-tender, No lymphadenopathy.    Respiratory:  Lungs are clear to auscultation.    Cardiovascular:  Normal rate, Regular rhythm, No edema, grade 4/6 holosystolic murmur.    Gastrointestinal:  Soft, Non-tender, Non-distended, Normal bowel sounds, No organomegaly.    Musculoskeletal:  Normal range of motion.    Psychiatric:  Appropriate mood & affect.       Review / Management   Results review:  labs from 8/22 were normal.    ECG interpretation:  EKG from 6/29/2017 shows normal sinus rhythm with left axis deviation.       Impression and Plan   Diagnosis      Pre-operative examination (GGZ09-RF Z01.818).     CHF (Congestive Heart Failure) (IHO97-NE I50.9).     Coronary Artery Disease (ZHH35-RW I25.10).     Mitral valve regurgitation (UTK86-VD I34.0).     Pre-operative examination (WAY72-BQ Z01.818).     Condition:  Stable, will defer to cardiology as to surgical clearance.    Orders     Orders   Charges (Evaluation and Management):  75197 office outpatient visit 25 minutes (Charge) (Order): Quantity: 1, Pre-operative examination  Mitral valve regurgitation  Coronary Artery Disease  CHF (Congestive Heart Failure).

## 2022-02-15 NOTE — PROGRESS NOTES
Patient:   PUMA BAEZ            MRN: [id number]            FIN: [id number]               Age:   82 years     Sex:  Male     :  1935   Associated Diagnoses:   None   Author:   Jerry Loza MD      Procedure   EKG procedure   Indication: chest pain.     Position: supine.     EKG findings   Interpretation: by primary care provider.     No change from prior EKG: since  2018.     Rhythm: sinus occas pvc.     P waves: normal.     Interpretation: ST-T wave abnormalities No acute ST changes, LAFB.     Discussed: with patient.     Not normal EKG.        Impression and Plan   Orders

## 2022-02-15 NOTE — NURSING NOTE
Anticoagulation Therapy Management Entered On:  7/2/2020 3:47 PM CDT    Performed On:  7/2/2020 3:45 PM CDT by Darlene Thomas RN               Anticoagulation Visit Assessment   Type of Visit - Anticoagulation :   Telephone   Anticoagulation Indication :   Atrial fibrillation, DVT prophylaxis   Anticoagulant Duration :   Undetermined   Anticoagulation Medication Verified :   Yes   Patient on Warfarin :   Yes   Darlene Thomas RN - 7/2/2020 3:45 PM CDT   Warfarin   International Normalization Ratio TR :   2.2    Anticoagulant INR Goal Lower :   2    Anticoagulant INR Goal Upper :   3    Sunday :   2.5 mg   Monday :   2.5 mg   Tuesday :   1.25 mg   Wednesday :   2.5 mg   Thursday :   2.5 mg   Friday :   1.25 mg   Saturday :   2.5 mg   Total Dose :   15 mg   Warfarin Pt Reported Previous Week Dose :    Sun Mon Tues Wed Thurs Fri Sat Weekly Total Dose   Week 1                   Week 2                   Week 3                   Week 4                         Patient is taking single or multiple strength tablet(s) :   Single strength tab(s)   One Tab Strength :   2.5 mg tab   Sunday :   0 mg   Monday :   0 mg   Tuesday :   0 mg   Wednesday :   0 mg   Thursday :   2.5 mg   Friday :   0 mg   Saturday :   0 mg   Week 1 Total Dose :   2.5 mg   Sunday :   0 tab(s)   Monday :   0 tab(s)   Tuesday :   0 tab(s)   Wednesday :   0 tab(s)   Thursday :   1 tab(s)   Friday :   0 tab(s)   Saturday :   0 tab(s)   Patient Instructions :   INR = 2.2 per [facility]. Per [facility] nurse - [facility] Director OK'd pt to d/c Warfarin therapy and begin Aspirin 325mg daily. [facility] needing one day dosing for Warfarin. Per protocol, continue Warfarin 2.5mg today. Advised by phone.      Darlene Thomas RN - 7/2/2020 3:45 PM CDT

## 2022-02-15 NOTE — NURSING NOTE
Anticoagulation Therapy Entered On:  4/1/2020 12:10 PM CDT    Performed On:  4/1/2020 12:08 PM CDT by Yamila Bedolla RN               Warfarin Management   Week 1 Sunday Dose :   2.5    Week 1 Monday Dose :   2.5    Week 1 Tuesday Dose :   2.5    Week 1 Wednesday Dose :   5    Week 1 Thursday Dose :   2.5    Week 1 Friday Dose :   2.5    Week 1 Saturday Dose :   2.5    Week 1 Dose Total :   20    Planned Duration :   Indefinite   Indication :   Atrial fibrillation, DVT   Warfarin Management Comments :   Call received from [facility]   International Normalization Ratio :   1.7    INR Range :   2.0 - 3.0   INR Therapeutic Range :   No   Warfarin Abnormal Findings :   Patient taking Lorazepam PRN; admitted to Hospice   Yamila Bedolla RN - 4/1/2020 12:08 PM CDT   Warfarin Management and Results Grid   Signs of Thrombolic :   No   Signs of Warfarin Intolerance :   No   Changes in Diet or Alcohol Intake :   No   Changes in Medication or Antibiotics :   Yes   Unusual Bleeding or Bruising :   No   Rectal Bleeding or Black Stools :   No   Vitamin K Food Handout :   No   Heart Valve Replacement :   No   Yamila Bedolla RN - 4/1/2020 12:08 PM CDT   Anticoagulation Recheck :   1 week   Warfarin Special Instructions :   Per protocol take warfarin booster dose of 5 mg today then take 2.5 mg daily; recheck INR in 1 week; [facility] notified by phone at [phone]   Yamila Bedolla RN - 4/1/2020 12:08 PM CDT

## 2022-02-15 NOTE — TELEPHONE ENCOUNTER
---------------------  From: Yamila Bedolla RN   Sent: 1/31/2020 8:31:07 AM CST  Subject: INR past due     Patient is 15 days past due for INR.  First letter has been printed from [employer] and sent to [facility] for scanning and mailing.

## 2022-02-15 NOTE — PROGRESS NOTES
"   Patient:   PUMA BAEZ            MRN: [id number]            FIN: [id number]               Age:   82 years     Sex:  Male     :  1935   Associated Diagnoses:   Pre-operative examination; Anemia, blood loss; CHF (congestive heart failure); History of MI (myocardial infarction); Presence of IVC filter   Author:   Jimi MG, Berhane CRISTOBAL      Preoperative Information   Procedure/ Case: removal of IVC filter   Location scheduled: [facility]   Date/ Time scheduled: 5/15/2018 8:00:00 AM      Chief Complaint   2018 10:08 AM CDT   Pt here for a Preop Px \"to get my filter taken out\"  at [facility] 5-15-18.  Pt is unsure of Surgeon.  Pt states he had an episode of chest pain last Friday. Pt states he was on \"my way to the ER but then it went away and I didnt go\"        Review of Systems   Constitutional:  Weakness, Fatigue.    Respiratory:  No shortness of breath, No cough.    Cardiovascular:  No chest pain.    Gastrointestinal:  Negative.       Health Status   Allergies:    Allergic Reactions (Selected)  Severity Not Documented  Narcotics (Severe constipation)  Vicodin (Urinary retention)   Medications:  (Selected)   Prescriptions  Prescribed  FLUoxetine 20 mg oral capsule: 1 cap(s) ( 20 mg ), po, daily, # 90 cap(s), 3 Refill(s), Type: Maintenance  aspirin 81 mg oral tablet: 2 tab(s) ( 162 mg ), po, daily, # 100 tab(s), 0 Refill(s), Type: Maintenance, OTC (Rx)  ferrous gluconate 324 mg (37.5 mg elemental iron) oral tablet: 1 tab(s) ( 324 mg ), po, daily, # 90 tab(s), 3 Refill(s), Type: Maintenance  finasteride 5 mg oral tablet: 1 tab(s) ( 5 mg ), PO, Daily, # 90 tab(s), 3 Refill(s), Type: Maintenance  furosemide 40 mg oral tablet: 1 tab(s) ( 40 mg ), PO, bid, # 60 tab(s), 1 Refill(s), Type: Maintenance  losartan 25 mg oral tablet: 1 tab(s) ( 25 mg ), PO, Daily, # 90 tab(s), 3 Refill(s), Type: Maintenance  metoprolol succinate 50 mg oral tablet, extended release: 1 tab(s) ( 50 mg ), PO, Daily, # 90 " tab(s), 3 Refill(s), Type: Maintenance  Documented Medications  Documented  acetaminophen 325 mg oral capsule: 1 cap(s) ( 325 mg ), po, tid, 0 Refill(s), Type: Maintenance  clotrimazole 1% topical cream: 1 annmarie, top, bid, 0 Refill(s), Type: Maintenance  Suspended  potassium chloride: ( 10 mEq ), daily, 0 Refill(s), Type: Maintenance   Problem list:    All Problems  Embolic cerebral infarction / SNOMED CT 8651495091 / Confirmed  HTN (hypertension) / SNOMED CT 7201220202 / Confirmed  Bilateral inguinal hernia / SNOMED CT 852566906 / Confirmed  Hematoma / SNOMED CT 1912907894 / Confirmed  Abnormal glucose / SNOMED CT 121088314 / Confirmed  Latent tuberculosis / SNOMED CT 31034137 / Confirmed  Anemia, blood loss / SNOMED CT 6059092493 / Confirmed  Hernia, ventral / SNOMED CT 9966538355 / Confirmed  Obesity / SNOMED CT 5004527411 / Probable  History of deep venous thrombosis / SNOMED CT 0292122333 / Confirmed  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 / SNOMED CT 9751101957 / Confirmed  S/P mitral valve clip implantation / SNOMED CT 5316399932 / Confirmed  S/P mitral valve clip implantation / SNOMED CT 3743809218 / Confirmed  Insomnia / SNOMED CT 117150180 / Confirmed  History of MI (myocardial infarction) / SNOMED CT 0842097611 / Confirmed  Nonrheumatic mitral valve regurgitation / SNOMED CT 376105153 / Confirmed  Esophageal reflux / SNOMED CT 433270914 / Confirmed  Lumbar spondylosis / SNOMED CT 132023225 / Confirmed  Epistaxis / SNOMED CT 442854064 / Confirmed  Hyperplasia of prostate / SNOMED CT 712955836 / Confirmed  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 75851331 / Confirmed  Urinary retention / SNOMED CT 617345173 / Confirmed  Acute posthemorrhagic anemia / SNOMED CT 560706379 / Confirmed  Left hemiparesis / SNOMED CT 060999056 / Confirmed  Pain of right thumb / SNOMED CT 235783866 / Confirmed  Barretts esophagus / SNOMED CT 174670941 / Confirmed  Chronic alcohol use / SNOMED CT 801474050 /  "Confirmed  Hematuria / SNOMED CT 429258243 / Confirmed  Foot pain / SNOMED CT 158748167 / Confirmed  CHF (congestive heart failure) / SNOMED CT 46798797 / Confirmed  CAD (coronary artery disease) / SNOMED CT 70610953 / Confirmed  Hyperlipidemia / SNOMED CT 49396047 / Confirmed  Tuberculosis / SNOMED CT 66891610 / Confirmed  Inactive: CABG x 2 - Coronary artery bypass grafts x 2 / SNOMED CT 090060044  Resolved: History of fracture of right ankle / SNOMED CT 4925836772  Resolved: H/O fracture of wrist / SNOMED CT 0053786110  Resolved: Burst blood vessel in eye / SNOMED CT 800478051  Resolved: H/O herpes zoster / SNOMED CT 7870859917  Resolved: Inpatient stay / SNOMED CT 585421387  Resolved: Age-related cataract of both eyes / SNOMED CT 889054509  Resolved: Acute kidney injury / SNOMED CT 51088708      Histories   Past Medical History:    Active  Hematoma (3502262211): Onset on 12/15/2017 at 82 years.  Embolic cerebral infarction (3775014714): Onset on 12/10/2017 at 82 years.  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  Comments:  8/18/2010 CDT 2:58 PM CDT - Olivia Coates CMA  \"ruptured blood vessel\"  HTN (hypertension) (4834573407): Onset in 1989 at 54 years.  Latent tuberculosis (37556403)  CAD (coronary artery disease) (89201527)  Acute posthemorrhagic anemia (707620641)  Hyperlipidemia (89484801)  Epistaxis (351030735)  Comments:  1/18/2018 CST 10:33 AM CST - Thuy Hopkins  Chronic  Abnormal glucose (104982793)  Foot pain (905945908)  Nonrheumatic mitral valve regurgitation (047429127)  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 (2041661134)  Hematuria (667315469)  History of deep venous thrombosis " (6285048395)  Comments:  2018 CST 9:57 AM Thuy Topete  Right lower extremity.  Iliac vein.  Anemia, blood loss (6155127789)  Tuberculosis (68870302)  Comments:  2018 CST 10:02 AM Thuy Topete  Patient tests positive.  See family history.  Bilateral inguinal hernia (156374345)  Hernia, ventral (4051468449)  Hyperplasia of prostate (471987120)  Comments:  2018 CST 10:24 AM Thuy Topete  Benign  Insomnia (108269360)  Pain of right thumb (010901661)  Comments:  2018 CST 10:36 AM Thuy Topete  Chronic  Chronic alcohol use (707264579)  Esophageal reflux (305426022)  Lumbar spondylosis (581739375)  Resolved  Inpatient stay (508809761): Onset on 2017 at 82 years.  Resolved on 2018 at 82 years.  Comments:  2018 CST 1:09 PM Blossom Art  @[facility], [place], [place] - Embolic cerebral infarction    2018 CST 1:14 PM Blossom Art  [facility] not W  H/O herpes zoster (6432612684): Onset on 2009 at 73 years.  Resolved.  Age-related cataract of both eyes (652147308):  Resolved.  Acute kidney injury (29763542):  Resolved.  History of fracture of right ankle (4841624964):  Resolved.  Comments:  2018 CST 10:06 AM Thuy Topete  Right  Burst blood vessel in eye (868258748):  Resolved.  Comments:  2018 CST 10:05 AM Thuy Toptee  Left  H/O fracture of wrist (2656677912):  Resolved.  Comments:  2018 CST 10:08 AM Thuy Topete  Right   Family History:    Cancer  Brother ()  Comments:  2010 3:29 PM - Jaxon CMA, Olivia  Lung  Brother  Comments:  2010 3:29 PM - Jaxon CMA, Olivia  Oral  Brother ()  Comments:  2010 3:29 PM - Jaxon CMA, Olivia  colon  Brother ()  Leukemia  Sister (Adrianna, )  Lymphoma  Sister  Rheumatoid arthritis  Brother  TB - Tuberculosis  Father ()  Mother ()  HTN - Hypertension  Brother ()  Sister (Adrianna,  )  Sister  Brother ()     Procedure history:    History of inferior vena cava filter placement (4351879553) on 12/10/2017 at 82 Years.  Mitral valve clip (6985965461) on 2017 at 82 Years.  Comments:  2018 1:16 PM - Blossom Varela  Complicated by air embolization and intermedius vein graft  PCI - Percutaneous coronary intervention (8674609232) on 2017 at 82 Years.  Comments:  2018 1:18 PM - Blossom Varela  via left femoral artery with coronary angiogram.  Intraaortic balloon pump placed and later removed.  Coronary angiogram (48834104) on 2017 at 81 Years.  Extracapsular cataract extraction and insertion of intraocular lens (263939833) on 2017 at 81 Years.  Comments:  2017 12:37 PM - Dalila Rondon  Right.  Extracapsular cataract extraction and insertion of intraocular lens (882688165) on 2017 at 81 Years.  Comments:  2017 1:44 PM - Dalila Rondon  Left.  Coronary angiogram (94537851) on 3/6/2017 at 81 Years.  Repair of recurrent ventral hernia w/mesh (384957165) on 2012 at 76 Years.  Repair of epigastric hernia (907235088) on 3/15/2012 at 76 Years.  right shoulder rotator cuff repair in the month of 2010 at 74 Years.  Comments:  2010 3:06 PM - RENARD GORMAN  Colonoscopy (155654116) in the month of 3/2009 at 73 Years.  Comments:  2010 3:02 PM - RENARD GORMAN  Normal  CABG x 2 - Coronary artery bypass grafts x 2 (914406105) in the month of 2007 at 72 Years.  laser for photocoagulation in the month of 3/2007 at 71 Years.  EGD in the month of 2003 at 67 Years.  Flexible sigmoidoscopy (89061948) on 10/12/2000 at 65 Years.  Flexible sigmoidoscopy (81104520) on 10/19/1993 at 58 Years.  right shoulder decompression in  at 54 Years.  Wrist Fracture in  at 52 Years.  Comments:  2010 3:07 PM - RENARD GORMAN  Compression fracutre of foot in  at 43 Years.  L ankle compression in  at 40 Years.  Bilateral vasectomy  (708520615) in 1975 at 40 Years.  Back (624820983) in 1975 at 40 Years.  Comments:  1/18/2018 10:26 AM - Thuy Hopkins  Lumbar and thoracic.    1/18/2018 10:23 AM - Thuy Hopkins  History of spine surgery.  Rotator cuff repair (991896249) in 1964 at 29 Years.  Comments:  2/24/2012 10:25 AM - Berhane Krause PA-C.  left shoulder  Hernia repair (75522740).  Comments:  8/18/2010 3:05 PM - RENARD GORMAN  Bilateral, Laparoscopy  vitreoretinal surgery.  ORIF - Open reduction of fracture of ankle with internal fixation (587279320168362).  Comments:  1/18/2018 10:11 AM - Thuy Hopkins  Right   Social History:        Alcohol Assessment: Current            Current                     Comments:                      08/18/2010 - Renard Coates CMA                     rare      Tobacco Assessment: Denies Tobacco Use      Substance Abuse Assessment: Denies Substance Abuse      Home and Environment Assessment            Marital status: .       Has a history of anemia.  Has a history of DVT or pulmonary embolism.  Has a personal history of bleeding problems.   Has  no personal history of anesthesia reactions.  Has  no  personal history of sleep apnea  Patient does / does not have active tuberculosis.    S/he has taken aspirin or aspirin-containing products in the last week.     S/he  has not taken Plavix (Clopidogrel) in the last 2 weeks.    S/he has not taken warfarin in the past week.    S/he  has not been on corticosteroids for more than 2 weeks recently.   S/he is not DNR before, during or after surgery.          Chest pain / SOB walking up 2 flights of steps? No  Pain in neck or jaw? No  CAD MI?  Yes  Afib? No  Heart Failure? Yes  Asthma  or Bronchitis? No  Diabetes? No       Insulin/Orals?   Seizure Disorder? No  CKD? No  Thyroid Disease? No  Liver Disease? No  CVA? Yes      Physical Examination   Vital Signs   5/11/2018 10:08 AM CDT Temperature Tympanic 96.7 DegF  LOW    Peripheral Pulse Rate 64 bpm    Pulse Site Radial  artery    Respiratory Rate 16 br/min    Systolic Blood Pressure 188 mmHg  HI    Diastolic Blood Pressure 112 mmHg  HI    Mean Arterial Pressure 137 mmHg    BP Site Right arm    BP Systolic Repeat 168 mmHg    BP Diastolic Repeat 90 mmHg    BP Site Repeat Right arm    Oxygen Saturation 99 %    Additional Vitals Info 168      Measurements from flowsheet : Measurements   5/11/2018 10:08 AM CDT Height Measured - Standard 68 in    Weight Measured - Standard 188 lb    BSA 2.02 m2    Body Mass Index 28.58 kg/m2  HI      General:  No acute distress.    Eye:  Pupils are equal, round and reactive to light, Extraocular movements are intact.    HENT:  Tympanic membranes are clear, No pharyngeal erythema, No sinus tenderness.    Neck:  Supple, Non-tender, No lymphadenopathy.    Respiratory:  Lungs are clear to auscultation.    Cardiovascular:  Normal rate, Regular rhythm, grade 3/6 holosystolic murmur, +1 ankle edema.    Gastrointestinal:  Soft, Non-tender.    Musculoskeletal:  Normal range of motion.    Neurologic:  Cranial Nerves II-XII are grossly intact, Normal deep tendon reflexes.    Psychiatric:  Appropriate mood & affect.       Review / Management   Results review:  Lab results   5/11/2018 11:25 AM CDT Sodium Level 140 mmol/L    Potassium Level 4.1 mmol/L    Chloride Level 105 mmol/L    CO2 Level 26 mmol/L    AGAP 9    Glucose Level 116 mg/dL    BUN 19 mg/dL    Creatinine Level 1.28 mg/dL    BUN/Creat Ratio 15    eGFR  >60    eGFR Non-African American 54    Calcium Level 9.5 mg/dL    Troponin I 0.028 ng/mL    B-Natriuretic Peptide (BNP) 1205 pg/mL    WBC 5.6    RBC 4.24    Hgb 13.3 g/dL    Hct 38.7 %    MCV 91 fL    MCH 31.4 pg    MCHC 34.4 g/dL    RDW 15.0 %    Platelet 133    MPV 9.8 fL    Lymphocytes 17.2 %    Abs Lymphocytes 1.0    Neutrophils 66.4 %    Abs Neutrophils 3.7    Monocytes 11.7 %    Abs Monocytes 0.7    Eosinophils 4.3 %    Abs Eosinophils 0.2    Basophils 0.4 %    Abs Basophils 0.0   .     ECG interpretation:  read by Dr Loza, no change from March 2018.       Impression and Plan   Diagnosis     Pre-operative examination (SBB54-AN Z01.818).     Anemia, blood loss (XVC71-KU D50.0).     CHF (congestive heart failure) (DJG43-TH I50.9).     History of MI (myocardial infarction) (QRD00-OD I25.2).     Presence of IVC filter (BMF18-NG Z95.828).     Orders     Orders   Requests (Lab):  Troponin-I (Request) (Order): Priority: Urgent, Pre-operative examination  Presence of IVC filter  Anemia, blood loss  History of MI (myocardial infarction)  CBC w/ Diff/Plt (Request) (Order): Priority: Urgent, Pre-operative examination  Presence of IVC filter  Anemia, blood loss  History of MI (myocardial infarction)  Basic Metabolic Panel (Request) (Order): Priority: Urgent, Pre-operative examination  Presence of IVC filter  Anemia, blood loss  History of MI (myocardial infarction).     Orders   Requests (Lab):  B-type Natriuretic Peptide (Request) (Order): Priority: Urgent, CHF (congestive heart failure).     Orders   Requests (Consults / Referrals):  Referral (Request) (Order): 5/11/2018 12:22 PM CDT, Referred to: Cardiology, Referred to: Dr Deyvi Collins, [facility], CHF (congestive heart failure)  Charges (Evaluation and Management):  [address] office outpatient visit 25 minutes (Charge) (Order): Quantity: 1, Presence of IVC filter  Pre-operative examination  Anemia, blood loss  History of MI (myocardial infarction)  CHF (congestive heart failure).     Approved for surgery without restrictions.     BNP is elevated, will refer to cardiology.

## 2022-02-15 NOTE — NURSING NOTE
Anticoagulation Therapy Entered On:  9/20/2019 4:44 PM CDT    Performed On:  9/20/2019 4:38 PM CDT by Karena Freeman RN               Warfarin Management   Planned Duration :   Indefinite   Indication :   Atrial fibrillation, DVT   International Normalization Ratio :   3.7    INR Range :   2.0 - 3.0   INR Therapeutic Range :   No   Warfarin Abnormal Findings :   Alcohol daily but states has cut back to 1 shot per day.  2 teeth to be extracted on 9/23/19.   Karena Freeman RN - 9/20/2019 4:38 PM CDT   Warfarin Management and Results Grid   Changes in Diet or Alcohol Intake :   Yes   Unusual Bleeding or Bruising :   No   Rectal Bleeding or Black Stools :   No   Karena Freeman RN - 9/20/2019 4:38 PM CDT   Anticoagulation Recheck :   9/23/19   Warfarin Physician :   Berhane Krause PA-C   Warfarin Special Instructions :   INR = 3.7 per lab on 9/20/19 Patient is to  hold warfarin until after dental work on 9/23/19 Patient is to then take 2.5mg warfarin QOD alternating with 1.25mg QOD. Will recheck INR on 9/23/19 prior to dental work. Daughter called with directions   Karena Freeman RN - 9/20/2019 4:38 PM CDT

## 2022-02-15 NOTE — NURSING NOTE
Anticoagulation Therapy Entered On:  9/9/2019 5:16 PM CDT    Performed On:  9/9/2019 5:12 PM CDT by Karena Freeman RN               Warfarin Management   Week 1 Tuesday Dose :   1.5    Week 1 Wednesday Dose :   3    Week 1 Thursday Dose :   1.5    Week 1 Friday Dose :   3    Week 1 Saturday Dose :   1.5    Week 2 Sunday Dose :   3    Week 2 Monday Dose :   1.5    Week 2 Tuesday Dose :   3    Week 2 Wednesday Dose :   1.5    Week 2 Thursday Dose :   3    Week 2 Friday Dose :   1.5    Week 2 Saturday Dose :   3    Week 2 Dose Total :   16.5    Planned Duration :   Indefinite   Indication :   DVT, Stroke   Warfarin Management Comments :   [facility] Office  [address], [address]  [phone]  Capillary Specimen     International Normalization Ratio :   2.3    INR Range :   2.0 - 3.0   INR Therapeutic Range :   Yes   Warfarin Abnormal Findings :   fall from week end large bruise on for head . Possibly from several drinks he had at that time. Will see provider today.   Karena Freeman RN - 9/9/2019 5:12 PM CDT   Warfarin Management and Results Grid   Changes in Diet or Alcohol Intake :   Yes   Karena Freeman RN - 9/9/2019 5:12 PM CDT   Anticoagulation Recheck :   9/11/19   Warfarin Physician :   Berhane Krause PA-C   Warfarin Special Instructions :   INR = 2.3 per POC Patient is to take 5mg warfarin daily and continue lovenox x 2 more days. Recheck INR on 9/11/19 per BAM. Patient advised in visit by provider and daughter is called with results and directions per patient request.   Karena Freeman RN - 9/9/2019 5:12 PM CDT

## 2022-02-15 NOTE — TELEPHONE ENCOUNTER
---------------------  From: Martha ORTEGA, Darlene (INR Pool ( [id number]_[facility]))   To: Jimi MG, Berhane CRISTOBAL;     Sent: 7/2/2020 3:50:29 PM CDT  Subject: LELAND     Spoke with Jocelyne Alejo RN for [facility]. [facility] Medical Director gave OK for patient to discontinue Warfarin Therapy and start Aspirin 325mg daily. Today was patient's last INR at 2.2. Advised he take 2.5mg today per previous dosing.  Jocelyne will send copy of report for review.

## 2022-02-15 NOTE — RESULTS
Anticoagulation Therapy Entered On:  9/30/2019 4:56 PM CDT    Performed On:  9/30/2019 4:45 PM CDT by Karena Freeman RN               Warfarin Management   Week 1 Sunday Dose :   2.5    Week 1 Monday Dose :   2.5    Week 1 Tuesday Dose :   2.5    Week 1 Wednesday Dose :   2.5    Week 1 Thursday Dose :   2.5    Week 1 Friday Dose :   2.5    Week 1 Saturday Dose :   2.5    Week 1 Dose Total :   17.5    Planned Duration :   Indefinite   Indication :   Stroke   Warfarin Management Comments :   [facility] Office  [address], [address]  [phone]  Capillary Specimen     International Normalization Ratio :   1.4    INR Range :   2.0 - 3.0   INR Therapeutic Range :   No   Warfarin Abnormal Findings :   1 single shot coctail per day per patient   Karena Freeman RN - 9/30/2019 4:45 PM CDT   Warfarin Management and Results Grid   Signs of Thrombolic :   No   Signs of Warfarin Intolerance :   No   Karena Freeman RN - 9/30/2019 4:45 PM CDT   Anticoagulation Recheck :   10 days   Warfarin Physician :   Berhane Krause PA-C   Warfarin Special Instructions :   INR = 1.4 per POC Patient is to take 2.5mg warfarin daily and recheck INR in 10 days per GTG Patients daughter Tammy advised via call per patient request.   Karena Freeman RN - 9/30/2019 4:45 PM CDT

## 2022-02-15 NOTE — PROGRESS NOTES
Patient:   PUMA BAEZ            MRN: [id number]            FIN: [id number]               Age:   81 years     Sex:  Male     :  1935   Associated Diagnoses:   Epistaxis   Author:   Berhane Krause PA-C      Chief Complaint   3/31/2017 8:59 AM CDT    Pt here for ER FU at [facility] for epistaxis. Pt states he did accidently pull out the nasal tampon form left nostril last night.        History of Present Illness   Chief complaint and symptoms noted above and confirmed with patient   he was seen in the ER on 3/28 for epistaxis, a rhino rocket was placed, he accidentally removed the rhino rocket last   night while eating dinner when his fork caught the string.  No bleeding after removal.    He had been scheduled for transcatheter mitral tracy ve repair due to severe mitral regurgitation and was placed on Plavix  before this procedure which was scheduled for 3/27 but he developed a nose bleed.  The bleeding was intermittent  but then it became more pronounced on 3/28 and he went to the ER.  The mitral valve repair has been placed on hold for now.    He is off his Plavix and his aspirin.  He will be following up with his cardiac surgeon on       Review of Systems   Constitutional:  Negative.    Ear/Nose/Mouth/Throat:  Negative.    Respiratory:  Negative.       Health Status   Allergies:    Allergic Reactions (Selected)  Severity Not Documented  Narcotics (Severe constipation)  Vicodin (Urinary retention)   Medications:  (Selected)   Prescriptions  Prescribed  aspirin 81 mg oral tablet: See Instructions, Instructions: 1 tab(s) po every third day, # 100 tab(s), 0 Refill(s), Type: Maintenance, OTC (Rx)  traZODone 50 mg oral tablet: 1-2  tab(s), po, hs, PRN: for insomnia, # 60 EA, 1 Refill(s), Type: Maintenance, Pharmacy: [facility] #[id number], 1-2  tab(s) po hs,PRN:for insomnia  Documented Medications  Documented  Cozaar 100 mg oral tablet: 1 tab(s) ( 100 mg ), po, daily, 0 Refill(s)  Fish Oil: ( 1,000 mg ),  "po, cap(s), 0 Refill(s), Type: Maintenance  Metoprolol Succinate  mg oral tablet, extended release: 1 tab(s) ( 100 mg ), PO, Daily, 0 Refill(s)  Saw Palmetto oral capsule: 0 Refill(s), Type: Maintenance  amlodipine 10 mg oral tablet: 1 tab(s), PO, Daily, # 30 tab(s), 0 Refill(s)  furosemide 20 mg oral tablet: 1 tab(s) ( 20 mg ), po, bid, 0 Refill(s)  rosuvastatin 10 mg oral tablet: 1 tab(s) ( 10 mg ), po, hs, 0 Refill(s), Type: Maintenance  Suspended  potassium chloride: ( 10 mEq ), daily, 0 Refill(s), Type: Maintenance   Problem list:    All Problems (Selected)  Latent tuberculosis / SNOMED CT 69244348 / Confirmed  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  Comments:  2010 CDT 2:58 PM CDT - Jaxon OLEARY, Olivia  \"ruptured blood vessel\"  HTN (Hypertension) (401.9): Onset in  at 53 years.  Latent tuberculosis (96180279)  Coronary Artery Disease (414.00)  Resolved  Herpes Zoster (053.9): Onset on 2009 at 73 years.  Resolved.   Family History:    Cancer  Brother ()  Comments:  2010 3:29 PM - Jaxon OLEARY, Olivia  Lung  Brother  Comments:  2010 3:29 PM - Olivia Coates CMA  Oral  Brother ()  Comments:  2010 3:29 PM - Olivia Coates CMA  colon  Brother ()  Leukemia  Sister (Adrianna, )  Lymphoma  Sister  Rheumatoid " arthritis  Brother  TB - Tuberculosis  Father ()  Mother ()  HTN - Hypertension  Brother ()  Sister (Adrianna, )  Sister  Brother ()        Physical Examination   Vital Signs   3/31/2017 8:59 AM CDT Temperature Tympanic 97.5 DegF  LOW    Peripheral Pulse Rate 72 bpm    Pulse Site Radial artery    Respiratory Rate 16 br/min    Systolic Blood Pressure 180 mmHg  HI    Diastolic Blood Pressure 86 mmHg    Mean Arterial Pressure 117 mmHg    BP Site Right arm    Oxygen Saturation 97 %      Measurements from flowsheet : Measurements   3/31/2017 8:59 AM CDT Height Measured - Standard 68 in    Weight Measured - Standard 193 lb    BSA 2.05 m2    Body Mass Index 29.34 kg/m2      General:  No acute distress.    HENT:  Tympanic membranes are clear, No pharyngeal erythema, No sinus tenderness, nares are patent, in right nare there is thinning and small scabs along nasal septum,  left nare is clear, no clots present.    Neck:  Supple, Non-tender, No lymphadenopathy.    Respiratory:  Lungs are clear to auscultation.    Cardiovascular:  Normal rate, Regular rhythm, grade 4/6 holosystolic murmur.       Impression and Plan   Diagnosis     Epistaxis (NGL22-FR R04.0).     Course:  Improving, Progressing as expected.    Summary:  stay off the Plavix, restart aspirin q 3days next week  apply bactroban intranasally for a week  will check CBC and INR today  follow up as needed.    Orders     Orders   Pharmacy:  mupirocin 2% topical ointment (Prescribe): 1 annmarie, nasal, bid, x 7 day(s), # 30 gm, 0 Refill(s), Type: Maintenance, Pharmacy: [facility] #[id number], 1 annmarie nasal bid,x7 day(s).     Orders   Requests (Lab):  PT (Request) (Order): Priority: Urgent, Epistaxis  CBC with differential/platelet  (Request) (Order): Priority: Urgent, Epistaxis.     Orders   Charges (Evaluation and Management):  [id number] office outpatient visit 15 minutes (Charge) (Order): Quantity: 1, Epistaxis.

## 2022-02-15 NOTE — NURSING NOTE
Comprehensive Intake Entered On:  3/11/2019 12:45 PM CDT    Performed On:  3/11/2019 12:37 PM CDT by Vishnu Funes CMA               Summary   Chief Complaint :   Pt here for fall he had on some ice today.  Pt is c/o Left hip pain and left elbow pain.   Height Measured :   68 in(Converted to: 5 ft 8 in, 172.72 cm)    Systolic Blood Pressure :   162 mmHg (HI)    Diastolic Blood Pressure :   70 mmHg   Mean Arterial Pressure :   101 mmHg   Peripheral Pulse Rate :   72 bpm   Vital Signs Comments :   unable to get weight.   BP Site :   Right arm   Pulse Site :   Radial artery   Temperature Tympanic :   97.4 DegF(Converted to: 36.3 DegC)  (LOW)    Respiratory Rate :   20 br/min   Oxygen Saturation :   92 % (LOW)    Vishnu Funes CMA - 3/11/2019 12:37 PM CDT   Health Status   Allergies Verified? :   Yes   Medication History Verified? :   Yes   Medical History Verified? :   Yes   Pre-Visit Planning Status :   Not completed   Tobacco Use? :   Never smoker   Vishnu Funes CMA - 3/11/2019 12:37 PM CDT   Meds / Allergies   (As Of: 3/11/2019 12:45:08 PM CDT)   Allergies (Active)   Narcotics  Estimated Onset Date:   Unspecified ; Reactions:   severe constipation ; Created By:   Schoen RN, Alissa; Reaction Status:   Active ; Category:   Drug ; Substance:   Narcotics ; Type:   Allergy ; Updated By:   Schoen RN, Alissa; Reviewed Date:   2/8/2019 12:19 PM CST      Vicodin  Estimated Onset Date:   <not entered> 2011 ; Reactions:   Urinary retention ; Created By:   Berhane Krause PA-C; Reaction Status:   Active ; Category:   Drug ; Substance:   Vicodin ; Type:   Allergy ; Updated By:   Berhane Krause PA-C; Reviewed Date:   2/8/2019 12:19 PM CST        Medication List   (As Of: 3/11/2019 12:45:08 PM CDT)   Prescription/Discharge Order    tamsulosin  :   tamsulosin ; Status:   Prescribed ; Ordered As Mnemonic:   tamsulosin 0.4 mg oral capsule ; Simple Display Line:   0.4 mg, 1 cap(s), po, daily, 90 cap(s), 3 Refill(s) ; Ordering  Provider:   Berhane Krause PA-C; Catalog Code:   tamsulosin ; Order Dt/Tm:   1/15/2019 10:52:54 AM          finasteride  :   finasteride ; Status:   Prescribed ; Ordered As Mnemonic:   finasteride 5 mg oral tablet ; Simple Display Line:   5 mg, 1 tab(s), PO, Daily, 90 tab(s), 3 Refill(s) ; Ordering Provider:   Berhane Krause PA-C; Catalog Code:   finasteride ; Order Dt/Tm:   1/15/2019 10:49:10 AM          FLUoxetine  :   FLUoxetine ; Status:   Processing ; Ordered As Mnemonic:   FLUoxetine 20 mg oral capsule ; Simple Display Line:   40 mg, 2 cap(s), po, daily, 90 cap(s), 3 Refill(s) ; Ordering Provider:   Dr Danika Esposito; Action Display:   Modify ; Catalog Code:   FLUoxetine ; Order Dt/Tm:   3/11/2019 12:41:35 PM          metoprolol  :   metoprolol ; Status:   Prescribed ; Ordered As Mnemonic:   metoprolol succinate 50 mg oral tablet, extended release ; Simple Display Line:   50 mg, 1 tab(s), PO, Daily, 90 tab(s), 3 Refill(s) ; Ordering Provider:   Berhane Krause PA-C; Catalog Code:   metoprolol ; Order Dt/Tm:   1/15/2019 10:48:42 AM          losartan  :   losartan ; Status:   Prescribed ; Ordered As Mnemonic:   losartan 25 mg oral tablet ; Simple Display Line:   25 mg, 1 tab(s), PO, Daily, 90 tab(s), 3 Refill(s) ; Ordering Provider:   Berhane Krause PA-C; Catalog Code:   losartan ; Order Dt/Tm:   3/19/2018 8:12:48 AM          ferrous gluconate  :   ferrous gluconate ; Status:   Prescribed ; Ordered As Mnemonic:   ferrous gluconate 324 mg (37.5 mg elemental iron) oral tablet ; Simple Display Line:   324 mg, 1 tab(s), po, daily, 90 tab(s), 3 Refill(s) ; Ordering Provider:   Berhane Krause PA-C; Catalog Code:   ferrous gluconate ; Order Dt/Tm:   3/19/2018 8:09:39 AM          furosemide  :   furosemide ; Status:   Prescribed ; Ordered As Mnemonic:   furosemide 40 mg oral tablet ; Simple Display Line:   See Instructions, 1.5 tabs(60 mg) PO daily., 135 tab(s), 1 Refill(s) ; Ordering Provider:   Berhane Krause PA-C  SUSU; Catalog Code:   furosemide ; Order Dt/Tm:   3/19/2018 8:07:38 AM          aspirin  :   aspirin ; Status:   Prescribed ; Ordered As Mnemonic:   aspirin 81 mg oral tablet ; Simple Display Line:   162 mg, 2 tab(s), po, daily, 100 tab(s), 0 Refill(s) ; Ordering Provider:   Berhane Krause PA-C; Catalog Code:   aspirin ; Order Dt/Tm:   9/2/2011 3:37:00 PM            Home Meds    potassium chloride  :   potassium chloride ; Status:   Suspended ; Ordered As Mnemonic:   potassium chloride ; Simple Display Line:   10 mEq, daily ; Catalog Code:   potassium chloride ; Order Dt/Tm:   8/18/2010 2:52:13 PM ; Comment:   every other day          atorvastatin  :   atorvastatin ; Status:   Documented ; Ordered As Mnemonic:   atorvastatin 40 mg oral tablet ; Simple Display Line:   40 mg, 1 tab(s), Oral, daily, Per Cardio note 8/8/2018, 0 Refill(s) ; Catalog Code:   atorvastatin ; Order Dt/Tm:   8/24/2018 2:27:27 PM          amLODIPine  :   amLODIPine ; Status:   Documented ; Ordered As Mnemonic:   amLODIPine 5 mg oral tablet ; Simple Display Line:   5 mg, 1 tab(s), PO, Daily, 30 tab(s), 0 Refill(s) ; Catalog Code:   amLODIPine ; Order Dt/Tm:   5/18/2018 9:02:55 AM          acetaminophen  :   acetaminophen ; Status:   Documented ; Ordered As Mnemonic:   acetaminophen 325 mg oral capsule ; Simple Display Line:   325 mg, 1 cap(s), po, tid, 0 Refill(s) ; Catalog Code:   acetaminophen ; Order Dt/Tm:   1/18/2018 10:13:21 AM            Social History   Social History   (As Of: 3/11/2019 12:45:08 PM CDT)   Alcohol:  Current      Current   Comments:  8/18/2010 2:59 PM - Olivia Coates CMA: rare   (Last Updated: 8/18/2010 2:59:42 PM CDT by Olivia Coates CMA)          Tobacco:  Denies Tobacco Use      (Last Updated: 8/18/2010 2:59:53 PM CDT by Olivia Coates CMA )         Substance Abuse:  Denies Substance Abuse      (Last Updated: 3/14/2012 3:14:16 PM CDT by Adrian Argueta MD )         Home and Environment:        Marital status: .    (Last Updated: 9/5/2017 2:18:47 PM CDT by Claudia Larson MA)

## 2022-02-15 NOTE — TELEPHONE ENCOUNTER
---------------------  From: Nicole Metcalf LPN (Phone Messages Pool ([id number]_[facility]))   To: Advanced Practice Provider Tribune ([id number]_[facility]);     Sent: 10/7/2019 5:09:49 PM CDT  Subject: refills     [facility] out of clinic until 10/9    Phone Message    PCP:   [facility]      Time of Call:  4:55pm       Person Calling:  pt daughter Tammy  Phone number:  [phone]    Note:   Tammy LM stating pt needs refills of lasix 80mg and potassium chloride 20mg.  Please advise as both these medications are only documented in med list.    Per med list:  urosemide 40mg take 80mg in AM and 80mg in afternoon (per cardio from 7/3/19  potassium chloride 20mEq 1 tab PO BID    Last office visit and reason:  9/26/19 knee bursitis---------------------  From: Katie Valentino (Advanced Practice Provider Pool ([id number]_[facility]))   To: Phone [employer] Pool ([id number]_[place] [facility]);     Sent: 10/8/2019 8:59:15 AM CDT  Subject: RE: refills     I would recommend the cardiologist be contacted for refills as they initiated the prescription. That is a big dose of furosemide, that way they confirm the continued dose. If reaching cardiology is difficult, ok for 30 day refill of both till cardiology is reachedLeft message for a call back.12:04pm Tammy, pt's daughter returns call. She asks that we contact her after 3:15pm today.3:42pm Called and spoke with Tammy. I did suggest that she reach out to cardiology as requested by [facility] for refills. If they have trouble with this asked that she call back and let us know. Pt's daughter agrees with this plan and will try to reach Dr Lawrence.

## 2022-02-15 NOTE — TELEPHONE ENCOUNTER
---------------------  From: Yamila/Maria Elena PALMER (eRx Pool ([id number]_[facility]))   To: LINNETTE Message Pool ([id number]_[facility]);     Sent: 3/25/2020 8:47:17 AM CDT  Subject: Losartan 50mg      Received fax from [facility] for Losartan 50mg tabs. Per chart on 8/16/19, this med was only documented. Please Advise.---------------------  From: Jennifer Manrique   To: Berhane Krause PA-C;     Sent: 3/25/2020 1:24:05 PM CDT  Subject: FW: Losartan 50mg---------------------  From: Berhane Krause PA-C   To: Jennifer Manrique;     Sent: 3/25/2020 2:14:38 PM CDT  Subject: RE: Losartan 50mg      Rx sent inPt notified.

## 2022-02-15 NOTE — NURSING NOTE
Depression Screening Entered On:  1/17/2020 8:56 AM CST    Performed On:  1/14/2020 8:56 AM CST by Olivia Coates CMA               Depression Screening   Little Interest - Pleasure in Activities :   More than half the days   Feeling Down, Depressed, Hopeless :   Not at all   Initial Depression Screen Score :   2    Trouble Falling or Staying Asleep :   More than half the days   Feeling Tired or Little Energy :   Nearly every day   Poor Appetite or Overeating :   Not at all   Feeling Bad About Yourself :   Several days   Trouble Concentrating :   Not at all   Moving or Speaking Slowly :   Not at all   Thoughts Better Off Dead or Hurting Self :   Several days   Detailed Depression Screen Score :   7    Total Depression Screen Score :   9    ASHLEY Difficulty with Work, Home, Others :   Extremely difficult   Olivia Coates CMA - 1/17/2020 8:56 AM CST

## 2022-02-15 NOTE — PROGRESS NOTES
Patient:   PUMA BAEZ            MRN: [id number]            FIN: [id number]               Age:   81 years     Sex:  Male     :  1935   Associated Diagnoses:   Pre-operative examination; Cataract   Author:   Berhane Krause PA-C      Preoperative Information   Procedure/ Case: bilateral cataracts   Location scheduled: [facility]   Date/ Time scheduled: 2017 8:00:00 AM   surgeon= Sina      Chief Complaint   2017 9:57 AM CDT    Pt here for Preop Px for cataract surgery by Dr Andino at [facility].  Left eye DOS  and Right eye DOS       Review of Systems   Constitutional:  Negative.       Health Status   Allergies:    Allergic Reactions (Selected)  Severity Not Documented  Narcotics (Severe constipation)  Vicodin (Urinary retention)   Medications:  (Selected)   Prescriptions  Prescribed  aspirin 81 mg oral tablet: See Instructions, Instructions: 1 tab(s) po every third day, # 100 tab(s), 0 Refill(s), Type: Maintenance, OTC (Rx)  traZODone 50 mg oral tablet: 1-2  tab(s), po, hs, PRN: for insomnia, # 60 EA, 1 Refill(s), Type: Maintenance, Pharmacy: [facility] #[id number], 1-2  tab(s) po hs,PRN:for insomnia  Documented Medications  Documented  Cozaar 100 mg oral tablet: 1 tab(s) ( 100 mg ), po, daily, 0 Refill(s)  Fish Oil: ( 1,000 mg ), po, cap(s), 0 Refill(s), Type: Maintenance  Metoprolol Succinate  mg oral tablet, extended release: 1 tab(s) ( 100 mg ), PO, Daily, 0 Refill(s)  Saw Palmetto oral capsule: 0 Refill(s), Type: Maintenance  amlodipine 10 mg oral tablet: 1 tab(s), PO, Daily, # 30 tab(s), 0 Refill(s)  atorvastatin 40 mg oral tablet: 1 tab(s) ( 40 mg ), PO, Daily, # 30 tab(s), 0 Refill(s), Type: Maintenance  furosemide 20 mg oral tablet: 1 tab(s) ( 20 mg ), po, bid, 0 Refill(s)  Suspended  potassium chloride: ( 10 mEq ), daily, 0 Refill(s), Type: Maintenance   Problem list:    All Problems  Latent tuberculosis / SNOMED CT 79655008 / Confirmed  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  "72118220 / Confirmed  HTN (Hypertension) / ICD-9-.9 / Confirmed  MI (Myocardial Infarction) / ICD-9-.90 / Confirmed  Coronary Artery Disease / ICD-9-.00 / Confirmed  CHF (Congestive Heart Failure) / ICD-9-.0 / Confirmed  Barretts Esophagus / ICD-9-.85 / Confirmed  Inactive: CABG / ICD-9-CM V45.81  Resolved: Herpes Zoster / ICD-9-.9      Histories   Past Medical History:    Active  CHF (Congestive Heart Failure) (428.0): Onset in the month of 2008 at 73 years  MI (Myocardial Infarction) (410.90): Onset in the month of 2007 at 72 years  Barretts Esophagus (530.85): Onset in the month of 2003 at 67 years  Traumatic blindness of left eye (79742980): Onset in  at 64 years.  Comments:  2010 CDT 2:58 PM CDT - Renard Coates CMA  \"ruptured blood vessel\"  HTN (Hypertension) (401.9): Onset in  at 53 years.  Latent tuberculosis (43360355)  Coronary Artery Disease (414.00)  Resolved  Herpes Zoster (053.9): Onset on 2009 at 73 years.  Resolved.   Family History:    Cancer  Brother ()  Comments:  2010 3:29 PM - Renard Coates CMA  Lung  Brother  Comments:  2010 3:29 PM - Renard Coates CMA  Oral  Brother ()  Comments:  2010 3:29 PM - Renard Coates CMA  colon  Brother ()  Leukemia  Sister (Adrianna, )  Lymphoma  Sister  Rheumatoid arthritis  Brother  TB - Tuberculosis  Father ()  Mother ()  HTN - Hypertension  Brother ()  Sister (Adrianna, )  Sister  Brother ()     Procedure history:    Coronary angiogram (26371149) on 3/6/2017 at 81 Years.  Repair of recurrent ventral hernia w/mesh (007755267) on 2012 at 76 Years.  Repair of epigastric hernia (399957955) on 3/15/2012 at 76 Years.  right shoulder rotator cuff repair in the month of 2010 at 74 Years.  Comments:  2010 3:06 PM - RENARD GORMAN  Colonoscopy (497477425) in the month of 3/2009 at 73 Years.  Comments:  2010 3:02 " PM - RENARD GORMAN  Normal  CABG x 2 - Coronary artery bypass grafts x 2 ([id number]) in the month of 11/2007 at 72 Years.  laser for photocoagulation in the month of 3/2007 at 71 Years.  EGD in the month of 1/2003 at 67 Years.  Flexible sigmoidoscopy (79383847) on 10/12/2000 at 65 Years.  Flexible sigmoidoscopy (25200459) on 10/19/1993 at 58 Years.  right shoulder decompression in 1989 at 54 Years.  Wrist Fracture in 1987 at 52 Years.  Comments:  8/18/2010 3:07 PM - RENARD GORMAN  R  Compression fracutre of foot in 1978 at 43 Years.  L ankle compression in 1975 at 40 Years.  Bilateral vasectomy (270535718) in 1975 at 40 Years.  Rotator cuff repair (641842945) in 1964 at 29 Years.  Comments:  2/24/2012 10:25 AM - Berhane Krause PA-C.  left shoulder  Hernia repair (48080050).  Comments:  8/18/2010 3:05 PM - RENARD GORMAN  Bilateral, Laparoscopy  vitreoretinal surgery.   Social History:        Alcohol Assessment: Current            Current                     Comments:                      08/18/2010 - Renard Coates CMA                     rare      Tobacco Assessment: Denies Tobacco Use      Substance Abuse Assessment: Denies Substance Abuse      Home and Environment Assessment            Marital status: .       Has  no history of anemia.  Has  no history of DVT or pulmonary embolism.  Has a  personal history of bleeding problems.   Has  no personal history of anesthesia reactions.  Has no  personal history of sleep apnea  Patient does not have active tuberculosis.    S/he has  taken aspirin or aspirin-containing products in the last week.     S/he  has not taken Plavix (Clopidogrel) in the last 2 weeks.    S/he  has not taken warfarin in the past week.    S/he  has not been on corticosteroids for more than 2 weeks recently.   S/he is not DNR before, during or after surgery.          Chest pain / SOB walking up 2 flights of steps? No  Pain in neck or jaw? No  CAD MI?  YES  Afib? No  Heart Failure?  YES  Asthma  or Bronchitis? No  Diabetes? No       Insulin/Orals?   Seizure Disorder? No  CKD? No  Thyroid Disease?No  Liver Disease?No  CVA?No      Physical Examination   Vital Signs   6/22/2017 9:57 AM CDT Temperature Tympanic 97.8 DegF  LOW    Peripheral Pulse Rate 64 bpm    Pulse Site Radial artery    Respiratory Rate 16 br/min    Systolic Blood Pressure 192 mmHg  HI    Diastolic Blood Pressure 92 mmHg  HI    Mean Arterial Pressure 125 mmHg    BP Site Right arm    BP Systolic Repeat 176 mmHg    BP Diastolic Repeat 88 mmHg    BP Site Repeat Right arm      Measurements from flowsheet : Measurements   6/22/2017 9:57 AM CDT Height Measured - Standard 68 in    Weight Measured - Standard 190 lb    BSA 2.03 m2    Body Mass Index 28.89 kg/m2      General:  No acute distress.    Eye:  Pupils are equal, round and reactive to light, Extraocular movements are intact.    HENT:  Tympanic membranes are clear, No pharyngeal erythema, No sinus tenderness.    Neck:  Supple, Non-tender, No lymphadenopathy.    Respiratory:  Lungs are clear to auscultation.    Cardiovascular:  Normal rate, grade 4/6 holosystolic murmur.    Gastrointestinal:  Soft, Non-tender, Non-distended, Normal bowel sounds, No organomegaly.    Psychiatric:  Appropriate mood & affect.       Review / Management   Results review:  Lab results   6/22/2017 10:33 AM CDT Sodium Level 140 mmol/L    Potassium Level 4.6 mmol/L    Chloride Level 104 mmol/L    CO2 Level 28 mmol/L    Glucose Level 97 mg/dL    BUN 15 mg/dL    Creatinine Level 1.13 mg/dL  HI    BUN/Creat Ratio 13    eGFR 61 mL/min/1.73m2    eGFR African American 70 mL/min/1.73m2    Calcium Level 9.5 mg/dL     .    ECG interpretation:  EKG from July 26, 2016 shows sinus arrhythmia.       Impression and Plan   Diagnosis     Pre-operative examination ([id number] Z01.818).     Cataract (JSG72-WF H26.9).     Orders     Orders   Lab (Gen Lab  Reference Lab):  Basic Metabolic Panel* ([facility]) (Order): Specimen Type:  Serum, Collection Date: 6/22/2017 10:15 AM CDT.     Orders   Charges (Evaluation and Management):  [id number] office outpatient visit 25 minutes (Charge) (Order): Quantity: 1, Pre-operative examination  Cataract.       Approved for surgery without restrictions, BP is not optimally controlled but is satisfactory for this patient.

## 2022-02-15 NOTE — TELEPHONE ENCOUNTER
Entered by Jennifer Manrique on September 11, 2019 2:24:03 PM CDT  Done       ---------------------  From: Franklin MG, Asya SALAZAR   To: [employer] ([id number]_[place] [facility] [place]);     Sent: 9/9/2019 9:51:13 PM CDT  Subject: General Message     Cindy, can you add comadin to his med list. Not sure how to do that correctly.  Also Lovenox

## 2022-02-15 NOTE — PROGRESS NOTES
"   Patient:   PUMA BAEZ            MRN: [id number]            FIN: [id number]               Age:   82 years     Sex:  Male     :  1935   Associated Diagnoses:   Acute deep vein thrombosis (DVT); Peripheral edema; Anemia, blood loss; Embolic cerebral infarction; Nonrheumatic mitral valve regurgitation; Hospital discharge follow-up   Author:   Berhane Krause PA-C      Chief Complaint   2018 10:03 AM CST   Pt here for Post hospital visit for CVA and Mitral clip placement.  Pt states he \"hurts all over\"  Pt also has pain in calves and edema in both feet that started yesterday.      History of Present Illness   Chief complaint and symptoms noted above and confirmed with patient   He had MitraClip place  but then developed MI followed by DVT followed by embolic stroke with left hemiparesis  he spent a month at [facility]  his daughter (Alma Delia) is living with him  they are arranging [facility] to come and do home therapy  he had swelling in his right lower leg today (it has been wrapped every day but not today)  he has some constipation due to iron supplement, he is advised to start Miralax         Review of Systems   Constitutional:  Weakness, Fatigue, No fever.    Respiratory:  No shortness of breath.    Cardiovascular:  Peripheral edema, No chest pain.    Gastrointestinal:  Constipation.       Health Status   Allergies:    Allergic Reactions (Selected)  Severity Not Documented  Narcotics (Severe constipation)  Vicodin (Urinary retention)   Medications:  (Selected)   Prescriptions  Prescribed  aspirin 81 mg oral tablet: See Instructions, Instructions: 1 tab(s) po every third day, # 100 tab(s), 0 Refill(s), Type: Maintenance, OTC (Rx)  Documented Medications  Documented  FLUoxetine 20 mg oral capsule: 1 cap(s) ( 20 mg ), po, daily, 0 Refill(s), Type: Maintenance  Fish Oil: ( 1,000 mg ), po, cap(s), 0 Refill(s), Type: Maintenance  acetaminophen 325 mg oral capsule: 1 cap(s) ( 325 mg ), po, tid, 0 " Refill(s), Type: Maintenance  amLODIPine 5 mg oral tablet: 1 tab(s) ( 5 mg ), po, bid, 0 Refill(s), Type: Maintenance  atorvastatin 40 mg oral tablet: 1 tab(s) ( 40 mg ), PO, Daily, # 30 tab(s), 0 Refill(s), Type: Maintenance  clotrimazole 1% topical cream: 1 annmarie, top, bid, 0 Refill(s), Type: Maintenance  famotidine 20 mg oral tablet: 1 tab(s) ( 20 mg ), po, daily, 0 Refill(s), Type: Maintenance  ferrous gluconate 324 mg (37.5 mg elemental iron) oral tablet: 1 tab(s) ( 324 mg ), po, daily, 0 Refill(s), Type: Maintenance  finasteride 5 mg oral tablet: 1 tab(s) ( 5 mg ), PO, Daily, # 30 tab(s), 0 Refill(s), Type: Maintenance  furosemide 20 mg oral tablet: 1 tab(s) ( 20 mg ), po, bid, 0 Refill(s)  gabapentin 300 mg oral capsule: 1 cap(s) ( 300 mg ), po, qhs, 0 Refill(s), Type: Maintenance  losartan 25 mg oral tablet: 1 tab(s) ( 25 mg ), PO, Daily, # 30 tab(s), 0 Refill(s), Type: Maintenance  melatonin 3 mg oral tablet: 1 tab(s) ( 3 mg ), po, daily, 0 Refill(s), Type: Maintenance  metoprolol succinate 50 mg oral tablet, extended release: 1 tab(s) ( 50 mg ), PO, Daily, # 30 tab(s), 0 Refill(s), Type: Maintenance  polyethylene glycol 3350 oral powder for reconstitution: ( 17 gm ), po, daily, 0 Refill(s), Type: Maintenance  tamsulosin 0.4 mg oral capsule: 1 cap(s) ( 0.4 mg ), po, daily, 0 Refill(s), Type: Maintenance  warfarin 3 mg oral tablet: 1 tab(s) ( 3 mg ), PO, Daily, # 30 tab(s), 0 Refill(s), Type: Maintenance  Suspended  potassium chloride: ( 10 mEq ), daily, 0 Refill(s), Type: Maintenance   Problem list:    All Problems (Selected)  Embolic cerebral infarction / SNOMED CT 9618360659 / Confirmed  Bilateral inguinal hernia / SNOMED CT 337207813 / Confirmed  Hematoma / SNOMED CT 3226604614 / Confirmed  Abnormal glucose / SNOMED CT 777798998 / Confirmed  Latent tuberculosis / SNOMED CT 94366380 / Confirmed  Anemia, blood loss / SNOMED CT 5576772194 / Confirmed  Hernia, ventral / SNOMED CT 1183601766 / Confirmed  Acute  "CVA (cerebrovascular accident) due to Hgb-S disease / SNOMED CT 6363287397 / Confirmed  Acute deep vein thrombosis (DVT) / SNOMED CT 5080732794 / Confirmed  Nonrheumatic mitral valve regurgitation / SNOMED CT 822509987 / Confirmed  Epistaxis / SNOMED CT 292560858 / Confirmed  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 64173176 / Confirmed  Acute posthemorrhagic anemia / SNOMED CT 212634753 / Confirmed  HTN (Hypertension) / ICD-9-.9 / Confirmed  MI (Myocardial Infarction) / ICD-9-.90 / Confirmed  Coronary Artery Disease / ICD-9-.00 / Confirmed  CHF (Congestive Heart Failure) / ICD-9-.0 / Confirmed  Hematuria / SNOMED CT 299585723 / Confirmed  Foot pain / SNOMED CT 687008034 / Confirmed  Barretts Esophagus / ICD-9-.85 / Confirmed  Acute kidney injury / SNOMED CT 51297505 / Confirmed  Hyperlipidemia / SNOMED CT 99736261 / Confirmed  Tuberculosis / SNOMED CT 81845490 / Confirmed      Histories   Past Medical History:    Active  CHF (Congestive Heart Failure) (428.0): Onset in the month of 11/2008 at 73 years  MI (Myocardial Infarction) (410.90): Onset in the month of 11/2007 at 72 years  Barretts Esophagus (530.85): Onset in the month of 1/2003 at 67 years  Traumatic blindness of left eye (43612595): Onset in 1999 at 64 years.  Comments:  8/18/2010 CDT 2:58 PM CDT - Olivia Coates CMA  \"ruptured blood vessel\"  HTN (Hypertension) (401.9): Onset in 1989 at 53 years.  Latent tuberculosis (24165061)  Coronary Artery Disease (414.00)  Acute posthemorrhagic anemia (855800134)  Hyperlipidemia (15500699)  Epistaxis (935225826)  Abnormal glucose (831540595)  Foot pain (817081211)  Nonrheumatic mitral valve regurgitation (618762037)  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 (5776676180)  Hematuria (674382838)  Hematoma (1163713391)  Acute kidney injury (69720436)  Acute deep vein thrombosis (DVT) (5441853965)  Comments:  1/18/2018 CST 9:57 AM TRISTIN - Thuy Hopkins  Right lower extremity. "  Iliac vein.  Embolic cerebral infarction (2887438331)  Anemia, blood loss (4432129240)  Tuberculosis (92006084)  Comments:  2018 CST 10:02 AM Thuy Topete  Patient tests positive.  See family history.  Bilateral inguinal hernia (979675855)  Hernia, ventral (4139900421)  Resolved  Herpes Zoster (053.9): Onset on 2009 at 73 years.  Resolved.  Age-related cataract of both eyes (141746494):  Resolved.  Ankle fracture (47828184):  Resolved.  Comments:  2018 CST 10:06 AM Thuy Topete  Right  Burst blood vessel in eye (122816269):  Resolved.  Comments:  2018 CST 10:05 AM Thuy Topete  Left  H/O fracture of wrist (8971000355):  Resolved.  Comments:  2018 CST 10:08 AM TRISTIN Hopkins  Thuy  Right   Family History:    Cancer  Brother ()  Comments:  2010 3:29 PM - Jaxon OLEARYRenard  Lung  Brother  Comments:  2010 3:29 PM - Renard Coates CMA  Oral  Brother ()  Comments:  2010 3:29 PM - Jaxon OLEARY Renard  colon  Brother ()  Leukemia  Sister (Adrianna, )  Lymphoma  Sister  Rheumatoid arthritis  Brother  TB - Tuberculosis  Father ()  Mother ()  HTN - Hypertension  Brother ()  Sister (Adrianna, )  Sister  Brother ()     Procedure history:    Extracapsular cataract extraction and insertion of intraocular lens (697018771) on 2017 at 81 Years.  Comments:  2017 12:37 PM - Dalila Rondon  Right.  Extracapsular cataract extraction and insertion of intraocular lens (857546478) on 2017 at 81 Years.  Comments:  2017 1:44 PM - Dalila Rondon  Left.  Coronary angiogram (06893072) on 3/6/2017 at 81 Years.  Repair of recurrent ventral hernia w/mesh (281338845) on 2012 at 76 Years.  Repair of epigastric hernia (128537313) on 3/15/2012 at 76 Years.  right shoulder rotator cuff repair in the month of 2010 at 74 Years.  Comments:  2010 3:06 PM - RENARD GORMAN  Colonoscopy (717320716) in the month of  3/2009 at 73 Years.  Comments:  8/18/2010 3:02 PM - RENARD GORMAN  Normal  CABG x 2 - Coronary artery bypass grafts x 2 (059890049) in the month of 11/2007 at 72 Years.  laser for photocoagulation in the month of 3/2007 at 71 Years.  EGD in the month of 1/2003 at 67 Years.  Flexible sigmoidoscopy (43195469) on 10/12/2000 at 65 Years.  Flexible sigmoidoscopy (56749346) on 10/19/1993 at 58 Years.  right shoulder decompression in 1989 at 54 Years.  Wrist Fracture in 1987 at 52 Years.  Comments:  8/18/2010 3:07 PM - RENARD GORMAN  R  Compression fracutre of foot in 1978 at 43 Years.  L ankle compression in 1975 at 40 Years.  Bilateral vasectomy (274188740) in 1975 at 40 Years.  Rotator cuff repair (103533332) in 1964 at 29 Years.  Comments:  2/24/2012 10:25 AM - Jimi MG, Berhane DUGAN.  left shoulder  Hernia repair (28522176).  Comments:  8/18/2010 3:05 PM - RENARD GORMAN  Bilateral, Laparoscopy  vitreoretinal surgery.   Social History:        Alcohol Assessment: Current            Current                     Comments:                      08/18/2010 - Renard Coates CMA                     rare      Tobacco Assessment: Denies Tobacco Use      Substance Abuse Assessment: Denies Substance Abuse      Home and Environment Assessment            Marital status: .        Physical Examination   Vital Signs   1/18/2018 10:03 AM CST Temperature Tympanic 98.2 DegF    Peripheral Pulse Rate 64 bpm    Pulse Site Radial artery    Respiratory Rate 16 br/min    Systolic Blood Pressure 126 mmHg    Diastolic Blood Pressure 80 mmHg    Mean Arterial Pressure 95 mmHg    BP Site Right arm    Oxygen Saturation 96 %    Vital Signs Comments unable to get weight today      Measurements from flowsheet : Measurements   1/18/2018 10:03 AM CST   Height Measured - Standard                68 in     General:  No acute distress.    Respiratory:  Lungs are clear to auscultation.    Cardiovascular:  Normal rate, Regular rhythm, No murmur, +2 edema  of right foot and ankle.    Cognition and Speech:  Speech clear and coherent.    Psychiatric:  Appropriate mood & affect.       Review / Management   Documentation reviewed:  Records from referring facility.       Impression and Plan   Diagnosis     Acute deep vein thrombosis (DVT) (PRH10-WC I82.409).     Peripheral edema (ZOH87-YZ R60.9).     Anemia, blood loss (ITU53-DA D50.0).     Embolic cerebral infarction (XBL52-FH I63.40).     Nonrheumatic mitral valve regurgitation (XGX86-TY I34.0).     Hospital discharge follow-up (GGH11-UK Z09).     Summary:  will check INR, BMP, CBC today; restart wraps on legs, if edema persists will double furosemide dose for 3 days  continue with therapy, has follow up appts with urology and neurology the first week of Feb.    Orders     Orders   Pharmacy:  warfarin 3 mg oral tablet (Prescribe): 1 tab(s) ( 3 mg ), PO, Daily, # 90 tab(s), 1 Refill(s), Type: Maintenance, Pharmacy: [id number], 1 tab(s) po daily  warfarin 3 mg oral tablet (Discontinue).     Orders   Lab (Gen Lab  Reference Lab):  Prothrombin time-INR* ([facility]) (Order): Specimen Type: Blood, Collection Date: 1/18/2018 10:42 AM CST  CBC (includes diff/plt)* ([facility]) (Order): Specimen Type: Blood, Collection Date: 1/18/2018 10:42 AM CST  Basic Metabolic Panel* ([facility]) (Order): Specimen Type: Serum, Collection Date: 1/18/2018 10:42 AM CST.     Orders   Charges (Evaluation and Management):  31829 office outpatient visit 25 minutes (Charge) (Order): Quantity: 1, Embolic cerebral infarction  Anemia, blood loss  Nonrheumatic mitral valve regurgitation  Acute deep vein thrombosis (DVT)  Peripheral edema  Hospital discharge follow-up.

## 2022-02-15 NOTE — PROGRESS NOTES
Patient:   PUMA BAEZ            MRN: [id number]            FIN: [id number]               Age:   82 years     Sex:  Male     :  1935   Associated Diagnoses:   HTN (hypertension); S/P mitral valve clip implantation; CHF (congestive heart failure)   Author:   Berhane Krause PA-C      Visit Information   Accompanied by:  daughter.       Chief Complaint   2018 8:59 AM CDT    Pt here for a Post Op visit for IVF filter removal at [facility] 5-15-18.      History of Present Illness   Chief complaint and symptoms noted above and confirmed with patient   as above, he was also started on amlodipine  the procedure went well    he still feels SOB at some times but overall that seems to be improving    [facility]  I asked [facility] to  [facility]      Review of Systems   Constitutional:  Negative.    Respiratory:  Shortness of breath.    Cardiovascular:  Negative.       Health Status   Allergies:    Allergic Reactions (Selected)  Severity Not Documented  Narcotics (Severe constipation)  Vicodin (Urinary retention)   Medications:  (Selected)   Prescriptions  Prescribed  FLUoxetine 20 mg oral capsule: 1 cap(s) ( 20 mg ), po, daily, # 90 cap(s), 3 Refill(s), Type: Maintenance  aspirin 81 mg oral tablet: 2 tab(s) ( 162 mg ), po, daily, # 100 tab(s), 0 Refill(s), Type: Maintenance, OTC (Rx)  ferrous gluconate 324 mg (37.5 mg elemental iron) oral tablet: 1 tab(s) ( 324 mg ), po, daily, # 90 tab(s), 3 Refill(s), Type: Maintenance  finasteride 5 mg oral tablet: 1 tab(s) ( 5 mg ), PO, Daily, # 90 tab(s), 3 Refill(s), Type: Maintenance  furosemide 40 mg oral tablet: 1 tab(s) ( 40 mg ), PO, bid, # 60 tab(s), 1 Refill(s), Type: Maintenance  losartan 25 mg oral tablet: 1 tab(s) ( 25 mg ), PO, Daily, # 90 tab(s), 3 Refill(s), Type: Maintenance  metoprolol succinate 50 mg oral tablet, extended release: 1 tab(s) ( 50 mg ), PO, Daily, # 90 tab(s), 3  Refill(s), Type: Maintenance  Documented Medications  Documented  acetaminophen 325 mg oral capsule: 1 cap(s) ( 325 mg ), po, tid, 0 Refill(s), Type: Maintenance  amLODIPine 5 mg oral tablet: 1 tab(s) ( 5 mg ), PO, Daily, # 30 tab(s), 0 Refill(s), Type: Maintenance  clotrimazole 1% topical cream: 1 annmarie, top, bid, 0 Refill(s), Type: Maintenance  Suspended  potassium chloride: ( 10 mEq ), daily, 0 Refill(s), Type: Maintenance   Problem list:    All Problems (Selected)  Embolic cerebral infarction / SNOMED CT 0136574441 / Confirmed  HTN (hypertension) / SNOMED CT 1041938846 / Confirmed  Bilateral inguinal hernia / SNOMED CT 687661983 / Confirmed  Hematoma / SNOMED CT 8920011756 / Confirmed  Abnormal glucose / SNOMED CT 048982799 / Confirmed  Latent tuberculosis / SNOMED CT 65760473 / Confirmed  Anemia, blood loss / SNOMED CT 6447158862 / Confirmed  Hernia, ventral / SNOMED CT 5987126633 / Confirmed  Obesity / SNOMED CT 2038159906 / Probable  History of deep venous thrombosis / SNOMED CT 5125507505 / Confirmed  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 / SNOMED CT 7499025617 / Confirmed  S/P mitral valve clip implantation / SNOMED CT 5661643559 / Confirmed  S/P mitral valve clip implantation / SNOMED CT 1724662329 / Confirmed  Insomnia / SNOMED CT 114143451 / Confirmed  History of MI (myocardial infarction) / SNOMED CT 2626881020 / Confirmed  Nonrheumatic mitral valve regurgitation / SNOMED CT 680083035 / Confirmed  Esophageal reflux / SNOMED CT 368440786 / Confirmed  Lumbar spondylosis / SNOMED CT 183934801 / Confirmed  Epistaxis / SNOMED CT 979363719 / Confirmed  Hyperplasia of prostate / SNOMED CT 270934511 / Confirmed  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 00738098 / Confirmed  Urinary retention / SNOMED CT 456998480 / Confirmed  Acute posthemorrhagic anemia / SNOMED CT 321021871 / Confirmed  Left hemiparesis / SNOMED CT 241692007 / Confirmed  Pain of right thumb / SNOMED CT 423156905 /  "Confirmed  Barretts esophagus / SNOMED CT 560838780 / Confirmed  Chronic alcohol use / SNOMED CT 512593340 / Confirmed  Hematuria / SNOMED CT 791306914 / Confirmed  Foot pain / SNOMED CT 940381227 / Confirmed  CHF (congestive heart failure) / SNOMED CT 19415360 / Confirmed  CAD (coronary artery disease) / SNOMED CT 06949320 / Confirmed  Hyperlipidemia / SNOMED CT 49246721 / Confirmed  Tuberculosis / SNOMED CT 91496029 / Confirmed      Histories   Past Medical History:    Active  Hematoma (2464232048): Onset on 12/15/2017 at 82 years.  Embolic cerebral infarction (1117284142): Onset on 12/10/2017 at 82 years.  CHF (congestive heart failure) (54764553): Onset in the month of 11/2008 at 73 years  History of MI (myocardial infarction) (1877559801): Onset in the month of 11/2007 at 72 years  Barretts esophagus (761226806): Onset in the month of 1/2003 at 67 years  Traumatic blindness of left eye (32589829): Onset in 1999 at 64 years.  Comments:  8/18/2010 CDT 2:58 PM CDT - Olivia Coates CMA  \"ruptured blood vessel\"  HTN (hypertension) (9623987378): Onset in 1989 at 54 years.  Latent tuberculosis (77173426)  CAD (coronary artery disease) (85547865)  Acute posthemorrhagic anemia (471190054)  Hyperlipidemia (45937604)  Epistaxis (334680682)  Comments:  1/18/2018 CST 10:33 AM Thuy Topete  Chronic  Abnormal glucose (687711452)  Foot pain (563438296)  Nonrheumatic mitral valve regurgitation (758530790)  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 (8216606913)  Hematuria (264379403)  History of deep venous thrombosis (0078947881)  Comments:  1/18/2018 CST 9:57 AM Thuy Topete  Right lower extremity.  Iliac vein.  Anemia, blood loss (6431295310)  Tuberculosis (45063887)  Comments:  1/18/2018 CST 10:02 AM Thuy Topete  Patient tests positive.  See family history.  Bilateral inguinal hernia (565424142)  Hernia, ventral (3374973283)  Hyperplasia of prostate (048678286)  Comments:  1/18/2018 CST 10:24 " AM TRISTIN Hokpins  Thuy  Benign  Insomnia (490712416)  Pain of right thumb (037029478)  Comments:  2018 CST 10:36 AM Thuy Topete  Chronic  Chronic alcohol use (229768782)  Esophageal reflux (693127753)  Lumbar spondylosis (158151978)  Resolved  Inpatient stay (163680310): Onset on 2017 at 82 years.  Resolved on 2018 at 82 years.  Comments:  2018 CST 1:09 PM Blossom Art  @[facility], [facility], [place] - Embolic cerebral infarction    2018 CST 1:14 PM Blossom Art  [facility]  H/O herpes zoster (0863651001): Onset on 2009 at 73 years.  Resolved.  Age-related cataract of both eyes (485562447):  Resolved.  Acute kidney injury (84494210):  Resolved.  History of fracture of right ankle (7923637852):  Resolved.  Comments:  2018 CST 10:06 AM Thuy Topete  Right  Burst blood vessel in eye (575754604):  Resolved.  Comments:  2018 CST 10:05 AM Thuy Topete  Left  H/O fracture of wrist (6467567909):  Resolved.  Comments:  2018 CST 10:08 AM Thuy Topete  Right   Family History:    Cancer  Brother ()  Comments:  2010 3:29 PM - Olivia Coates CMA  Lung  Brother  Comments:  2010 3:29 PM - Olivia Coates CMA  Oral  Brother ()  Comments:  2010 3:29 PM - Olivia Coates CMA  colon  Brother ()  Leukemia  Sister (Adrianna, )  Lymphoma  Sister  Rheumatoid arthritis  Brother  TB - Tuberculosis  Father ()  Mother ()  HTN - Hypertension  Brother ()  Sister (Adrianna, )  Sister  Brother ()     Procedure history:    History of inferior vena cava filter placement (4341508039) on 12/10/2017 at 82 Years.  Mitral valve clip (0445833536) on 2017 at 82 Years.  Comments:  2018 1:16 PM - Blossom Varela  Complicated by air embolization and intermedius vein graft  PCI - Percutaneous coronary intervention (9207325037) on 2017 at 82  Years.  Comments:  1/31/2018 1:18 PM - Blossom Varela  via left femoral artery with coronary angiogram.  Intraaortic balloon pump placed and later removed.  Coronary angiogram (77306728) on 9/1/2017 at 81 Years.  Extracapsular cataract extraction and insertion of intraocular lens (227602376) on 7/6/2017 at 81 Years.  Comments:  7/25/2017 12:37 PM - Dalila Rondon  Right.  Extracapsular cataract extraction and insertion of intraocular lens (007306528) on 6/29/2017 at 81 Years.  Comments:  8/30/2017 1:44 PM - Dalila Rondon  Left.  Coronary angiogram (07485607) on 3/6/2017 at 81 Years.  Repair of recurrent ventral hernia w/mesh (951161880) on 8/21/2012 at 76 Years.  Repair of epigastric hernia (980017046) on 3/15/2012 at 76 Years.  right shoulder rotator cuff repair in the month of 8/2010 at 74 Years.  Comments:  8/18/2010 3:06 PM - RENARD GORMAN  Colonoscopy (134168696) in the month of 3/2009 at 73 Years.  Comments:  8/18/2010 3:02 PM - RENARD GORMAN  Normal  CABG x 2 - Coronary artery bypass grafts x 2 (227914548) in the month of 11/2007 at 72 Years.  laser for photocoagulation in the month of 3/2007 at 71 Years.  EGD in the month of 1/2003 at 67 Years.  Flexible sigmoidoscopy (63891101) on 10/12/2000 at 65 Years.  Flexible sigmoidoscopy (89575225) on 10/19/1993 at 58 Years.  right shoulder decompression in 1989 at 54 Years.  Wrist Fracture in 1987 at 52 Years.  Comments:  8/18/2010 3:07 PM - RENARD GORMAN  Compression fracutre of foot in 1978 at 43 Years.  L ankle compression in 1975 at 40 Years.  Bilateral vasectomy (858269429) in 1975 at 40 Years.  Back (873991671) in 1975 at 40 Years.  Comments:  1/18/2018 10:26 AM - Thuy Hopkins  Lumbar and thoracic.    1/18/2018 10:23 AM - Thuy Hopkins  History of spine surgery.  Rotator cuff repair (387566296) in 1964 at 29 Years.  Comments:  2/24/2012 10:25 AM - Jimi MG, Berhane DUGAN.  left shoulder  Hernia repair (02561305).  Comments:  8/18/2010 3:05 PM - SHERIF  RENARD  Bilateral, Laparoscopy  vitreoretinal surgery.  ORIF - Open reduction of fracture of ankle with internal fixation (521436017435234).  Comments:  1/18/2018 10:11 AM - Thuy Hopkins   Social History:        Alcohol Assessment: Current            Current                     Comments:                      08/18/2010 - Renard Coates CMA                     rare      Tobacco Assessment: Denies Tobacco Use      Substance Abuse Assessment: Denies Substance Abuse      Home and Environment Assessment            Marital status: .        Physical Examination   Vital Signs   5/18/2018 8:59 AM CDT Temperature Tympanic 97.3 DegF  LOW    Peripheral Pulse Rate 64 bpm    Pulse Site Radial artery    Respiratory Rate 20 br/min    Systolic Blood Pressure 168 mmHg  HI    Diastolic Blood Pressure 96 mmHg  HI    Mean Arterial Pressure 120 mmHg    BP Site Right arm    BP Systolic Repeat 150 mmHg    BP Diastolic Repeat 98 mmHg    BP Site Repeat Right arm    Oxygen Saturation 99 %      Measurements from flowsheet : Measurements   5/18/2018 8:59 AM CDT Height Measured - Standard 68 in    Weight Measured - Standard 187 lb    BSA 2.02 m2    Body Mass Index 28.43 kg/m2  HI      General:  No acute distress.    Neck:  wound is healing well,  old tegaderm is removed and a new one is placed.    Respiratory:  Lungs are clear to auscultation.    Cardiovascular:  Normal rate, Regular rhythm, grade 3/6 systolic murmur.    Psychiatric:  Appropriate mood & affect.       Impression and Plan   Diagnosis     HTN (hypertension) (DEK14-XG I10).     S/P mitral valve clip implantation (IGE87-PW Z98.890).     CHF (congestive heart failure) (KHE07-JT I50.9).     Plan:  remove tegaderm in 2-3 days, continue on current meds, recheck in 2 months.    Orders     Orders   Charges (Evaluation and Management):  10143 office outpatient visit 15 minutes (Charge) (Order): Quantity: 1, HTN (hypertension)  S/P mitral valve clip implantation  CHF (congestive  heart failure).

## 2022-02-15 NOTE — CARE COORDINATION
Pt appears on [facility] chronic disease panel as out of parameters for Statin.  Pt has appt on 8/28/2018. Per Cardio mote on 8/9/2018 pt is taking Atorvastatin 40 mg at hs.

## 2022-02-15 NOTE — TELEPHONE ENCOUNTER
---------------------  From: Kelsy Glez , Cat   To: Consuelo Newell; Appointment Pool ([id number] - [place]);     Sent: 3/16/2020 11:20:55 AM CDT  Subject: Please change 3/17 appt to telemedicine     Please change 3/17/2020 MT appt to telemedicine.   Pt is aware.  KB---------------------  From: Rukhsana Brewer (Appointment Pool ([id number]))   To: Kelsy Glez Kaity;     Sent: 3/16/2020 11:43:54 AM CDT  Subject: RE: Please change 3/17 appt to telemedicine     This appointment has been changed.---------------------  From: Kelsy Glez Kaity   To: Rukhsana Brewer;     Sent: 3/16/2020 1:20:24 PM CDT  Subject: FW: Please change 3/17 appt to telemedicine     Rukhsana, can you change this to a 60 minute appt?

## 2022-02-15 NOTE — PROGRESS NOTES
Patient:   PUMA BAEZ            MRN: [id number]            FIN: [id number]               Age:   81 years     Sex:  Male     :  1935   Associated Diagnoses:   CHF (Congestive Heart Failure); Coronary Artery Disease; Hx of epistaxis; Mitral valve insufficiency   Author:   Berhane Krause PA-C      Chief Complaint   2017 2:18 PM CDT     f/u angiogram from last week.   discuss options, unable to have stent placed.        History of Present Illness   Chief complaint and symptoms noted above and confirmed with patient   as above  saw  his cardiologists at the [facility] on , had an angiogram on  which showed significant blockage in LAD, circumflex and RCA  this is in addition to severe mitral valve insufficiency      he was told that if that they can clip the mitral valve but at the same time they would want to stent the circumflex and RCA  but he would need to be on antiplatelet therapy  However, every time he is on anti-platelet therapy he gets bad nose bleeds (even on a daily aspirin)  His cardiologists suggested seeing an ENT who could prevent the epistaxis.  They suggested Dr Kyle Alberts in [place]  but he cannot see the patient until November    Patient wonders about seeing an ENT here         Review of Systems   Constitutional:  Fatigue.    Respiratory:  Shortness of breath.    Cardiovascular:  No chest pain.       Health Status   Allergies:    Allergic Reactions (Selected)  Severity Not Documented  Narcotics (Severe constipation)  Vicodin (Urinary retention)   Medications:  (Selected)   Prescriptions  Prescribed  aspirin 81 mg oral tablet: See Instructions, Instructions: 1 tab(s) po every third day, # 100 tab(s), 0 Refill(s), Type: Maintenance, OTC (Rx)  Documented Medications  Documented  Cozaar 100 mg oral tablet: 1 tab(s) ( 100 mg ), po, daily, 0 Refill(s)  Fish Oil: ( 1,000 mg ), po, cap(s), 0 Refill(s), Type: Maintenance  Metoprolol Succinate  mg oral tablet,  "extended release: 1 tab(s) ( 100 mg ), PO, Daily, 0 Refill(s)  Saw Palmetto oral capsule: 0 Refill(s), Type: Maintenance  amlodipine 10 mg oral tablet: 1 tab(s), PO, Daily, # 30 tab(s), 0 Refill(s)  atorvastatin 40 mg oral tablet: 1 tab(s) ( 40 mg ), PO, Daily, # 30 tab(s), 0 Refill(s), Type: Maintenance  furosemide 20 mg oral tablet: 1 tab(s) ( 20 mg ), po, bid, 0 Refill(s)  Suspended  potassium chloride: ( 10 mEq ), daily, 0 Refill(s), Type: Maintenance   Problem list:    All Problems (Selected)  Latent tuberculosis / SNOMED CT 15505997 / Confirmed  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 07651148 / Confirmed  HTN (Hypertension) / ICD-9-.9 / Confirmed  MI (Myocardial Infarction) / ICD-9-.90 / Confirmed  Coronary Artery Disease / ICD-9-.00 / Confirmed  CHF (Congestive Heart Failure) / ICD-9-.0 / Confirmed  Barretts Esophagus / ICD-9-.85 / Confirmed      Histories   Past Medical History:    Active  CHF (Congestive Heart Failure) (428.0): Onset in the month of 2008 at 73 years  MI (Myocardial Infarction) (410.90): Onset in the month of 2007 at 72 years  Barretts Esophagus (530.85): Onset in the month of 2003 at 67 years  Traumatic blindness of left eye (61883621): Onset in  at 64 years.  Comments:  2010 CDT 2:58 PM CDT - Olivia Coates CMA  \"ruptured blood vessel\"  HTN (Hypertension) (401.9): Onset in  at 53 years.  Latent tuberculosis (33179738)  Coronary Artery Disease (414.00)  Resolved  Herpes Zoster (053.9): Onset on 2009 at 73 years.  Resolved.   Family History:    Cancer  Brother ()  Comments:  2010 3:29 PM - Olivia Coates CMA  Lung  Brother  Comments:  2010 3:29 PM - Olivia Coates CMA  Oral  Brother ()  Comments:  2010 3:29 PM - Jaxon CMA, Olivia  colon  Brother ()  Leukemia  Sister (Adrianna, )  Lymphoma  Sister  Rheumatoid arthritis  Brother  TB - Tuberculosis  Father ()  Mother ()  HTN - " Hypertension  Brother ()  Sister (Adrianna, )  Sister  Brother ()     Procedure history:    Extracapsular cataract extraction and insertion of intraocular lens (354235703) on 2017 at 81 Years.  Comments:  2017 12:37 PM - Dalila Rondon  Right.  Extracapsular cataract extraction and insertion of intraocular lens (776379997) on 2017 at 81 Years.  Comments:  2017 1:44 PM - NelaDalila roman  Left.  Coronary angiogram (78281191) on 3/6/2017 at 81 Years.  Repair of recurrent ventral hernia w/mesh (387759737) on 2012 at 76 Years.  Repair of epigastric hernia (780303062) on 3/15/2012 at 76 Years.  right shoulder rotator cuff repair in the month of 2010 at 74 Years.  Comments:  2010 3:06 PM - RENARD GORMAN  Colonoscopy (381580688) in the month of 3/2009 at 73 Years.  Comments:  2010 3:02 PM - RENARD GORMAN  Normal  CABG x 2 - Coronary artery bypass grafts x 2 (612038167) in the month of 2007 at 72 Years.  laser for photocoagulation in the month of 3/2007 at 71 Years.  EGD in the month of 2003 at 67 Years.  Flexible sigmoidoscopy (42975861) on 10/12/2000 at 65 Years.  Flexible sigmoidoscopy (03883052) on 10/19/1993 at 58 Years.  right shoulder decompression in  at 54 Years.  Wrist Fracture in  at 52 Years.  Comments:  2010 3:07 PM - RENARD GORMAN  Compression fracutre of foot in  at 43 Years.  L ankle compression in  at 40 Years.  Bilateral vasectomy (015571372) in  at 40 Years.  Rotator cuff repair (464842640) in  at 29 Years.  Comments:  2012 10:25 AM - Berhane Krause PA-C.  left shoulder  Hernia repair (17049865).  Comments:  2010 3:05 PM - RENARD GORMAN  Bilateral, Laparoscopy  vitreoretinal surgery.   Social History:        Alcohol Assessment: Current            Current                     Comments:                      2010 - Renard Coates CMA                     rare      Tobacco Assessment: Denies Tobacco  Use      Substance Abuse Assessment: Denies Substance Abuse      Home and Environment Assessment            Marital status: .        Physical Examination   Vital Signs   9/5/2017 2:18 PM CDT Temperature Tympanic 97.5 DegF  LOW    Peripheral Pulse Rate 68 bpm    Pulse Site Radial artery    HR Method Manual    Systolic Blood Pressure 150 mmHg  HI    Diastolic Blood Pressure 86 mmHg    Mean Arterial Pressure 107 mmHg    BP Site Right arm    BP Method Manual      Measurements from flowsheet : Measurements   9/5/2017 2:18 PM CDT Height Measured - Standard 68 in    Weight Measured - Standard 192.4 lb    BSA 2.04 m2    Body Mass Index 29.25 kg/m2      General:  No acute distress.    Respiratory:  Lungs are clear to auscultation.    Cardiovascular:  Normal rate, Regular rhythm, No edema, previously described grade 3/6 holosystolic murmur.       Review / Management   Documentation reviewed:  cardiac catherization report.       Impression and Plan   Diagnosis     CHF (Congestive Heart Failure) (KKQ69-LQ I50.9).     Coronary Artery Disease (JOJ73-DJ I25.10).     Hx of epistaxis (VBW04-RG Z87.898).     Mitral valve insufficiency (NTC42-CV I34.0).     Summary:  will refer to Dr Latham, ENT for evaluation of his epistaxis on anti-platelet therapy for possible corrective action so that he can proceed with the mitral valve CLIP and stents.    Orders     Orders   Requests (Consults / Referrals):  Referral (Request) (Order): 9/5/2017 2:44 PM CDT, Referred to: Otolaryngology (ENT), Referred to: Dr Latham, Reason for referral: hx of epistaxis on anti-platelet therapy, needs corrective action for the epistaxis so that he can have the mitral valve clipping and stents, Hx of epistaxis  CHF (Congestive Heart Failure)  Coronary Artery Disease  Mitral valve insufficiency.     Orders   Charges (Evaluation and Management):  24566 office outpatient visit 15 minutes (Charge) (Order): Quantity: 1, Hx of epistaxis  CHF (Congestive Heart  Failure)  Coronary Artery Disease  Mitral valve insufficiency.

## 2022-02-15 NOTE — PROGRESS NOTES
Patient:   PUMA BAEZ            MRN: [id number]            FIN: [id number]               Age:   83 years     Sex:  Male     :  1935   Associated Diagnoses:   Fracture of left superior pubic ramus   Author:   Jimi MG, Berhane CRISTOBAL      Visit Information   Accompanied by:  daughter.       Chief Complaint   3/19/2019 9:57 AM CDT    Pt here for ER FU for left pubic rami Fx from 3/11/19.      History of Present Illness   Chief complaint and symptoms noted above and confirmed with patient   he was seen in the ER on 3/11 for a closed fracture of Left superior pubic ramus  he has been using his recumbent bike but is having profound weakness in his left leg  he has fallen a few times  is down to taking 1/2 tab percocet twice a day           Health Status   Allergies:    Allergic Reactions (Selected)  Severity Not Documented  Narcotics (Severe constipation)  Vicodin (Urinary retention)   Medications:  (Selected)   Prescriptions  Prescribed  FLUoxetine 20 mg oral capsule: = 2 cap(s) ( 40 mg ), po, daily, # 90 cap(s), 3 Refill(s), Type: Maintenance, Pharmacy: [id number]  aspirin 81 mg oral tablet: 2 tab(s) ( 162 mg ), po, daily, # 100 tab(s), 0 Refill(s), Type: Maintenance, OTC (Rx)  ferrous gluconate 324 mg (37.5 mg elemental iron) oral tablet: 1 tab(s) ( 324 mg ), po, daily, # 90 tab(s), 3 Refill(s), Type: Maintenance  finasteride 5 mg oral tablet: = 1 tab(s) ( 5 mg ), PO, Daily, # 90 tab(s), 3 Refill(s), Type: Maintenance, Pharmacy: [facility] #[id number], 1 tab(s) Oral daily  furosemide 40 mg oral tablet: See Instructions, Instructions: 1.5 tabs(60 mg) PO daily., # 135 tab(s), 1 Refill(s), Type: Maintenance  losartan 25 mg oral tablet: 1 tab(s) ( 25 mg ), PO, Daily, # 90 tab(s), 3 Refill(s), Type: Maintenance  metoprolol succinate 50 mg oral tablet, extended release: = 1 tab(s) ( 50 mg ), PO, Daily, # 90 tab(s), 3 Refill(s), Type: Maintenance, Pharmacy: [facility] #[id number], 1 tab(s) Oral  daily  tamsulosin 0.4 mg oral capsule: = 1 cap(s) ( 0.4 mg ), po, daily, # 90 cap(s), 3 Refill(s), Type: Maintenance, Pharmacy: [facility] #[id number], 1 cap(s) Oral daily  Documented Medications  Documented  acetaminophen 325 mg oral capsule: 1 cap(s) ( 325 mg ), po, tid, 0 Refill(s), Type: Maintenance  acetaminophen-oxycodone 325 mg-5 mg oral tablet: 1 tab(s), Oral, q6 hrs, # 15, 0 Refill(s), Type: Maintenance, Given in ER  amLODIPine 5 mg oral tablet: 1 tab(s) ( 5 mg ), PO, Daily, # 30 tab(s), 0 Refill(s), Type: Maintenance  atorvastatin 40 mg oral tablet: 1 tab(s) ( 40 mg ), Oral, daily, Instructions: Per Cardio note 8/8/2018, 0 Refill(s), Type: Maintenance  Suspended  potassium chloride: ( 10 mEq ), daily, 0 Refill(s), Type: Maintenance,    Medications          *denotes recorded medication          FLUoxetine 20 mg oral capsule: 40 mg, 2 cap(s), po, daily, 90 cap(s), 3 Refill(s).          *acetaminophen 325 mg oral capsule: 325 mg, 1 cap(s), po, tid, 0 Refill(s).          *acetaminophen-oxycodone 325 mg-5 mg oral tablet: 1 tab(s), Oral, q6 hrs, 15, 0 Refill(s).          *amLODIPine 5 mg oral tablet: 5 mg, 1 tab(s), PO, Daily, 30 tab(s), 0 Refill(s).          aspirin 81 mg oral tablet: 162 mg, 2 tab(s), po, daily, 100 tab(s), 0 Refill(s).          *atorvastatin 40 mg oral tablet: 40 mg, 1 tab(s), Oral, daily, Per Cardio note 8/8/2018, 0 Refill(s).          ferrous gluconate 324 mg (37.5 mg elemental iron) oral tablet: 324 mg, 1 tab(s), po, daily, 90 tab(s), 3 Refill(s).          finasteride 5 mg oral tablet: 5 mg, 1 tab(s), PO, Daily, 90 tab(s), 3 Refill(s).          furosemide 40 mg oral tablet: See Instructions, 1.5 tabs(60 mg) PO daily., 135 tab(s), 1 Refill(s).          losartan 25 mg oral tablet: 25 mg, 1 tab(s), PO, Daily, 90 tab(s), 3 Refill(s).          metoprolol succinate 50 mg oral tablet, extended release: 50 mg, 1 tab(s), PO, Daily, 90 tab(s), 3 Refill(s).          tamsulosin 0.4 mg oral capsule: 0.4  mg, 1 cap(s), po, daily, 90 cap(s), 3 Refill(s).     Problem list:    All Problems (Selected)  Embolic cerebral infarction / SNOMED CT 3610538875 / Confirmed  Bilateral inguinal hernia / SNOMED CT 775812747 / Confirmed  Hematoma / SNOMED CT 1104606573 / Confirmed  Abnormal glucose / SNOMED CT 616439264 / Confirmed  Latent tuberculosis / SNOMED CT 60811458 / Confirmed  Anemia, blood loss / SNOMED CT 3210438427 / Confirmed  Hernia, ventral / SNOMED CT 8653302879 / Confirmed  Obesity / SNOMED CT 3302087189 / Probable  History of deep venous thrombosis / SNOMED CT 9921768064 / Confirmed  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 / SNOMED CT 0624777041 / Confirmed  S/P mitral valve clip implantation / SNOMED CT 4804654643 / Confirmed  S/P mitral valve clip implantation / SNOMED CT 6360090578 / Confirmed  Insomnia / SNOMED CT 299019967 / Confirmed  History of MI (myocardial infarction) / SNOMED CT 5083280305 / Confirmed  Nonrheumatic mitral valve regurgitation / SNOMED CT 891999096 / Confirmed  Esophageal reflux / SNOMED CT 035673572 / Confirmed  Lumbar spondylosis / SNOMED CT 082622695 / Confirmed  Epistaxis / SNOMED CT 839944489 / Confirmed  Hyperplasia of prostate / SNOMED CT 842691247 / Confirmed  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 98871954 / Confirmed  Urinary retention / SNOMED CT 375442600 / Confirmed  Acute posthemorrhagic anemia / SNOMED CT 243214622 / Confirmed  Left hemiparesis / SNOMED CT 347741566 / Confirmed  Pain of right thumb / SNOMED CT 242818940 / Confirmed  Barretts esophagus / SNOMED CT 510105130 / Confirmed  Chronic alcohol use / SNOMED CT 874707139 / Confirmed  Hematuria / SNOMED CT 874264361 / Confirmed  Foot pain / SNOMED CT 838376817 / Confirmed  CHF (congestive heart failure) / SNOMED CT 09467988 / Confirmed  Hypertensive heart disease with heart failure / SNOMED CT 30079111 / Confirmed  CAD (coronary artery disease) / SNOMED CT 31230865 / Confirmed  Hyperlipidemia /  "SNOMED CT 30877853 / Confirmed  Tuberculosis / SNOMED CT 91712792 / Confirmed      Histories   Past Medical History:    Active  Hematoma (5064625144): Onset on 12/15/2017 at 82 years.  Embolic cerebral infarction (6873522176): Onset on 12/10/2017 at 82 years.  CHF (congestive heart failure) (05579857): Onset in the month of 11/2008 at 73 years  History of MI (myocardial infarction) (5157436732): Onset in the month of 11/2007 at 72 years  Barretts esophagus (384256820): Onset in the month of 1/2003 at 67 years  Traumatic blindness of left eye (57320815): Onset in 1999 at 64 years.  Comments:  8/18/2010 CDT 2:58 PM CDT - Olivia Coates CMA  \"ruptured blood vessel\"  Hypertensive heart disease with heart failure (92700499): Onset in 1989 at 54 years.  Latent tuberculosis (59631469)  CAD (coronary artery disease) (95291188)  Acute posthemorrhagic anemia (150706107)  Hyperlipidemia (48629651)  Epistaxis (517722056)  Comments:  1/18/2018 CST 10:33 AM Thuy Topete  Chronic  Abnormal glucose (343589312)  Foot pain (170162663)  Nonrheumatic mitral valve regurgitation (830994677)  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 (1603066251)  Hematuria (924544382)  History of deep venous thrombosis (7096126044)  Comments:  1/18/2018 CST 9:57 AM Thuy Topete  Right lower extremity.  Iliac vein.  Anemia, blood loss (5835740028)  Tuberculosis (80283531)  Comments:  1/18/2018 CST 10:02 AM Thuy Topete  Patient tests positive.  See family history.  Bilateral inguinal hernia (526316827)  Hernia, ventral (0258705586)  Hyperplasia of prostate (617429463)  Comments:  1/18/2018 CST 10:24 AM Thuy Topete  Benign  Insomnia (966292883)  Pain of right thumb (460253919)  Comments:  1/18/2018 CST 10:36 AM Thuy Topete  Chronic  Chronic alcohol use (621418440)  Esophageal reflux (598442914)  Lumbar spondylosis (575395789)  Resolved  Inpatient stay (629162687): Onset on 12/21/2017 at 82 years.  Resolved on " 2018 at 82 years.  Comments:  2018 CST 1:09 PM Blossom Art  @[facility], [place], [place] - Embolic cerebral infarction    2018 CST 1:14 PM Blossom Art  [facility] not ANW  H/O herpes zoster (8363099264): Onset on 2009 at 73 years.  Resolved.  Age-related cataract of both eyes (572472208):  Resolved.  Acute kidney injury (42953411):  Resolved.  History of fracture of right ankle (0072830770):  Resolved.  Comments:  2018 CST 10:06 AM Thuy Topete  Right  Burst blood vessel in eye (236516171):  Resolved.  Comments:  2018 CST 10:05 AM Thuy Topete  Left  H/O fracture of wrist (6999438765):  Resolved.  Comments:  2018 CST 10:08 AM Thuy Topete  Right   Family History:    Cancer  Brother ()  Comments:  2010 3:29 PM CDT - Jaxon OLEARYOlivia  Lung  Brother  Comments:  2010 3:29 PM CDT - Jaxon OLEARYOlivia  Oral  Brother ()  Comments:  2010 3:29 PM CDMANOJ - Jaxon OLEARY Olivia  colon  Brother ()  Leukemia  Sister (Adrianna, )  Lymphoma  Sister  Rheumatoid arthritis  Brother  TB - Tuberculosis  Father ()  Mother ()  HTN - Hypertension  Brother ()  Sister (Adrianna, )  Sister  Brother ()     Procedure history:    History of inferior vena cava filter placement (2347398973) on 12/10/2017 at 82 Years.  Mitral valve clip (6778332232) on 2017 at 82 Years.  Comments:  2018 1:16 PM Blossom Art  Complicated by air embolization and intermedius vein graft  PCI - Percutaneous coronary intervention (7960133561) on 2017 at 82 Years.  Comments:  2018 1:18 PM Blossom Art  via left femoral artery with coronary angiogram.  Intraaortic balloon pump placed and later removed.  Coronary angiogram (65003706) on 2017 at 81 Years.  Extracapsular cataract extraction and insertion of intraocular lens (736394425) on 2017 at 81  Years.  Comments:  7/25/2017 12:37 PM IDALIA Rondon Dalila  Right.  Extracapsular cataract extraction and insertion of intraocular lens (063048414) on 6/29/2017 at 81 Years.  Comments:  8/30/2017 1:44 PM IDALIA Rondon Dalila  Left.  Coronary angiogram (36266046) on 3/6/2017 at 81 Years.  Repair of recurrent ventral hernia w/mesh (968082752) on 8/21/2012 at 76 Years.  Repair of epigastric hernia (427134573) on 3/15/2012 at 76 Years.  right shoulder rotator cuff repair in the month of 8/2010 at 74 Years.  Comments:  8/18/2010 3:06 PM RENARD BEVERLY  Colonoscopy (930749208) in the month of 3/2009 at 73 Years.  Comments:  8/18/2010 3:02 PM RENARD BEVERLY  Normal  CABG x 2 - Coronary artery bypass grafts x 2 (140642618) in the month of 11/2007 at 72 Years.  laser for photocoagulation in the month of 3/2007 at 71 Years.  EGD in the month of 1/2003 at 67 Years.  Flexible sigmoidoscopy (45569600) on 10/12/2000 at 65 Years.  Flexible sigmoidoscopy (92079881) on 10/19/1993 at 58 Years.  right shoulder decompression in 1989 at 54 Years.  Wrist Fracture in 1987 at 52 Years.  Comments:  8/18/2010 3:07 PM RENARD BEVERLY  Compression fracutre of foot in 1978 at 43 Years.  L ankle compression in 1975 at 40 Years.  Bilateral vasectomy (678417664) in 1975 at 40 Years.  Back (142781234) in 1975 at 40 Years.  Comments:  1/18/2018 10:26 AM Thuy Topete  Lumbar and thoracic.    1/18/2018 10:23 AM Thuy Topete  History of spine surgery.  Rotator cuff repair (296184857) in 1964 at 29 Years.  Comments:  2/24/2012 10:25 AM Berhane Waggoner PA-C.  left shoulder  Hernia repair (25109850).  Comments:  8/18/2010 3:05 PM RENARD BEVERLY  Bilateral, Laparoscopy  vitreoretinal surgery.  ORIF - Open reduction of fracture of ankle with internal fixation (712906807619054).  Comments:  1/18/2018 10:11 AM TRISTIN - Thuy Hopkins   Social History:        Alcohol Assessment: Current            Current                      Comments:                      08/18/2010 - Jaxon OLEARY, Olivia                     rare      Tobacco Assessment: Denies Tobacco Use      Substance Abuse Assessment: Denies Substance Abuse      Home and Environment Assessment            Marital status: .      Physical Examination   Vital Signs   3/19/2019 9:57 AM CDT Temperature Tympanic 97.3 DegF  LOW    Peripheral Pulse Rate 72 bpm    Pulse Site Radial artery    Respiratory Rate 22 br/min  HI    Systolic Blood Pressure 152 mmHg  HI    Diastolic Blood Pressure 70 mmHg    Mean Arterial Pressure 97 mmHg    BP Site Right arm    Oxygen Saturation 98 %    Vital Signs Comments unable to get weight today. Pulse Irregular      Measurements from flowsheet : Measurements   3/19/2019 9:57 AM CDT    Height Measured - Standard                68 in     General:  No acute distress.    Respiratory:  Lungs are clear to auscultation.    Cardiovascular:  Normal rate, Regular rhythm, 4/6 holosystolic murmur.    Musculoskeletal:  left leg:  good strength with foot plantar flexion, weak resisted dorsi flexion  good strength with resisted lower leg flexion, weak extension  no pain with palpation over left hip, good passive flexion and extension of hip without pain.    Psychiatric:  Appropriate mood & affect.       Review / Management   Documentation reviewed:  ED notes.       Impression and Plan   Diagnosis     Fracture of left superior pubic ramus (IMJ17-DH S32.512A).     Course:  Progressing as expected.    Summary:  will renew his percocet, will have him see PT, continue with the recumbent bike, follow up if not improving.    Orders     Orders   Pharmacy:  acetaminophen-oxycodone 325 mg-5 mg oral tablet (Prescribe): 1 tab(s), Oral, q8 hrs, PRN: as needed for pain, # 40 tab(s), 0 Refill(s), Type: Maintenance, Pharmacy: [facility] [id number], 1 tab(s) Oral q8 hrs,PRN:as needed for pain  acetaminophen-oxycodone 325 mg-5 mg oral tablet (Discontinue).       Orders    Requests (Procedures):  Physical Therapy (Request) (Order): Fracture of left superior pubic ramus.       Orders   Charges (Evaluation and Management):  70524 office outpatient visit 15 minutes (Charge) (Order): Quantity: 1, Fracture of left superior pubic ramus.

## 2022-02-15 NOTE — PROGRESS NOTES
Patient:   PUMA BAEZ            MRN: [id number]            FIN: [id number]               Age:   83 years     Sex:  Male     :  1935   Associated Diagnoses:   CHF (congestive heart failure); Hyperlipidemia; Hyperplasia of prostate; Hypertensive heart disease with heart failure; Urinary retention   Author:   Berhane Krause PA-C      Chief Complaint   1/15/2019 10:10 AM CST   Pt here for needing 90 day prescription refills on his finasteride and fluoxetine sent to [facility].      History of Present Illness   Chief complaint and symptoms noted above and confirmed with patient   as above, his cardiologist (Dr Lawrence) renewed his meds and sent to [facility]  now he would like to have his other Rxs sent to [facility]    he has been feeling pretty good, is going outside daily to split wood      Health Status   Allergies:    Allergic Reactions (Selected)  Severity Not Documented  Narcotics (Severe constipation)  Vicodin (Urinary retention)   Medications:  (Selected)   Prescriptions  Prescribed  FLUoxetine 20 mg oral capsule: 1 cap(s) ( 20 mg ), po, daily, # 90 cap(s), 3 Refill(s), Type: Maintenance  aspirin 81 mg oral tablet: 2 tab(s) ( 162 mg ), po, daily, # 100 tab(s), 0 Refill(s), Type: Maintenance, OTC (Rx)  ferrous gluconate 324 mg (37.5 mg elemental iron) oral tablet: 1 tab(s) ( 324 mg ), po, daily, # 90 tab(s), 3 Refill(s), Type: Maintenance  finasteride 5 mg oral tablet: 1 tab(s) ( 5 mg ), PO, Daily, # 90 tab(s), 3 Refill(s), Type: Maintenance  furosemide 40 mg oral tablet: 1 tab(s) ( 40 mg ), PO, bid, # 60 tab(s), 1 Refill(s), Type: Maintenance  losartan 25 mg oral tablet: 1 tab(s) ( 25 mg ), PO, Daily, # 90 tab(s), 3 Refill(s), Type: Maintenance  metoprolol succinate 50 mg oral tablet, extended release: 1 tab(s) ( 50 mg ), PO, Daily, # 90 tab(s), 3 Refill(s), Type: Maintenance  Documented Medications  Documented  acetaminophen 325 mg oral capsule: 1 cap(s) ( 325 mg ), po, tid, 0 Refill(s),  Type: Maintenance  amLODIPine 5 mg oral tablet: 1 tab(s) ( 5 mg ), PO, Daily, # 30 tab(s), 0 Refill(s), Type: Maintenance  atorvastatin 40 mg oral tablet: 1 tab(s) ( 40 mg ), Oral, daily, Instructions: Per Cardio note 8/8/2018, 0 Refill(s), Type: Maintenance  Suspended  potassium chloride: ( 10 mEq ), daily, 0 Refill(s), Type: Maintenance   Problem list:    All Problems (Selected)  Embolic cerebral infarction / SNOMED CT 5726906264 / Confirmed  Bilateral inguinal hernia / SNOMED CT 279931380 / Confirmed  Hematoma / SNOMED CT 9653659789 / Confirmed  Abnormal glucose / SNOMED CT 976013409 / Confirmed  Latent tuberculosis / SNOMED CT 21220044 / Confirmed  Anemia, blood loss / SNOMED CT 7669424131 / Confirmed  Hernia, ventral / SNOMED CT 6490901264 / Confirmed  Obesity / SNOMED CT 1210496304 / Probable  History of deep venous thrombosis / SNOMED CT 6881725659 / Confirmed  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 / SNOMED CT 2467343588 / Confirmed  S/P mitral valve clip implantation / SNOMED CT 1584858840 / Confirmed  S/P mitral valve clip implantation / SNOMED CT 8862198382 / Confirmed  Insomnia / SNOMED CT 663418529 / Confirmed  History of MI (myocardial infarction) / SNOMED CT 0447150987 / Confirmed  Nonrheumatic mitral valve regurgitation / SNOMED CT 457943679 / Confirmed  Esophageal reflux / SNOMED CT 395804954 / Confirmed  Lumbar spondylosis / SNOMED CT 151903380 / Confirmed  Epistaxis / SNOMED CT 602441323 / Confirmed  Hyperplasia of prostate / SNOMED CT 922004313 / Confirmed  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 09272253 / Confirmed  Urinary retention / SNOMED CT 363495590 / Confirmed  Acute posthemorrhagic anemia / SNOMED CT 184800047 / Confirmed  Left hemiparesis / SNOMED CT 325617189 / Confirmed  Pain of right thumb / SNOMED CT 483393714 / Confirmed  Barretts esophagus / SNOMED CT 773237812 / Confirmed  Chronic alcohol use / SNOMED CT 875219206 / Confirmed  Hematuria / SNOMED CT  "230199450 / Confirmed  Foot pain / SNOMED CT 704252420 / Confirmed  CHF (congestive heart failure) / SNOMED CT 53972344 / Confirmed  Hypertensive heart disease with heart failure / SNOMED CT 80597598 / Confirmed  CAD (coronary artery disease) / SNOMED CT 56717626 / Confirmed  Hyperlipidemia / SNOMED CT 58134123 / Confirmed  Tuberculosis / SNOMED CT 48490323 / Confirmed      Histories   Past Medical History:    Active  Hematoma (8158710271): Onset on 12/15/2017 at 82 years.  Embolic cerebral infarction (0372524784): Onset on 12/10/2017 at 82 years.  CHF (congestive heart failure) (53017759): Onset in the month of 11/2008 at 73 years  History of MI (myocardial infarction) (7207874526): Onset in the month of 11/2007 at 72 years  Barretts esophagus (030638027): Onset in the month of 1/2003 at 67 years  Traumatic blindness of left eye (37214326): Onset in 1999 at 64 years.  Comments:  8/18/2010 CDT 2:58 PM CDT - Olivia Coates CMA  \"ruptured blood vessel\"  Hypertensive heart disease with heart failure (27075476): Onset in 1989 at 54 years.  Latent tuberculosis (42209387)  CAD (coronary artery disease) (56964778)  Acute posthemorrhagic anemia (184909020)  Hyperlipidemia (96487466)  Epistaxis (667520205)  Comments:  1/18/2018 CST 10:33 AM Thuy Topete  Chronic  Abnormal glucose (300406128)  Foot pain (429949759)  Nonrheumatic mitral valve regurgitation (965395272)  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 (9201356159)  Hematuria (829935286)  History of deep venous thrombosis (193523)  Comments:  1/18/2018 CST 9:57 AM Thuy Topete  Right lower extremity.  Iliac vein.  Anemia, blood loss (7102004098)  Tuberculosis (59355392)  Comments:  1/18/2018 CST 10:02 AM Thuy Topete  Patient tests positive.  See family history.  Bilateral inguinal hernia (620856637)  Hernia, ventral (8537347543)  Hyperplasia of prostate (593162748)  Comments:  1/18/2018 CST 10:24 AM TRISTIN - Thuy Hopkins  Benign  Insomnia " (665540420)  Pain of right thumb (780579882)  Comments:  2018 CST 10:36 AM Thuy Topete  Chronic  Chronic alcohol use (605799424)  Esophageal reflux (745629932)  Lumbar spondylosis (627672573)  Resolved  Inpatient stay (821023637): Onset on 2017 at 82 years.  Resolved on 2018 at 82 years.  Comments:  2018 CST 1:09 PM Blossom Art  @[facility], [facility], [place] - Embolic cerebral infarction    2018 CST 1:14 PM Blossom Art  [facility]  H/O herpes zoster (4677309364): Onset on 2009 at 73 years.  Resolved.  Age-related cataract of both eyes (093267672):  Resolved.  Acute kidney injury (35984108):  Resolved.  History of fracture of right ankle (1470328296):  Resolved.  Comments:  2018 CST 10:06 AM Thuy Topete  Right  Burst blood vessel in eye (100700042):  Resolved.  Comments:  2018 CST 10:05 AM Thuy Topete  Left  H/O fracture of wrist (9197886171):  Resolved.  Comments:  2018 CST 10:08 AM Thuy Topete  Right   Family History:    Cancer  Brother ()  Comments:  2010 3:29 PM CDT - Jaxon OLEARY Olivia  Lung  Brother  Comments:  2010 3:29 PM CDT Olivia Shipman CMA  Oral  Brother ()  Comments:  2010 3:29 PM CDT - Olivia Coates CMA  colon  Brother ()  Leukemia  Sister (Adrianna, )  Lymphoma  Sister  Rheumatoid arthritis  Brother  TB - Tuberculosis  Father ()  Mother ()  HTN - Hypertension  Brother ()  Sister (Adrianna, )  Sister  Brother ()     Procedure history:    History of inferior vena cava filter placement (0671730352) on 12/10/2017 at 82 Years.  Mitral valve clip (9134011960) on 2017 at 82 Years.  Comments:  2018 1:16 PM Blossom Art  Complicated by air embolization and intermedius vein graft  PCI - Percutaneous coronary intervention (6650119567) on 2017 at 82 Years.  Comments:  2018 1:18  PM Blossom Art  via left femoral artery with coronary angiogram.  Intraaortic balloon pump placed and later removed.  Coronary angiogram (08475360) on 9/1/2017 at 81 Years.  Extracapsular cataract extraction and insertion of intraocular lens (938215057) on 7/6/2017 at 81 Years.  Comments:  7/25/2017 12:37 PM Dalila Estrada  Right.  Extracapsular cataract extraction and insertion of intraocular lens (546812841) on 6/29/2017 at 81 Years.  Comments:  8/30/2017 1:44 PM Dalila Estrada  Left.  Coronary angiogram (54952663) on 3/6/2017 at 81 Years.  Repair of recurrent ventral hernia w/mesh (821292581) on 8/21/2012 at 76 Years.  Repair of epigastric hernia (074978285) on 3/15/2012 at 76 Years.  right shoulder rotator cuff repair in the month of 8/2010 at 74 Years.  Comments:  8/18/2010 3:06 PM RENARD BEVERLY  Colonoscopy (459569804) in the month of 3/2009 at 73 Years.  Comments:  8/18/2010 3:02 PM RENARD BEVERLY  Normal  CABG x 2 - Coronary artery bypass grafts x 2 (226151057) in the month of 11/2007 at 72 Years.  laser for photocoagulation in the month of 3/2007 at 71 Years.  EGD in the month of 1/2003 at 67 Years.  Flexible sigmoidoscopy (15053631) on 10/12/2000 at 65 Years.  Flexible sigmoidoscopy (32469287) on 10/19/1993 at 58 Years.  right shoulder decompression in 1989 at 54 Years.  Wrist Fracture in 1987 at 52 Years.  Comments:  8/18/2010 3:07 PM RENARD BEVERLY  Compression fracutre of foot in 1978 at 43 Years.  L ankle compression in 1975 at 40 Years.  Bilateral vasectomy (500050352) in 1975 at 40 Years.  Back (933597391) in 1975 at 40 Years.  Comments:  1/18/2018 10:26 AM Thuy Topete  Lumbar and thoracic.    1/18/2018 10:23 AM Thuy Topete  History of spine surgery.  Rotator cuff repair (228556222) in 1964 at 29 Years.  Comments:  2/24/2012 10:25 AM TRISTIN - Jimi MG, Berhane DUGAN.  left shoulder  Hernia repair (83758756).  Comments:  8/18/2010 3:05 PM IDALIA GORMAN  RENARD  Bilateral, Laparoscopy  vitreoretinal surgery.  ORIF - Open reduction of fracture of ankle with internal fixation (809043157572650).  Comments:  1/18/2018 10:11 AM CST - Kellie Thuyeladia Palacio   Social History:        Alcohol Assessment: Current            Current                     Comments:                      08/18/2010 - Renard Coates CMA                     rare      Tobacco Assessment: Denies Tobacco Use      Substance Abuse Assessment: Denies Substance Abuse      Home and Environment Assessment            Marital status: .      Physical Examination   Vital Signs   1/15/2019 10:10 AM CST Temperature Tympanic 98.1 DegF    Peripheral Pulse Rate 72 bpm    Pulse Site Radial artery    Respiratory Rate 22 br/min  HI    Systolic Blood Pressure 148 mmHg  HI    Diastolic Blood Pressure 76 mmHg    Mean Arterial Pressure 100 mmHg    BP Site Right arm    BP Systolic Repeat 146 mmHg    BP Diastolic Repeat 76 mmHg    BP Site Repeat Right arm    Oxygen Saturation 98 %    Additional Vitals Info 146    Vital Signs Comments Pulse Irregular      Measurements from flowsheet : Measurements   1/15/2019 10:10 AM CST Height Measured - Standard 68 in    Weight Measured - Standard 198 lb    BSA 2.07 m2    Body Mass Index 30.1 kg/m2  HI      General:  No acute distress.    Respiratory:  Lungs are clear to auscultation.    Cardiovascular:  Normal rate, Regular rhythm, holosystolic grade 3/6 murmur.    Psychiatric:  Appropriate mood & affect.       Impression and Plan   Diagnosis     CHF (congestive heart failure) (PFR62-MZ I50.9).     Hyperlipidemia (NMZ54-UB E78.5).     Hyperplasia of prostate (HYH75-XF N40.0).     Hypertensive heart disease with heart failure (YDE28-CA I11.0).     Urinary retention (IJX03-DZ R33.9).     Summary:  will renew his finasteride, fluoxetine, metoprolol, tamsulosin for a year, sent to [facility].    Orders     Orders   Pharmacy:  tamsulosin 0.4 mg oral capsule (Prescribe): = 1 cap(s) ( 0.4 mg ), po,  daily, # 90 cap(s), 3 Refill(s), Type: Maintenance, Pharmacy: [id number], 1 cap(s) Oral daily.     Orders   Charges (Evaluation and Management):  54797 office outpatient visit 25 minutes (Charge) (Order): Quantity: 1, Urinary retention  Hyperplasia of prostate  Hypertensive heart disease with heart failure  CHF (congestive heart failure)  Hyperlipidemia.

## 2022-02-15 NOTE — TELEPHONE ENCOUNTER
---------------------  From: Thuy Bermeo CMA (Phone Messages Pool ([id number]_[facility]))   To: Unit 2 Pool (32224_[facility]) ; INR Pool ( 32224_[facility]);     Sent: 3/31/2020 2:43:00 PM CDT  Subject: General Message-INR     Phone Message    PCP:   HÉCTOR      Time of Call:  1346       Person Calling:  Cielo Banerjee  Phone number:  [phone] (p)  [phone] (f)    Returned call at: _    Note:   pt being admitted to hospice - needing to know when his last INR was and when he is due next  they have him at 1.25mg Mon and 2.5mg [facility] [phone] now

## 2022-02-15 NOTE — NURSING NOTE
Comprehensive Intake Entered On:  1/15/2019 10:24 AM CST    Performed On:  1/15/2019 10:10 AM CST by Vishnu Funes CMA               Summary   Chief Complaint :   Pt here for needing 90 day prescription refills on his finasteride and fluoxetine sent to [facility]   Weight Measured :   198 lb(Converted to: 198 lb 0 oz, 89.81 kg)    Height Measured :   68 in(Converted to: 5 ft 8 in, 172.72 cm)    Body Mass Index :   30.1 kg/m2 (HI)    Body Surface Area :   2.07 m2   Systolic Blood Pressure :   148 mmHg (HI)    Diastolic Blood Pressure :   76 mmHg   Mean Arterial Pressure :   100 mmHg   Peripheral Pulse Rate :   72 bpm   Vital Signs Comments :   Pulse Irregular   BP Site :   Right arm   Pulse Site :   Radial artery   Temperature Tympanic :   98.1 DegF(Converted to: 36.7 DegC)    Respiratory Rate :   22 br/min (HI)    Oxygen Saturation :   98 %   Vishnu Funes CMA - 1/15/2019 10:10 AM CST   Health Status   Allergies Verified? :   Yes   Medication History Verified? :   Yes   Medical History Verified? :   Yes   Pre-Visit Planning Status :   Completed   Tobacco Use? :   Never smoker   Vishnu Funes CMA - 1/15/2019 10:10 AM CST   Meds / Allergies   (As Of: 1/15/2019 10:24:39 AM CST)   Allergies (Active)   Narcotics  Estimated Onset Date:   Unspecified ; Reactions:   severe constipation ; Created By:   Schoen RN, Alissa; Reaction Status:   Active ; Category:   Drug ; Substance:   Narcotics ; Type:   Allergy ; Updated By:   Schoen RN, Alissa; Reviewed Date:   1/15/2019 10:24 AM CST      Vicodin  Estimated Onset Date:   <not entered> 2011 ; Reactions:   Urinary retention ; Created By:   Berhane Krause PA-C; Reaction Status:   Active ; Category:   Drug ; Substance:   Vicodin ; Type:   Allergy ; Updated By:   Berhane Krause PA-C; Reviewed Date:   1/15/2019 10:24 AM CST        Medication List   (As Of: 1/15/2019 10:24:39 AM CST)   Prescription/Discharge Order    metoprolol  :   metoprolol ; Status:    Prescribed ; Ordered As Mnemonic:   metoprolol succinate 50 mg oral tablet, extended release ; Simple Display Line:   50 mg, 1 tab(s), PO, Daily, 90 tab(s), 3 Refill(s) ; Ordering Provider:   Berhane Krause PA-C; Catalog Code:   metoprolol ; Order Dt/Tm:   3/19/2018 8:14:14 AM          losartan  :   losartan ; Status:   Prescribed ; Ordered As Mnemonic:   losartan 25 mg oral tablet ; Simple Display Line:   25 mg, 1 tab(s), PO, Daily, 90 tab(s), 3 Refill(s) ; Ordering Provider:   Berhane Krause PA-C; Catalog Code:   losartan ; Order Dt/Tm:   3/19/2018 8:12:48 AM          FLUoxetine  :   FLUoxetine ; Status:   Prescribed ; Ordered As Mnemonic:   FLUoxetine 20 mg oral capsule ; Simple Display Line:   20 mg, 1 cap(s), po, daily, 90 cap(s), 3 Refill(s) ; Ordering Provider:   Berhane Krause PA-C; Catalog Code:   FLUoxetine ; Order Dt/Tm:   3/19/2018 8:10:59 AM          finasteride  :   finasteride ; Status:   Prescribed ; Ordered As Mnemonic:   finasteride 5 mg oral tablet ; Simple Display Line:   5 mg, 1 tab(s), PO, Daily, 90 tab(s), 3 Refill(s) ; Ordering Provider:   Berhane Krause PA-C; Catalog Code:   finasteride ; Order Dt/Tm:   3/19/2018 8:10:28 AM          ferrous gluconate  :   ferrous gluconate ; Status:   Prescribed ; Ordered As Mnemonic:   ferrous gluconate 324 mg (37.5 mg elemental iron) oral tablet ; Simple Display Line:   324 mg, 1 tab(s), po, daily, 90 tab(s), 3 Refill(s) ; Ordering Provider:   Berhane Krause PA-C; Catalog Code:   ferrous gluconate ; Order Dt/Tm:   3/19/2018 8:09:39 AM          furosemide  :   furosemide ; Status:   Prescribed ; Ordered As Mnemonic:   furosemide 40 mg oral tablet ; Simple Display Line:   40 mg, 1 tab(s), PO, bid, for 30 day(s), 60 tab(s), 1 Refill(s) ; Ordering Provider:   Berhane Krause PA-C; Catalog Code:   furosemide ; Order Dt/Tm:   3/19/2018 8:07:38 AM          aspirin  :   aspirin ; Status:   Prescribed ; Ordered As Mnemonic:   aspirin 81 mg oral tablet ; Simple  Display Line:   162 mg, 2 tab(s), po, daily, 100 tab(s), 0 Refill(s) ; Ordering Provider:   Berhane Krause PA-C; Catalog Code:   aspirin ; Order Dt/Tm:   9/2/2011 3:37:00 PM            Home Meds    clotrimazole topical  :   clotrimazole topical ; Status:   Processing ; Ordered As Mnemonic:   clotrimazole 1% topical cream ; Action Display:   Complete ; Catalog Code:   clotrimazole topical ; Order Dt/Tm:   1/15/2019 10:24:31 AM          potassium chloride  :   potassium chloride ; Status:   Suspended ; Ordered As Mnemonic:   potassium chloride ; Simple Display Line:   10 mEq, daily ; Catalog Code:   potassium chloride ; Order Dt/Tm:   8/18/2010 2:52:13 PM ; Comment:   every other day          atorvastatin  :   atorvastatin ; Status:   Documented ; Ordered As Mnemonic:   atorvastatin 40 mg oral tablet ; Simple Display Line:   40 mg, 1 tab(s), Oral, daily, Per Cardio note 8/8/2018, 0 Refill(s) ; Catalog Code:   atorvastatin ; Order Dt/Tm:   8/24/2018 2:27:27 PM          amLODIPine  :   amLODIPine ; Status:   Documented ; Ordered As Mnemonic:   amLODIPine 5 mg oral tablet ; Simple Display Line:   5 mg, 1 tab(s), PO, Daily, 30 tab(s), 0 Refill(s) ; Catalog Code:   amLODIPine ; Order Dt/Tm:   5/18/2018 9:02:55 AM          acetaminophen  :   acetaminophen ; Status:   Documented ; Ordered As Mnemonic:   acetaminophen 325 mg oral capsule ; Simple Display Line:   325 mg, 1 cap(s), po, tid, 0 Refill(s) ; Catalog Code:   acetaminophen ; Order Dt/Tm:   1/18/2018 10:13:21 AM            Social History   Social History   (As Of: 1/15/2019 10:24:39 AM CST)   Alcohol:  Current      Current   Comments:  8/18/2010 2:59 PM - Olivia Coates CMA: rare   (Last Updated: 8/18/2010 2:59:42 PM CDT by Olivia Coates CMA)          Tobacco:  Denies Tobacco Use      (Last Updated: 8/18/2010 2:59:53 PM CDT by Olivia Coates CMA )         Substance Abuse:  Denies Substance Abuse      (Last Updated: 3/14/2012 3:14:16 PM CDT by Adrian Argueta MD )          Home and Environment:        Marital status: .   (Last Updated: 9/5/2017 2:18:47 PM CDT by Marsha PALMER, Claudia)            More Vitals   Additional Vitals Info :   146   Systolic Blood Pressure Repeat :   146 mmHg   Diastolic Blood Pressure Repeat :   76 mmHg   BP Site Repeat :   Right arm   Vishnu Funes CMA - 1/15/2019 11:00 AM CST

## 2022-02-15 NOTE — NURSING NOTE
Pt preop px,Ekg and labwork faxed to [facility] Att[facility] Dr Merida f-[phone].  Confirmation received.  Vishnu Funes CMA

## 2022-02-15 NOTE — PROGRESS NOTES
"Chief Complaint    Pt c/o right knee and left rib pain. Pt fell over the weekend. Did hit head but states he's only here for his knee.  History of Present Illness      Chief complaint as above reviewed and confirmed with patient.  Pt presents to the clinic with concerns re: knee pain on the R.  He is accompanied by his daughter.       Pt was recently discharged from [facility] with afib, acute on chronic CHF. Had recent DVT as well.  HE was transitioned to Coumadin and Lovenox at discharge per patient preference due to cost.  He was to return over the weekend for INR but did not come in, he comes in today however for both INR and concerns re: R knee pain which started after fall from ground level at home.  He states he \"passed out\" from drinking too much ETOH. Had about 3 or so shots at home after his discharge friday night.  Got up to use the bathroom and fell to the ground.  He woke up the next morning.  His Daughter was there ( a different one than with him today) and pt states he was \"taking non-sense\" but not unconscious.  He believes he hit his knee during the fall.  Has pain that is mild but able to WB and ambulate well.  Has noted swelling.  Also noted to have area of bruising and swelling of the R periorbital area.  Pt denies any HA, confusion, disorientation, nausea/vomiting, vision changes, hearing changes difficulty with balance.       Daughter does not note any cognitive changes.  Review of Systems      Review of systems is negative with the exception of those noted in HPI          Physical Exam   Vitals & Measurements    T: 96.8   F (Tympanic)  HR: 82(Peripheral)  BP: 122/80            nad appears well      answers questions appropriately and good historian for events surrounding the fall.       lungs CTA      heart RRR      there is a moderate sized hematoma R periorbital area, nontender to palpaiton.       PERRLA      EOMI      CARIAS well, ambulates well      L chest wall discomfort with palpation, L " axillary midline about rib 8-12 region.       no ecchymosis here.      Abdomen: BS active, soft nondistended, nontender to light or deep palpation. nor ebound or peritoneal signs. no masses or hsm.  moderate ecchymosis nontender due to injections in the abdomen.       Knee exam: R knee with full AROM in flexion and extnesion       No laxity with varus and valgus stress testing.       negative Lachman      there is a moderate patellar bursitis mild TTP  .       x-ray keen negative other than bursitis as well as a very small fragment lateral margin, possible chip fx which is quite small.       CT head: negative for acute hemorrhage.  Assessment/Plan       1. Head trauma (S09.90XA)         reassured pt no sign of intracranial hemorrhage, treat hematoma conservatively with ice, rest.  Pt and family deny other concerns for head injury / concussion, pt has some base line memory issues,  Observation closely, if any worsening confusion, disoriention, HA, nausea/vomiting should RTC  or seek medical care.         Ordered:          CT Head w/o Contrast (Request), Priority: STAT, Head trauma  Anticoagulated                2. Patellar bursitis (M70.50)         ice, relative rest, ace for compression to allow bursae to scar down nicely as it improves.                          3. Anticoagulated (Z79.01)         continue Coumadin and Lovenox, repeat in 2 days.  fu with pcp with in the week for post hospital follow up.  concern that comadin monitoring may be difficult for patient as it was in the past, also he admits to regular ETOH use and states he will continue this depite recommendation for no ETOH on comadin.  He is agreeable to keep at 1 drink daily at a maximum for consistency.         Ordered:          CT Head w/o Contrast (Request), Priority: STAT, Head trauma  Anticoagulated                Knee pain (M25.569)         Ordered:          XR Knee Min 3 Views Right (Request), Knee pain                Orders:         Review  "Orders for Potential Authorizations, 09/09/19 12:31:23 CDT  Patient Information     Name:PUMA BAEZ      Address:      [address] [place] [id number]     Sex:Male     YOB: 1935     [phone]     Emergency Contact:HONEY GARRETT     MRN:[id number]     FIN:[id number]     Location:[facility]     Date of Service:09/09/2019      Primary Care Physician:       Jimi MG, Berhane CRISTOBAL, [phone]      Attending Physician:       Asya Reid PA-C, [phone]  Problem List/Past Medical History    Ongoing     Abnormal glucose     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 Iliac vein.     History of MI (myocardial infarction)     Hyperlipidemia     Hyperplasia of prostate       Comments: Benign     Hypertensive heart disease with heart failure     Insomnia     Latent tuberculosis     Left hemiparesis     Lumbar spondylosis     Nonrheumatic mitral valve regurgitation     Obesity     Pain of right thumb       Comments: Chronic     Paroxysmal atrial fibrillation     S/P mitral valve clip implantation     S/P mitral valve clip implantation     Traumatic blindness of left eye       Comments: \"ruptured blood vessel\"     Tuberculosis       Comments: Patient tests positive. See family history.     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, [place] - Acute on chronic systolic congestive heart failure  Procedure/Surgical History     History of inferior vena cava filter placement (12/10/2017)           Mitral valve clip (12/07/2017)            Comments: Complicated by air embolization and intermedius vein graft.     PCI - Percutaneous coronary intervention (12/07/2017)            Comments: via left femoral artery with coronary angiogram.  Intraaortic balloon pump placed and later removed..     Coronary angiogram (09/01/2017)           Extracapsular cataract extraction and insertion of intraocular lens (07/06/2017)            Comments: Right..     Extracapsular cataract extraction and insertion of intraocular lens (06/29/2017)            Comments: Left..     Coronary angiogram (03/06/2017)           Repair of recurrent ventral hernia w/mesh (08/21/2012)           Repair of epigastric hernia (03/15/2012)           right shoulder rotator cuff repair (08.2010)            Comments: R.     Colonoscopy (03.2009)            Comments: Normal.     CABG x 2 - Coronary artery bypass grafts x 2 (11.2007)           laser for photocoagulation (03.2007)           EGD (01.2003)           Flexible sigmoidoscopy (10/12/2000)           Flexible sigmoidoscopy (10/19/1993)           right shoulder decompression (1989)           Wrist Fracture (1987)            Comments: R.     Compression  fracutre of foot (1978)           Back (1975)            Comments: Lumbar and thoracic.. History of spine surgery..     Bilateral vasectomy (1975)           L ankle compression (1975)           Rotator cuff repair (1964)            Comments: left shoulder.     Hernia repair            Comments: Bilateral, Laparoscopy.     ORIF - Open reduction of fracture of ankle with internal fixation            Comments: Right.     vitreoretinal surgery        Medications    acetaminophen 325 mg oral capsule, 325 mg= 1 cap(s), Oral, tid    amLODIPine 5 mg oral tablet, 5 mg= 1 tab(s), Oral, daily    aspirin 81 mg oral tablet, 162 mg= 2 tab(s), Oral, daily    atorvastatin 40 mg oral tablet, 40 mg= 1 tab(s), Oral, daily    ferrous gluconate 324 mg (37.5 mg elemental iron) oral tablet, 324 mg= 1 tab(s), Oral, daily, 3 refills    finasteride 5 mg oral tablet, 5 mg= 1 tab(s), Oral, daily, 3 refills    FLUoxetine 20 mg oral capsule, 40 mg= 2 cap(s), Oral, daily, 3 refills    furosemide 40 mg oral tablet, See Instructions    losartan 50 mg oral tablet, 50 mg= 1 tab(s), Oral, daily    metoprolol succinate 50 mg oral tablet, extended release, 50 mg= 1 tab(s), Oral, daily, 3 refills    potassium chloride 20 mEq oral tablet, extended release, 20 mEq= 1 tab(s), Oral, bid    tamsulosin 0.4 mg oral capsule, 0.4 mg= 1 cap(s), Oral, daily, 3 refills    Xarelto 15 mg oral tablet, 15 mg= 1 tab(s), Oral, bid  Allergies    Narcotics (severe constipation)    Vicodin (Urinary retention)  Social History    Smoking Status - 09/09/2019     Never smoker     Alcohol - Current, 08/18/2010      Current, 08/18/2010     Home/Environment      Marital status: ., 09/05/2017     Substance Abuse - Denies Substance Abuse, 03/14/2012     Tobacco - Denies Tobacco Use, 08/18/2010  Family History    Cancer: Brother, Brother, Brother and Brother.    HTN - Hypertension: Sister, Sister, Brother and Brother.    Leukemia: Sister.    Lymphoma: Sister.    Rheumatoid  arthritis: Brother.    TB - Tuberculosis: Mother and Father.  Immunizations      Vaccine Date Status      influenza virus vaccine, inactivated 10/16/2018 Recorded      pneumococcal (PCV13) 08/28/2018 Given      influenza 11/13/2017 Recorded      influenza 12/26/2016 Recorded      tetanus/diphth/pertuss (Tdap) adult/adol 05/23/2015 Recorded      influenza 10/15/2013 Recorded      influenza virus vaccine, inactivated 11/10/2012 Recorded      influenza, H1N1, inactivated 12/29/2009 Recorded      influenza virus vaccine, inactivated 10/08/2009 Recorded      pneumococcal (PPSV23) 11/29/2001 Recorded      Td 09/26/2000 Recorded  Lab Results       Lab Results (Last 4 results within 90 days)        INR: 2.3 (09/09/19 17:12:00)

## 2022-02-15 NOTE — LETTER
(Inserted Image. Unable to display)   July 18, 2019        PUMA BAEZ  [address], [place] [id number]        Dear PUMA,      Thank you for selecting [facility] (previously [facility]) for your healthcare needs.    Our records indicate you are due for the following services:     Hypertension check ~ please remember to bring your at-home blood pressure readings with you to your appointment.     To schedule an appointment or if you have further questions, please contact your primary clinic:   [facility]       [phone]   [facility]       [phone]              [facility]     [phone]      Powered by [employer]    Sincerely,    Berhane Krause P.A.-C.

## 2022-02-15 NOTE — PROGRESS NOTES
"   Patient:   PUMA BAEZ            MRN: [id number]            FIN: [id number]               Age:   83 years     Sex:  Male     :  1935   Associated Diagnoses:   CHF (congestive heart failure); Nonrheumatic mitral valve regurgitation   Author:   Berhane Krause PA-C      Chief Complaint   2019 8:59 AM CST     Pt here stating he needs \"lab work for his cardiologist\" Pt also c/o all over body pain because he states \"I might of overdid it cutting wood yesterday\"      History of Present Illness   Chief complaint and symptoms noted above and confirmed with patient   He sees [facility] cardiology (Dr Lawrence) and needs labs rechecked  at last cardiology visit they decreased his furosemide to 60 mg daily    he was out cutting wood for 2 hours yesterday,  feeling fatigued and achy today but no cough or cold sxs  SOB and fatigue due to CHF and mitral valve insufficiency      Review of Systems   Constitutional:  Fatigue.    Ear/Nose/Mouth/Throat:  Negative.    Respiratory:  Shortness of breath, No cough.    Cardiovascular:  Negative.       Health Status   Allergies:    Allergic Reactions (Selected)  Severity Not Documented  Narcotics (Severe constipation)  Vicodin (Urinary retention)   Medications:  (Selected)   Prescriptions  Prescribed  FLUoxetine 20 mg oral capsule: = 1 cap(s) ( 20 mg ), po, daily, # 90 cap(s), 3 Refill(s), Type: Maintenance, Pharmacy: [id number], 1 cap(s) Oral daily  aspirin 81 mg oral tablet: 2 tab(s) ( 162 mg ), po, daily, # 100 tab(s), 0 Refill(s), Type: Maintenance, OTC (Rx)  ferrous gluconate 324 mg (37.5 mg elemental iron) oral tablet: 1 tab(s) ( 324 mg ), po, daily, # 90 tab(s), 3 Refill(s), Type: Maintenance  finasteride 5 mg oral tablet: = 1 tab(s) ( 5 mg ), PO, Daily, # 90 tab(s), 3 Refill(s), Type: Maintenance, Pharmacy: [id number], 1 tab(s) Oral daily  furosemide 40 mg oral tablet: See Instructions, Instructions: 1.5 tabs(60 mg) PO daily., # 135 tab(s), 1 " Refill(s), Type: Maintenance  losartan 25 mg oral tablet: 1 tab(s) ( 25 mg ), PO, Daily, # 90 tab(s), 3 Refill(s), Type: Maintenance  metoprolol succinate 50 mg oral tablet, extended release: = 1 tab(s) ( 50 mg ), PO, Daily, # 90 tab(s), 3 Refill(s), Type: Maintenance, Pharmacy: [id number], 1 tab(s) Oral daily  tamsulosin 0.4 mg oral capsule: = 1 cap(s) ( 0.4 mg ), po, daily, # 90 cap(s), 3 Refill(s), Type: Maintenance, Pharmacy: [id number], 1 cap(s) Oral daily  Documented Medications  Documented  acetaminophen 325 mg oral capsule: 1 cap(s) ( 325 mg ), po, tid, 0 Refill(s), Type: Maintenance  amLODIPine 5 mg oral tablet: 1 tab(s) ( 5 mg ), PO, Daily, # 30 tab(s), 0 Refill(s), Type: Maintenance  atorvastatin 40 mg oral tablet: 1 tab(s) ( 40 mg ), Oral, daily, Instructions: Per Cardio note 8/8/2018, 0 Refill(s), Type: Maintenance  Suspended  potassium chloride: ( 10 mEq ), daily, 0 Refill(s), Type: Maintenance,    Medications          *denotes recorded medication          FLUoxetine 20 mg oral capsule: 20 mg, 1 cap(s), po, daily, 90 cap(s), 3 Refill(s).          *acetaminophen 325 mg oral capsule: 325 mg, 1 cap(s), po, tid, 0 Refill(s).          *amLODIPine 5 mg oral tablet: 5 mg, 1 tab(s), PO, Daily, 30 tab(s), 0 Refill(s).          aspirin 81 mg oral tablet: 162 mg, 2 tab(s), po, daily, 100 tab(s), 0 Refill(s).          *atorvastatin 40 mg oral tablet: 40 mg, 1 tab(s), Oral, daily, Per Cardio note 8/8/2018, 0 Refill(s).          ferrous gluconate 324 mg (37.5 mg elemental iron) oral tablet: 324 mg, 1 tab(s), po, daily, 90 tab(s), 3 Refill(s).          finasteride 5 mg oral tablet: 5 mg, 1 tab(s), PO, Daily, 90 tab(s), 3 Refill(s).          furosemide 40 mg oral tablet: See Instructions, 1.5 tabs(60 mg) PO daily., 135 tab(s), 1 Refill(s).          losartan 25 mg oral tablet: 25 mg, 1 tab(s), PO, Daily, 90 tab(s), 3 Refill(s).          metoprolol succinate 50 mg oral tablet, extended release: 50  mg, 1 tab(s), PO, Daily, 90 tab(s), 3 Refill(s).          tamsulosin 0.4 mg oral capsule: 0.4 mg, 1 cap(s), po, daily, 90 cap(s), 3 Refill(s).     Problem list:    All Problems (Selected)  Embolic cerebral infarction / SNOMED CT 4946233733 / Confirmed  Bilateral inguinal hernia / SNOMED CT 445281029 / Confirmed  Hematoma / SNOMED CT 9163688814 / Confirmed  Abnormal glucose / SNOMED CT 068715567 / Confirmed  Latent tuberculosis / SNOMED CT 71975750 / Confirmed  Anemia, blood loss / SNOMED CT 3785680472 / Confirmed  Hernia, ventral / SNOMED CT 9504362616 / Confirmed  Obesity / SNOMED CT 4413362352 / Probable  History of deep venous thrombosis / SNOMED CT 0796201829 / Confirmed  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 / SNOMED CT 3685486127 / Confirmed  S/P mitral valve clip implantation / SNOMED CT 9848463198 / Confirmed  S/P mitral valve clip implantation / SNOMED CT 4780619840 / Confirmed  Insomnia / SNOMED CT 785769089 / Confirmed  History of MI (myocardial infarction) / SNOMED CT 0452910377 / Confirmed  Nonrheumatic mitral valve regurgitation / SNOMED CT 111734636 / Confirmed  Esophageal reflux / SNOMED CT 667335604 / Confirmed  Lumbar spondylosis / SNOMED CT 426572845 / Confirmed  Epistaxis / SNOMED CT 955823092 / Confirmed  Hyperplasia of prostate / SNOMED CT 227412317 / Confirmed  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 45757094 / Confirmed  Urinary retention / SNOMED CT 502459836 / Confirmed  Acute posthemorrhagic anemia / SNOMED CT 444274250 / Confirmed  Left hemiparesis / SNOMED CT 344980006 / Confirmed  Pain of right thumb / SNOMED CT 640629304 / Confirmed  Barretts esophagus / SNOMED CT 797005865 / Confirmed  Chronic alcohol use / SNOMED CT 985336374 / Confirmed  Hematuria / SNOMED CT 166257592 / Confirmed  Foot pain / SNOMED CT 879862562 / Confirmed  CHF (congestive heart failure) / SNOMED CT 26069415 / Confirmed  Hypertensive heart disease with heart failure / SNOMED CT 87504053 /  "Confirmed  CAD (coronary artery disease) / SNOMED CT 40244513 / Confirmed  Hyperlipidemia / SNOMED CT 91808051 / Confirmed  Tuberculosis / SNOMED CT 62972105 / Confirmed      Histories   Past Medical History:    Active  Hematoma (9588638177): Onset on 12/15/2017 at 82 years.  Embolic cerebral infarction (3169263891): Onset on 12/10/2017 at 82 years.  CHF (congestive heart failure) (06193603): Onset in the month of 11/2008 at 73 years  History of MI (myocardial infarction) (6974396262): Onset in the month of 11/2007 at 72 years  Barretts esophagus (751626986): Onset in the month of 1/2003 at 67 years  Traumatic blindness of left eye (46058064): Onset in 1999 at 64 years.  Comments:  8/18/2010 CDT 2:58 PM CDT - Olivia Coates CMA  \"ruptured blood vessel\"  Hypertensive heart disease with heart failure (41544617): Onset in 1989 at 54 years.  Latent tuberculosis (21337841)  CAD (coronary artery disease) (36689505)  Acute posthemorrhagic anemia (818302169)  Hyperlipidemia (88594379)  Epistaxis (620440307)  Comments:  1/18/2018 CST 10:33 AM Thuy Topete  Chronic  Abnormal glucose (892711248)  Foot pain (733955454)  Nonrheumatic mitral valve regurgitation (169338469)  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 (9124613224)  Hematuria (144327261)  History of deep venous thrombosis (6374627193)  Comments:  1/18/2018 CST 9:57 AM Thuy Topete  Right lower extremity.  Iliac vein.  Anemia, blood loss (6655498045)  Tuberculosis (19006237)  Comments:  1/18/2018 CST 10:02 AM Thuy Topete  Patient tests positive.  See family history.  Bilateral inguinal hernia (050606644)  Hernia, ventral (8443920088)  Hyperplasia of prostate (248399188)  Comments:  1/18/2018 CST 10:24 AM Thuy Topete  Benign  Insomnia (951500907)  Pain of right thumb (423999841)  Comments:  1/18/2018 CST 10:36 AM CST - Thuy Hopkins  Chronic  Chronic alcohol use (771525274)  Esophageal reflux (531570257)  Lumbar spondylosis " (319791562)  Resolved  Inpatient stay (993704588): Onset on 2017 at 82 years.  Resolved on 2018 at 82 years.  Comments:  2018 CST 1:09 PM Blossom Art  @[facility], [place], [place] - Embolic cerebral infarction    2018 CST 1:14 PM Blossom Art  [facility] not ANW  H/O herpes zoster (2993210841): Onset on 2009 at 73 years.  Resolved.  Age-related cataract of both eyes (442675618):  Resolved.  Acute kidney injury (97509835):  Resolved.  History of fracture of right ankle (4938769784):  Resolved.  Comments:  2018 CST 10:06 AM Thuy Topete  Right  Burst blood vessel in eye (554572419):  Resolved.  Comments:  2018 CST 10:05 AM Thuy Topete  Left  H/O fracture of wrist (8815048138):  Resolved.  Comments:  2018 CST 10:08 AM Thuy Topete  Right   Family History:    Cancer  Brother ()  Comments:  2010 3:29 PM CDT - Jaxon CMA, Olivia  Lung  Brother  Comments:  2010 3:29 PM CDT - Jaxon CMAOlivia  Oral  Brother ()  Comments:  2010 3:29 PM CDT - Jaxon MAMTA Olivia  colon  Brother ()  Leukemia  Sister (Adrianna, )  Lymphoma  Sister  Rheumatoid arthritis  Brother  TB - Tuberculosis  Father ()  Mother ()  HTN - Hypertension  Brother ()  Sister (Adrianna, )  Sister  Brother ()     Procedure history:    History of inferior vena cava filter placement (3405236864) on 12/10/2017 at 82 Years.  Mitral valve clip (3971386064) on 2017 at 82 Years.  Comments:  2018 1:16 PM Blossom Art  Complicated by air embolization and intermedius vein graft  PCI - Percutaneous coronary intervention (1280529352) on 2017 at 82 Years.  Comments:  2018 1:18 PM Blossom Art  via left femoral artery with coronary angiogram.  Intraaortic balloon pump placed and later removed.  Coronary angiogram (21461690) on 2017 at 81  Years.  Extracapsular cataract extraction and insertion of intraocular lens (898895953) on 7/6/2017 at 81 Years.  Comments:  7/25/2017 12:37 PM IDALIA Rondon Dalila  Right.  Extracapsular cataract extraction and insertion of intraocular lens (000334036) on 6/29/2017 at 81 Years.  Comments:  8/30/2017 1:44 PM IDALIA Rondon Dalila  Left.  Coronary angiogram (05742859) on 3/6/2017 at 81 Years.  Repair of recurrent ventral hernia w/mesh (281648758) on 8/21/2012 at 76 Years.  Repair of epigastric hernia (914228991) on 3/15/2012 at 76 Years.  right shoulder rotator cuff repair in the month of 8/2010 at 74 Years.  Comments:  8/18/2010 3:06 PM RENARD BEVERLY  Colonoscopy (227645853) in the month of 3/2009 at 73 Years.  Comments:  8/18/2010 3:02 PM RENARD BEVERLY  Normal  CABG x 2 - Coronary artery bypass grafts x 2 (182769115) in the month of 11/2007 at 72 Years.  laser for photocoagulation in the month of 3/2007 at 71 Years.  EGD in the month of 1/2003 at 67 Years.  Flexible sigmoidoscopy (90892972) on 10/12/2000 at 65 Years.  Flexible sigmoidoscopy (95815086) on 10/19/1993 at 58 Years.  right shoulder decompression in 1989 at 54 Years.  Wrist Fracture in 1987 at 52 Years.  Comments:  8/18/2010 3:07 PM RENARD BEVERLY  Compression fracutre of foot in 1978 at 43 Years.  L ankle compression in 1975 at 40 Years.  Bilateral vasectomy (623876312) in 1975 at 40 Years.  Back (563299160) in 1975 at 40 Years.  Comments:  1/18/2018 10:26 AM Thuy Topete  Lumbar and thoracic.    1/18/2018 10:23 AM Thuy Topete  History of spine surgery.  Rotator cuff repair (140441679) in 1964 at 29 Years.  Comments:  2/24/2012 10:25 AM Berhane Waggoner PA-C.  left shoulder  Hernia repair (34181903).  Comments:  8/18/2010 3:05 PM CDT - RENARD GORMAN  Bilateral, Laparoscopy  vitreoretinal surgery.  ORIF - Open reduction of fracture of ankle with internal fixation (256010253277890).  Comments:  1/18/2018 10:11 AM CST  - Thuy Hopkins   Social History:        Alcohol Assessment: Current            Current                     Comments:                      08/18/2010 - Jaxon OLEARYOlivia      Tobacco Assessment: Denies Tobacco Use      Substance Abuse Assessment: Denies Substance Abuse      Home and Environment Assessment            Marital status: .      Physical Examination   Vital Signs   2/5/2019 8:59 AM CST Temperature Tympanic 98.2 DegF    Peripheral Pulse Rate 72 bpm    Pulse Site Radial artery    Respiratory Rate 16 br/min    Systolic Blood Pressure 152 mmHg  HI    Diastolic Blood Pressure 78 mmHg    Mean Arterial Pressure 103 mmHg    BP Site Right arm    BP Systolic Repeat 132 mmHg    BP Diastolic Repeat 74 mmHg    Oxygen Saturation 97 %      Measurements from flowsheet : Measurements   2/5/2019 8:59 AM CST Height Measured - Standard 68 in    Weight Measured - Standard 197 lb    BSA 2.07 m2    Body Mass Index 29.95 kg/m2  HI      General:  No acute distress.    Respiratory:  Lungs are clear to auscultation.    Cardiovascular:  No edema, regular rate and rhythm, grade 4/6 holosystolic murmur.    Psychiatric:  Appropriate mood & affect.       Impression and Plan   Diagnosis     CHF (congestive heart failure) (EYT73-CD I50.9).     Nonrheumatic mitral valve regurgitation (NDE54-OE I34.0).     Course:  stable.    Summary:  per cardiology request will check labs.    Orders     Orders   Lab (Gen Lab  Reference Lab):  CBC (includes diff/plt)* ([facility]) (Order): Specimen Type: Blood, Collection Date: 2/5/2019 9:23 AM CST  B type natriuretic peptide (BNP)* ([facility]) (Order): Specimen Type: Plasma, Collection Date: 2/5/2019 9:23 AM CST  Basic Metabolic Panel* ([facility]) (Order): Specimen Type: Serum, Collection Date: 2/5/2019 9:23 AM CST.     Orders   Charges (Evaluation and Management):  12783 office outpatient visit 15 minutes (Charge) (Order): Quantity: 1, CHF (congestive heart failure)  Nonrheumatic  mitral valve regurgitation.

## 2022-02-15 NOTE — CARE COORDINATION
Pt appears on [facility] chronic disease panel as out of parameters for IVD HTN.  Pt was seen 1/15/19 BP was 148/76. Placed RTC 6 months

## 2022-02-15 NOTE — NURSING NOTE
Anticoagulation Therapy Entered On:  2/25/2020 12:11 PM CST    Performed On:  2/25/2020 12:08 PM CST by Karena Freeman RN               Warfarin Management   Planned Duration :   Indefinite   INR Therapeutic Range :   Yes   Week 1 Sunday Dose :   2.5      Week 1 Monday Dose :   1.25      Week 1 Tuesday Dose :   2.5    Karena Freeman RN - 2/25/2020 12:12 PM CST     Week 1 Wednesday Dose :   2.5    Week 1 Thursday Dose :   2.5    Karena Freeman RN 2/25/2020 12:08 PM CST   Week 1 Friday Dose :   2.5    Karena Freeman RN 2/25/2020 12:12 PM CST     Week 1 Saturday Dose :   2.5    Karena Freeman RN 2/25/2020 12:08 PM CST   Week 1 Dose Total :   16.25                            Karena Freeman RN 2/25/2020 12:12 PM CST     Week 2 Saturday Dose :   1.25    Karena Freeman RN 2/25/2020 12:08 PM CST     Karena Freeman RN 2/25/2020 12:12 PM CST     Indication :   Stroke   International Normalization Ratio :   2.2    INR Range :   2.0 - 3.0   Anticoagulation Recheck :   1 week   Warfarin Physician :   Berhane Krause PA-C   Warfarin Special Instructions :   INR = 2.2 per [facility] Patient is to stay on 1.25 mg warfarin on Mon and 2.5mg the rest of the days of the week Recheck INR in 1 week per [facility] Patient daughter advised via call to  cell.   Karena Freeman RN - 2/25/2020 12:08 PM CST   Seizure

## 2022-02-15 NOTE — TELEPHONE ENCOUNTER
---------------------  From: PUMA MOSQUEDA  To: [facility]  Sent: 04/02/2020 09:47 a.m. CDT  Subject: Info on Kingsley Banegas care  Good Morning,    Could you please ask Berhane Krause who he would suggest we talk to in regards to what our dad, Kingsley Mosqueda, should have for care at home? A  to talk to maybe? Right now he's on hospice and doesn't want to be. We would like to find out all our options on what's best for him.    Thank you,  Celeste galvane, LINNETTE OC.---------------------  From: Becca Cruz CMA ([employer] ([id number]_[facility]))   To: Jerry Loza MD;     Sent: 4/2/2020 9:57:58 AM CDT  Subject: FW: Info on Kingsley Banegas care---------------------  From: Jerry Loza MD   To: [employer] ([id number]_[place] [facility] [place]);     Sent: 4/2/2020 10:14:14 AM CDT  Subject: RE: Info on Kingsley Banegas care     Please have care coordinator call today to help with home services and cancel hospice---------------------  From: Becca Cruz CMA ([employer] ([id number]_[facility]))   To: Care Coordinators Pool ([facility]);     Sent: 4/2/2020 10:16:57 AM CDT !  Subject: FW: Info on Kingsley Venegas spoke to Celeste at [phone]. She said that Dad has been expressing the want to go to his doctor when he wants. Doesnt like the idea of not being able to see his doctor. Celeste's sister had taken Dad to the ER a few days ago d/t chest pain and he was sent home because he was on hospice.     I explained to Celeste that once on hospice that patients are essentially forgoing any aggressive treatments or measures to prolong their life. The primary focus is comfort. I told her that she and her family need to have a discussion together to know what Dad's wishes are in terms of  treatment. I told her this is his choice. I advised if he decides to dis-enroll from hospice that is OK. I told her he is allowed to do clinic visits while  "on hospice but again treatment options would be limited unless focused more towards comfort. I advised that having his nurse and  from [facility] doing a care conference would be beneficial. We discussed perhaps home health or palliative care would be more beneficial at this time. Although, home care's focus is towards getting a patient back to their \"baseline\" I am unsure if that is realistic in her Dad's case (likely not). Palliative care would be a good option and then he could transition to hospice as he declined further in the future.     She agreed and expressed understanding of above conversation and she is going to have her sister contact [facility] and ask to speak to Dad's nurse/ to discuss other options. She knows to call if she has further questions.  "

## 2022-02-15 NOTE — RESULTS
Anticoagulation Therapy Entered On:  9/11/2019 8:59 AM CDT    Performed On:  9/11/2019 8:58 AM CDT by Karena Freeman RN               Warfarin Management   INR Therapeutic Range :   Karena Tolbert RN - 9/11/2019 12:22 PM CDT   Warfarin Management and Results Grid   Unusual Bleeding or Bruising :   No   Rectal Bleeding or Black Stools :   No   Karena Freeman RN - 9/11/2019 12:22 PM CDT   Anticoagulation Recheck :   9/16/19   Warfarin Physician :   Jimi MG, Berhane CRISTOBAL   Warfarin Special Instructions :   INR = 3.5 per POC Patient is to take 5mg QOD alternating with 2.5mg QOD and recheck INR on 9/16/19 per DG Patient advised in office visit.   Karena Freeman RN - 9/11/2019 12:22 PM CDT   Planned Duration :   Indefinite   Indication :   Atrial fibrillation   Warfarin Management Comments :   [facility]  [address], [address]  [phone]  Capillary Specimen     International Normalization Ratio :   3.5    INR Range :   2.0 - 3.0   Karena Freeman RN - 9/11/2019 8:59 AM CDT

## 2022-02-15 NOTE — TELEPHONE ENCOUNTER
---------------------  From: Yamila/Maria Elena PALMER ([employer] ([id number]_[facility]))   Sent: 11/19/2019 9:24:53 AM CST  Subject: Diarrhea      Phone Message    PCP: LINNETTE    Time of Call: 0920     Phone number: None, talked to pt's friend       Note: Friend stated that pt has had the diarrhea since last night and they are wondering what they could do to help it. Advised patient of the BRAT diet, drink plenty of liquids and eat foods that are easy on the stomach.     Advised patient of information in Telephone Triage protocols for nurses book on page 171- Adult diarrhea.    Advised patient to call if things get worse.

## 2022-02-15 NOTE — RESULTS
Anticoagulation Therapy Entered On:  9/23/2019 9:05 AM CDT    Performed On:  9/23/2019 9:03 AM CDT by Yamila Bedolla RN               Warfarin Management   Week 1 Monday Dose :   1.25    Week 1 Tuesday Dose :   2.5    Week 1 Wednesday Dose :   1.25    Week 1 Thursday Dose :   2.5    Week 1 Friday Dose :   1.25    Week 1 Saturday Dose :   2.5    Week 2 Sunday Dose :   1.25    Week 2 Monday Dose :   2.5    Week 2 Tuesday Dose :   1.25    Indication :   Atrial fibrillation, DVT   Warfarin Management Comments :   Lab test performed by:  [facility] Office  [address] [address]  Phone # [phone]  Fax# [phone]  Capillary   International Normalization Ratio :   1.8    INR Range :   2.0 - 3.0   INR Therapeutic Range :   No   Warfarin Abnormal Findings :   Holding warfarin sinced 9/20/19 due to tooth extraction scheduled 9/23/19   Yamila Bedolla RN - 9/23/2019 9:03 AM CDT   Warfarin Management and Results Grid   Signs of Thrombolic :   No   Signs of Warfarin Intolerance :   No   Changes in Diet or Alcohol Intake :   No   Changes in Medication or Antibiotics :   No   Unusual Bleeding or Bruising :   No   Rectal Bleeding or Black Stools :   No   Vitamin K Food Handout :   No   Heart Valve Replacement :   No   Yamila Bedolla RN - 9/23/2019 9:03 AM CDT   Anticoagulation Recheck :   1 week   Warfarin Special Instructions :   Per DW resume warfarin tonight at 1.25 mg alternating 2.5 mg; recheck INR in 1 week; pt and daughter notified in office.   Yamila Bedolla RN - 9/23/2019 9:03 AM CDT

## 2022-02-15 NOTE — NURSING NOTE
Comprehensive Intake Entered On:  8/16/2019 9:30 AM CDT    Performed On:  8/16/2019 9:17 AM CDT by Vishnu Funes CMA               Summary   Chief Complaint :   Pt here for productive cough x week.  Pt also here for post hospital visit for SOB and Right leg DVT   Weight Measured :   195 lb(Converted to: 195 lb 0 oz, 88.45 kg)    Height Measured :   68 in(Converted to: 5 ft 8 in, 172.72 cm)    Body Mass Index :   29.65 kg/m2 (HI)    Body Surface Area :   2.06 m2   Systolic Blood Pressure :   132 mmHg (HI)    Diastolic Blood Pressure :   88 mmHg (HI)    Mean Arterial Pressure :   103 mmHg   Peripheral Pulse Rate :   88 bpm   BP Site :   Right arm   Pulse Site :   Radial artery   Temperature Tympanic :   97.2 DegF(Converted to: 36.2 DegC)  (LOW)    Respiratory Rate :   20 br/min   Oxygen Saturation :   99 %   Vishnu Funes CMA - 8/16/2019 9:17 AM CDT   Health Status   Allergies Verified? :   Yes   Medication History Verified? :   Yes   Medical History Verified? :   Yes   Pre-Visit Planning Status :   Not completed   Tobacco Use? :   Never smoker   Vishnu Funes CMA - 8/16/2019 9:17 AM CDT   Meds / Allergies   (As Of: 8/16/2019 9:30:11 AM CDT)   Allergies (Active)   Narcotics  Estimated Onset Date:   Unspecified ; Reactions:   severe constipation ; Created By:   Schoen RN, Alissa; Reaction Status:   Active ; Category:   Drug ; Substance:   Narcotics ; Type:   Allergy ; Updated By:   Schoen RN, Alissa; Reviewed Date:   8/16/2019 9:27 AM CDT      Vicodin  Estimated Onset Date:   <not entered> 2011 ; Reactions:   Urinary retention ; Created By:   Berhane Krause PA-C; Reaction Status:   Active ; Category:   Drug ; Substance:   Vicodin ; Type:   Allergy ; Updated By:   Berhane Krause PA-C; Reviewed Date:   8/16/2019 9:27 AM CDT        Medication List   (As Of: 8/16/2019 9:30:11 AM CDT)   Prescription/Discharge Order    acetaminophen-oxycodone  :   acetaminophen-oxycodone ; Status:   Completed ; Ordered As Mnemonic:    acetaminophen-oxycodone 325 mg-5 mg oral tablet ; Simple Display Line:   1 tab(s), Oral, q8 hrs, PRN: as needed for pain, 40 tab(s), 0 Refill(s) ; Ordering Provider:   Berhane Krause PA-C; Catalog Code:   acetaminophen-oxycodone ; Order Dt/Tm:   3/19/2019 10:25:17 AM          losartan  :   losartan ; Status:   Completed ; Ordered As Mnemonic:   losartan 25 mg oral tablet ; Simple Display Line:   25 mg, 1 tab(s), PO, Daily, 90 tab(s), 3 Refill(s) ; Ordering Provider:   Berhane Krause PA-C; Catalog Code:   losartan ; Order Dt/Tm:   3/19/2018 8:12:48 AM          tamsulosin  :   tamsulosin ; Status:   Prescribed ; Ordered As Mnemonic:   tamsulosin 0.4 mg oral capsule ; Simple Display Line:   0.4 mg, 1 cap(s), po, daily, 90 cap(s), 3 Refill(s) ; Ordering Provider:   Berhane Krause PA-C; Catalog Code:   tamsulosin ; Order Dt/Tm:   1/15/2019 10:52:54 AM          finasteride  :   finasteride ; Status:   Prescribed ; Ordered As Mnemonic:   finasteride 5 mg oral tablet ; Simple Display Line:   5 mg, 1 tab(s), PO, Daily, 90 tab(s), 3 Refill(s) ; Ordering Provider:   Berhane Krause PA-C; Catalog Code:   finasteride ; Order Dt/Tm:   1/15/2019 10:49:10 AM          FLUoxetine  :   FLUoxetine ; Status:   Prescribed ; Ordered As Mnemonic:   FLUoxetine 20 mg oral capsule ; Simple Display Line:   40 mg, 2 cap(s), po, daily, 90 cap(s), 3 Refill(s) ; Ordering Provider:   Dr Danika Esposito; Catalog Code:   FLUoxetine ; Order Dt/Tm:   1/15/2019 10:48:58 AM          metoprolol  :   metoprolol ; Status:   Prescribed ; Ordered As Mnemonic:   metoprolol succinate 50 mg oral tablet, extended release ; Simple Display Line:   50 mg, 1 tab(s), PO, Daily, 90 tab(s), 3 Refill(s) ; Ordering Provider:   Berhane Krause PA-C; Catalog Code:   metoprolol ; Order Dt/Tm:   1/15/2019 10:48:42 AM          ferrous gluconate  :   ferrous gluconate ; Status:   Prescribed ; Ordered As Mnemonic:   ferrous gluconate 324 mg (37.5 mg elemental iron) oral  tablet ; Simple Display Line:   324 mg, 1 tab(s), po, daily, 90 tab(s), 3 Refill(s) ; Ordering Provider:   Berhane Krause PA-C; Catalog Code:   ferrous gluconate ; Order Dt/Tm:   3/19/2018 8:09:39 AM          aspirin  :   aspirin ; Status:   Prescribed ; Ordered As Mnemonic:   aspirin 81 mg oral tablet ; Simple Display Line:   162 mg, 2 tab(s), po, daily, 100 tab(s), 0 Refill(s) ; Ordering Provider:   Berhane Krause PA-C; Catalog Code:   aspirin ; Order Dt/Tm:   9/2/2011 3:37:00 PM            Home Meds    potassium chloride  :   potassium chloride ; Status:   Suspended ; Ordered As Mnemonic:   potassium chloride ; Simple Display Line:   10 mEq, daily ; Catalog Code:   potassium chloride ; Order Dt/Tm:   8/18/2010 2:52:13 PM ; Comment:   every other day          potassium chloride  :   potassium chloride ; Status:   Documented ; Ordered As Mnemonic:   potassium chloride 20 mEq oral tablet, extended release ; Simple Display Line:   20 mEq, 1 tab(s), Oral, bid, 60 tab(s), 0 Refill(s) ; Catalog Code:   potassium chloride ; Order Dt/Tm:   8/16/2019 9:27:40 AM          losartan  :   losartan ; Status:   Documented ; Ordered As Mnemonic:   losartan 50 mg oral tablet ; Simple Display Line:   50 mg, 1 tab(s), Oral, daily, 30 tab(s), 0 Refill(s) ; Catalog Code:   losartan ; Order Dt/Tm:   8/16/2019 9:20:37 AM          rivaroxaban  :   rivaroxaban ; Status:   Documented ; Ordered As Mnemonic:   Xarelto 15 mg oral tablet ; Simple Display Line:   15 mg, 1 tab(s), Oral, bid, 15 mg bid for 3 weeks then 20 mg daily, 0 Refill(s) ; Catalog Code:   rivaroxaban ; Order Dt/Tm:   8/16/2019 9:19:06 AM          furosemide  :   furosemide ; Status:   Documented ; Ordered As Mnemonic:   furosemide 40 mg oral tablet ; Simple Display Line:   See Instructions, Take 80mg in the AM and 20mg in the afternoon  Per cardio from 7/3/19, 0 Refill(s) ; Catalog Code:   furosemide ; Order Dt/Tm:   7/8/2019 8:34:49 AM          atorvastatin  :    atorvastatin ; Status:   Documented ; Ordered As Mnemonic:   atorvastatin 40 mg oral tablet ; Simple Display Line:   40 mg, 1 tab(s), Oral, daily, Per Cardio note 8/8/2018, 0 Refill(s) ; Catalog Code:   atorvastatin ; Order Dt/Tm:   8/24/2018 2:27:27 PM          amLODIPine  :   amLODIPine ; Status:   Documented ; Ordered As Mnemonic:   amLODIPine 5 mg oral tablet ; Simple Display Line:   5 mg, 1 tab(s), PO, Daily, 30 tab(s), 0 Refill(s) ; Catalog Code:   amLODIPine ; Order Dt/Tm:   5/18/2018 9:02:55 AM          acetaminophen  :   acetaminophen ; Status:   Documented ; Ordered As Mnemonic:   acetaminophen 325 mg oral capsule ; Simple Display Line:   325 mg, 1 cap(s), po, tid, 0 Refill(s) ; Catalog Code:   acetaminophen ; Order Dt/Tm:   1/18/2018 10:13:21 AM            Social History   Social History   (As Of: 8/16/2019 9:30:11 AM CDT)   Alcohol:  Current      Current   Comments:  8/18/2010 2:59 PM - Olivia Coates CMA: rare   (Last Updated: 8/18/2010 2:59:42 PM CDT by Olivia Coates CMA)          Tobacco:  Denies Tobacco Use      (Last Updated: 8/18/2010 2:59:53 PM CDT by Olivia Coates CMA )         Substance Abuse:  Denies Substance Abuse      (Last Updated: 3/14/2012 3:14:16 PM CDT by Adrian Argueta MD )         Home/Environment:        Marital status: .   (Last Updated: 9/5/2017 2:18:47 PM CDT by Claudia Larson MA)

## 2022-02-15 NOTE — TELEPHONE ENCOUNTER
---------------------  From: Chioma ORTEGA, Yamila VIEIRA   To: INR Pool ( [id number]);     Sent: 5/19/2020 9:30:09 AM CDT  Subject: INR LELAND     Call received from Jocelyne Meyer  Her and Pt are not available for INR on 5/21/20 so it will be done on 5/20/20.  I told her this is fine.    Her phone #: [phone]

## 2022-02-15 NOTE — CARE COORDINATION
Patient:   PUMA BAEZ            MRN: [id number]            FIN: [id number]               Age:   84 years     Sex:  Male     :  1935   Associated Diagnoses:   None   Author:   Olivia Coates CMA      Sources of Information:  [ ] Patient, family member, or caregiver (Please list):  [x ] Hospital discharge summary  [ ] Hospital fax  [ ] List of recent hospitalizations or ED visits  [ ] Other:     Discharged From: [facility]  Discharge Date: 3/28/2020    Diagnosis/Problem: Acute on chronic systolic heart failure, atrial fib w/ rapid ventricular response, JANELLE    Medication Changes: [x ] Yes [ ] No   Medication List Updated: [x ] Yes [ ] No  Hospital medication list reconciled with clinic medication list: yes  Medications          *denotes recorded medication          *acetaminophen 325 mg oral capsule: 650 mg, 2 cap(s), po, q4 hrs, not to exceed 4000 mg/day, 0 Refill(s).          *bumetanide 2 mg oral tablet: 2 mg, 1 tab(s), Oral, bid, morning and noontime, 30 tab(s), 0 Refill(s).          finasteride 5 mg oral tablet: See Instructions, TAKE 1 TABLET BY MOUTH ONCE DAILY, 90 tab(s), 1 Refill(s).          losartan 50 mg oral tablet: 50 mg, 1 tab(s), Oral, daily, 90 tab(s), 1 Refill(s).          metoprolol succinate 50 mg oral tablet, extended release: 50 mg, 1 tab(s), PO, Daily, 90 tab(s), 3 Refill(s).          nitroglycerin 0.4 mg sublingual tablet: 0.4 mg, 1 tab(s), SL, q5 min, If chest pain persists 5 minutes after first dose, call 911. May repeat Q5min x 3 doses., PRN: for chest pain, 25 tab(s), 0 Refill(s).          *potassium chloride 20 mEq oral tablet, extended release: 20 mEq, 1 tab(s), Oral, bid, 60 tab(s), 0 Refill(s).          rosuvastatin 40 mg oral tablet: 40 mg, 1 tab(s), Oral, daily, 30 tab(s), 2 Refill(s).          tamsulosin 0.4 mg oral capsule: 1 cap(s), Oral, daily, 270 unknown unit.          warfarin 2.5 mg oral tablet: See Instructions, TAKE 1 TABLET BY MOUTH EVERY OTHER DAY  ALTERNATE WITH TAKE 0.5 TABLET EVERY OTHER DAY, 135 tab(s), 1 Refill(s).      Needs Referral or Lab: [ ] Yes [x ] No    Needs Follow-up Appointment: N/A  [ ] Within 7 days of discharge (highly complex visit)  [ ] Within 14 days of discharge (moderately complex visit)    Appointment Made With: N/A  Date:    Pt d/c to home with hospice cares. No further CC f/u needed at this time.

## 2022-02-15 NOTE — RESULTS
Anticoagulation Therapy Entered On:  12/9/2019 2:26 PM CST    Performed On:  12/9/2019 2:14 PM CST by Karena Freeman RN               Warfarin Management   Week 1 Monday Dose :   2.5    Week 1 Tuesday Dose :   1.25    Week 1 Wednesday Dose :   2.5    Planned Duration :   Indefinite   Indication :   Stroke   Warfarin Management Comments :   [facility] Office  [address], [address]  [phone]  Capillary Specimen     International Normalization Ratio :   1.8    INR Range :   2.0 - 3.0   INR Therapeutic Range :   No   Warfarin Abnormal Findings :   fall large bruise on hand, nose bleed Sat and Sun. Held warfarin Sat and Sun. Discuss with DG. Directions called to [facility].   Anticoagulation Recheck :   1 week   Warfarin Special Instructions :   INR = 1.8 per POC Patient is to take 2.5mg warfarin QOD alternating with 1.25mg QOD. Has been advised to hold warfarin starting 12/12/19 by cardiologist for Surgical valve clip on 12/18/19. Preop px scheduled for 1 week [facility] check INR then   Karena Freeman RN - 12/9/2019 2:20 PM CST

## 2022-02-15 NOTE — TELEPHONE ENCOUNTER
---------------------  From: Jennifer Woodward   To: Kelsy Glez Kaity;     Sent: 4/17/2020 1:36:21 PM CDT  Subject: General Message     FAMILY CALLED AND CANCELED APPT FOR FOLLOW UP TODAY. PT IS ON HOSPICE.---------------------  From: Kelsy Glez Kaity   To: Vince Santiago MD; Jimi MG, Berhane CRISTOBAL;     Sent: 4/17/2020 2:23:12 PM CDT  Subject: MTM appt cancelled - per pt since on hospice     Itz Acosta and Amor Alejo     I called patient for MTM visit scheduled today before I saw this message - whoops!  Apparently they called and cancelled 30 min before appt time.    Daughter informed me that he is on hospice (sounds like there had been some back and forth with this decision making) and so they cancelled MTM appointment for today.  They stated that they were working with GG.  I informed them that I am still happy to help with any medication needs or questions.  Feel free to loop me in again if needed. Otherwise, I will not plan a follow-up with pt unless another referral is made.    Thanks.  KB---------------------  From: Vince Santiago MD   To: Kelsy Glez Kaity;     Sent: 4/17/2020 2:30:13 PM CDT  Subject: RE: MTM appt cancelled - per pt since on hospice     Thanks

## 2022-02-15 NOTE — LETTER
(Inserted Image. Unable to display)   February 26, 2020      PUMA BAEZ      [address], [place] [id number]        Dear PUMA,    Thank you for selecting [facility] for your healthcare needs.  Below you will find the results of you recent tests done at our clinic.     Your hemoglobin is low due to recent bleeding.  Please return in a week for recheck of these labs.      Result Name Current Result Previous Result Reference Range   Sodium Level (mmol/L)  139 2/25/2020  140 2/5/2019 135 - 146   Potassium Level (mmol/L) ((L)) 3.4 2/25/2020  4.3 2/5/2019 3.5 - 5.3   Chloride Level (mmol/L)  102 2/25/2020  104 2/5/2019 98 - 110   CO2 Level (mmol/L)  29 2/25/2020  32 2/5/2019 20 - 32   Glucose Level (mg/dL) ((H)) 109 2/25/2020  93 2/5/2019 65 - 99   BUN (mg/dL) ((H)) 27 2/25/2020  17 2/5/2019 7 - 25   Creatinine Level (mg/dL) ((H)) 1.19 2/25/2020 ((H)) 1.20 2/5/2019 0.70 - 1.11   BUN/Creat Ratio ((H)) 23 2/25/2020  14 2/5/2019 6 - 22   eGFR (mL/min/1.73m2) ((L)) 56 2/25/2020 ((L)) 56 2/5/2019 > OR = 60 -    eGFR  (mL/min/1.73m2)  65 2/25/2020  64 2/5/2019 > OR = 60 -    Calcium Level (mg/dL) ((L)) 8.5 2/25/2020  9.2 2/5/2019 8.6 - 10.3   WBC  6.5 2/25/2020  5.1 2/5/2019 3.8 - 10.8   RBC ((L)) 3.10 2/25/2020  4.39 2/5/2019 4.20 - 5.80   Hgb (gm/dL) ((L)) 9.3 2/25/2020  14.1 2/5/2019 13.2 - 17.1   Hct (%) ((L)) 28.3 2/25/2020  40.2 2/5/2019 38.5 - 50.0   MCV (fL)  91.3 2/25/2020  91.6 2/5/2019 80.0 - 100.0   MCH (pg)  30.0 2/25/2020  32.1 2/5/2019 27.0 - 33.0   MCHC (gm/dL)  32.9 2/25/2020  35.1 2/5/2019 32.0 - 36.0   RDW (%)  14.2 2/25/2020  12.6 2/5/2019 11.0 - 15.0   Platelet  205 2/25/2020  172 2/5/2019 140 - 400   MPV (fL)  10.2 2/25/2020  10.9 2/5/2019 7.5 - 12.5       Please contact me or my assistant at [phone] if you have any questions.     Sincerely,        Berhane Krause PA-C    What do your labs mean?  Below is a glossary of commonly ordered  labs:  LDL   Bad Cholesterol   HDL   Good Cholesterol  AST/ALT   Liver Function   Cr/Creatinine   Kidney Function  Microalbumin   Kidney Function  BUN   Kidney Function  PSA   Prostate    TSH   Thyroid Hormone  HgbA1c   Diabetes Test   Hgb (Hemoglobin)   Red Blood Cells

## 2022-02-15 NOTE — PROGRESS NOTES
Patient:   PUMA BAEZ            MRN: [id number]            FIN: [id number]               Age:   83 years     Sex:  Male     :  1935   Associated Diagnoses:   None   Author:   Robert Latham MD      Chief Complaint       2019 11:59 AM CST Right sided nose bleeds on and off for 3-4 days, stopped taking his Aspirin   2019 11:42 AM CST Pt here for nose bleeds on and off since x 3-4 days         History of Present Illness   Asked to see by Berhane Krause for evaluation of nosebleeds. Patient reports recurrent bleeding from right side of the nose. It began earlier in the week while cutting fire wood. He has a history of bloody noses with prior cauterizaiton.       Review of Systems   Constitutional:  Negative.    Ear/Nose/Mouth/Throat:  Negative except as documented in history of present illness.    Respiratory:  Negative.       Health Status   Allergies:    Allergic Reactions (Selected)  Severity Not Documented  Narcotics (Severe constipation)  Vicodin (Urinary retention)   Medications:  (Selected)   Prescriptions  Prescribed  FLUoxetine 20 mg oral capsule: = 1 cap(s) ( 20 mg ), po, daily, # 90 cap(s), 3 Refill(s), Type: Maintenance, Pharmacy: [id number], 1 cap(s) Oral daily  aspirin 81 mg oral tablet: 2 tab(s) ( 162 mg ), po, daily, # 100 tab(s), 0 Refill(s), Type: Maintenance, OTC (Rx)  ferrous gluconate 324 mg (37.5 mg elemental iron) oral tablet: 1 tab(s) ( 324 mg ), po, daily, # 90 tab(s), 3 Refill(s), Type: Maintenance  finasteride 5 mg oral tablet: = 1 tab(s) ( 5 mg ), PO, Daily, # 90 tab(s), 3 Refill(s), Type: Maintenance, Pharmacy: [id number], 1 tab(s) Oral daily  furosemide 40 mg oral tablet: See Instructions, Instructions: 1.5 tabs(60 mg) PO daily., # 135 tab(s), 1 Refill(s), Type: Maintenance  losartan 25 mg oral tablet: 1 tab(s) ( 25 mg ), PO, Daily, # 90 tab(s), 3 Refill(s), Type: Maintenance  metoprolol succinate 50 mg oral tablet, extended release: = 1 tab(s) ( 50 mg  ), PO, Daily, # 90 tab(s), 3 Refill(s), Type: Maintenance, Pharmacy: [id number], 1 tab(s) Oral daily  tamsulosin 0.4 mg oral capsule: = 1 cap(s) ( 0.4 mg ), po, daily, # 90 cap(s), 3 Refill(s), Type: Maintenance, Pharmacy: [id number], 1 cap(s) Oral daily  Documented Medications  Documented  acetaminophen 325 mg oral capsule: 1 cap(s) ( 325 mg ), po, tid, 0 Refill(s), Type: Maintenance  amLODIPine 5 mg oral tablet: 1 tab(s) ( 5 mg ), PO, Daily, # 30 tab(s), 0 Refill(s), Type: Maintenance  atorvastatin 40 mg oral tablet: 1 tab(s) ( 40 mg ), Oral, daily, Instructions: Per Cardio note 8/8/2018, 0 Refill(s), Type: Maintenance  Suspended  potassium chloride: ( 10 mEq ), daily, 0 Refill(s), Type: Maintenance   Problem list:    All Problems (Selected)  Abnormal glucose / SNOMED CT 965586522 / Confirmed  Acute posthemorrhagic anemia / SNOMED CT 315321221 / Confirmed  Anemia, blood loss / SNOMED CT 7688822146 / Confirmed  Barretts esophagus / SNOMED CT 206151198 / Confirmed  Hyperplasia of prostate / SNOMED CT 619333673 / Confirmed  Bilateral inguinal hernia / SNOMED CT 067534463 / Confirmed  Epistaxis / SNOMED CT 895820235 / Confirmed  Hematuria / SNOMED CT 111840510 / Confirmed  CHF (congestive heart failure) / SNOMED CT 51208524 / Confirmed  CAD (coronary artery disease) / SNOMED CT 03764027 / Confirmed  Chronic alcohol use / SNOMED CT 537128563 / Confirmed  Embolic cerebral infarction / SNOMED CT 9990305315 / Confirmed  Foot pain / SNOMED CT 374744722 / Confirmed  Esophageal reflux / SNOMED CT 994975033 / Confirmed  History of deep venous thrombosis / SNOMED CT 3423862840 / Confirmed  Hematoma / SNOMED CT 3934631958 / Confirmed  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 / SNOMED CT 2513047710 / Confirmed  Hernia, ventral / SNOMED CT 4207316955 / Confirmed  S/P mitral valve clip implantation / SNOMED CT 8180659738 / Confirmed  [facility]  "Confirmed  History of MI (myocardial infarction) / SNOMED CT 6557183342 / Confirmed  Hyperlipidemia / SNOMED CT 58895621 / Confirmed  Hypertensive heart disease with heart failure / SNOMED CT 98138050 / Confirmed  Latent tuberculosis / SNOMED CT 14839051 / Confirmed  Insomnia / SNOMED CT 441659489 / Confirmed  Left hemiparesis / SNOMED CT 346021980 / Confirmed  Lumbar spondylosis / SNOMED CT 085122804 / Confirmed  Nonrheumatic mitral valve regurgitation / SNOMED CT 537093338 / Confirmed  Obesity / SNOMED CT 5750033824 / Probable  Pain of right thumb / SNOMED CT 521711587 / Confirmed  Urinary retention / SNOMED CT 885060306 / Confirmed  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 00000229 / Confirmed  Tuberculosis / SNOMED CT 67017027 / Confirmed      Histories   Past Medical History:    Active  Hematoma (0152759707): Onset on 12/15/2017 at 82 years.  Embolic cerebral infarction (3858654099): Onset on 12/10/2017 at 82 years.  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  Comments:  8/18/2010 CDT 2:58 PM CDT - Olivia Coates CMA  \"ruptured blood vessel\"  Hypertensive heart disease with heart failure (48051745): Onset in 1989 at 54 years.  Latent tuberculosis (18155755)  CAD (coronary artery disease) (70503238)  Acute posthemorrhagic anemia (418192568)  Hyperlipidemia (89487377)  Epistaxis (211029641)  Comments:  1/18/2018 CST 10:33 AM CST - Thuy Hopkins  Chronic  Abnormal glucose (373450275)  Foot pain (355023197)  Nonrheumatic mitral valve regurgitation (389470716)  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 (0043637596)  Hematuria (797141055)  History of deep venous thrombosis (2636280513)  Comments:  1/18/2018 CST 9:57 AM CST " Thuy Camarena  Right lower extremity.  Iliac vein.  Anemia, blood loss (2032782090)  Tuberculosis (18029895)  Comments:  2018 CST 10:02 AM Thuy Topete  Patient tests positive.  See family history.  Bilateral inguinal hernia (682528183)  Hernia, ventral (3823596532)  Hyperplasia of prostate (445000547)  Comments:  2018 CST 10:24 AM Thuy Topete  Benign  Insomnia (410341948)  Pain of right thumb (871983868)  Comments:  2018 CST 10:36 AM Thuy Topete  Chronic  Chronic alcohol use (628473194)  Esophageal reflux (309602635)  Lumbar spondylosis (400206735)  Resolved  Inpatient stay (892146561): Onset on 2017 at 82 years.  Resolved on 2018 at 82 years.  Comments:  2018 CST 1:09 PM Blossom Art  @[facility], [facility], [place] - Embolic cerebral infarction    2018 CST 1:14 PM [facility]  H/O herpes zoster (2589592842): Onset on 2009 at 73 years.  Resolved.  Age-related cataract of both eyes (641900641):  Resolved.  Acute kidney injury (89502796):  Resolved.  History of fracture of right ankle (7707487253):  Resolved.  Comments:  2018 CST 10:06 AM Thuy Topete  Right  Burst blood vessel in eye (767866533):  Resolved.  Comments:  2018 CST 10:05 AM Thuy Topete  Left  H/O fracture of wrist (9633150289):  Resolved.  Comments:  2018 CST 10:08 AM Thuy Topete  Right   Family History:    Cancer  Brother ()  Comments:  2010 3:29 PM CDT - Jaxon MAMTA, Olivia  Lung  Brother  Comments:  2010 3:29 PM CDT - Jaxon CMA, Olivia  Oral  Brother ()  Comments:  2010 3:29 PM CDT - Jaxon CMA, Olivia  colon  Brother ()  Leukemia  Sister (Adrianna, )  Lymphoma  Sister  Rheumatoid arthritis  Brother  TB - Tuberculosis  Father ()  Mother ()  HTN - Hypertension  Brother ()  Sister (Adrianna, )  Sister  Brother ()      Procedure history:    History of inferior vena cava filter placement (6473317776) on 12/10/2017 at 82 Years.  Mitral valve clip (3291775006) on 12/7/2017 at 82 Years.  Comments:  1/31/2018 1:16 PM TRISTIN Alejo Blossom Varela  Complicated by air embolization and intermedius vein graft  PCI - Percutaneous coronary intervention (5207606699) on 12/7/2017 at 82 Years.  Comments:  1/31/2018 1:18 PM TRISTIN Alejo Leia Varelari  via left femoral artery with coronary angiogram.  Intraaortic balloon pump placed and later removed.  Coronary angiogram (03295130) on 9/1/2017 at 81 Years.  Extracapsular cataract extraction and insertion of intraocular lens (315713912) on 7/6/2017 at 81 Years.  Comments:  7/25/2017 12:37 PM Dalila Estrada  Right.  Extracapsular cataract extraction and insertion of intraocular lens (386867898) on 6/29/2017 at 81 Years.  Comments:  8/30/2017 1:44 PM Dalila Estrada  Left.  Coronary angiogram (84642508) on 3/6/2017 at 81 Years.  Repair of recurrent ventral hernia w/mesh (506904141) on 8/21/2012 at 76 Years.  Repair of epigastric hernia (621852968) on 3/15/2012 at 76 Years.  right shoulder rotator cuff repair in the month of 8/2010 at 74 Years.  Comments:  8/18/2010 3:06 PM RENARD BEVERLY  R  Colonoscopy (760166843) in the month of 3/2009 at 73 Years.  Comments:  8/18/2010 3:02 PM RENARD BEVERLY  CABG x 2 - Coronary artery bypass grafts x 2 (036315349) in the month of 11/2007 at 72 Years.  laser for photocoagulation in the month of 3/2007 at 71 Years.  EGD in the month of 1/2003 at 67 Years.  Flexible sigmoidoscopy (90622519) on 10/12/2000 at 65 Years.  Flexible sigmoidoscopy (78615556) on 10/19/1993 at 58 Years.  right shoulder decompression in 1989 at 54 Years.  Wrist Fracture in 1987 at 52 Years.  Comments:  8/18/2010 3:07 PM CDT - RENARD GORMAN  Compression fracutre of foot in 1978 at 43 Years.  L ankle compression in 1975 at 40 Years.  Bilateral vasectomy (821420456) in 1975 at  40 Years.  Back (438006448) in 1975 at 40 Years.  Comments:  1/18/2018 10:26 AM Thuy Topete  Lumbar and thoracic.    1/18/2018 10:23 AM Thuy Topete  History of spine surgery.  Rotator cuff repair (553929737) in 1964 at 29 Years.  Comments:  2/24/2012 10:25 AM CST - Berhane Krause PA-C.  left shoulder  Hernia repair (66379750).  Comments:  8/18/2010 3:05 PM CDT - RENARD GORMAN  Bilateral, Laparoscopy  vitreoretinal surgery.  ORIF - Open reduction of fracture of ankle with internal fixation ([id number]).  Comments:  1/18/2018 10:11 AM Thuy Topete  Right      Physical Examination   Vital Signs   2/8/2019 11:42 AM CST Temperature Tympanic 97.1 DegF  LOW    Peripheral Pulse Rate 64 bpm    Pulse Site Radial artery    Respiratory Rate 20 br/min    Systolic Blood Pressure 162 mmHg  HI    Diastolic Blood Pressure 86 mmHg  HI    Mean Arterial Pressure 111 mmHg    BP Site Right arm    Oxygen Saturation 96 %    Vital Signs Comments Pulse Irregular      Measurements from flowsheet : Measurements   2/8/2019 11:59 AM CST Height Measured - Standard 68 in    Weight Measured - Standard 196 lb    BSA 2.06 m2    Body Mass Index 29.8 kg/m2  HI   2/8/2019 11:42 AM CST Height Measured - Standard 68 in    Weight Measured - Standard 196 lb    BSA 2.06 m2    Body Mass Index 29.8 kg/m2  HI      CONSTITUTIONAL  General Appearance:  Normal, well developed, well nourished, no obvious distress  Ability to Communicate:  communicates appropriately.    HEAD AND FACE  Appearance and Symmetry:  Normal, no scalp or facial scarring or suspicious lesions.    EARS  Clinical speech reception threshold:  Normal, able to hear normal speech.  Auricle:  Normal, Auricles without scars, lesions, masses.  External auditory canal:  Normal, External auditory canal normal.  Tympanic membrane:  Normal, Tympanic membranes normal without swelling or erythema.  Tympanic membrane mobility:  Normal, Normal tympanic membrane mobility.    NOSE  (speculum or scope)  Architecture:  Normal, Grossly normal external nasal architecture with no masses or lesions.  Mucosa:  Normal mucosa, No polyps or masses.  Septum:  Prominent vascularity right anterior septum.  Turbinates:  Normal, No turbinate abnormalities    ORAL CAVITY AND OROPHARYNX  Lips:  Normal.  Dental and gingiva:  Normal, No obvious dental or gingival disease.  Mucosa:  Normal, Moist mucous membranes.  Tongue:  Normal, Tongue mobile with no mucosal abnormalities  Hard and soft palate:  Normal, Hard and soft palate without cleft or mucosal lesions.  Oral pharynx:  Normal, Posterior pharynx without lesions or remarkable asymmetry.  Saliva:  Normal, Clear saliva.  Masses:  Normal, No palpable masses or pathologically enlarged lymph nodes.    NECK  Masses/lymph nodes:  Normal, No worrisome neck masses or lymph nodes.  Salivary glands:  Normal, Parotid and submandibular glands.  Trachea and larynx position:  Normal, Trachea and larynx midline.  Thyroid:  Normal, No thyroid abnormality.  Tenderness:  Normal, No cervical tenderness.  Suppleness:  Normal, Neck supple    NEUROLOGICAL  Speech pattern:  Normal, Proasaic    RESPIRATORY  Symmetry and Respiratory effort:  Normal, Symmetric chest movement and expansion with no increased intercostal retractions or use of accessory muscles.       Impression and Plan   EPISTAXIS TREATMENT  The right nose was sprayed with lidocaine plus epinephrine solution.  After allowing adequate time for anesthesia the area in questions was treated with topical silver nitrate.  The patient tolerated the procedure without difficulty.  The patient was instructed to apply Bacitracin, Neosporin or other topical antibiotic to the anterior septum twice a day for 2 weeks to prevent infection and crusting.

## 2022-02-15 NOTE — NURSING NOTE
Geriatric Depression Screening Entered On:  3/26/2020 1:34 PM CDT    Performed On:  3/20/2020 1:33 PM CDT by Vishnu Funes CMA               Geriatric Depression Screening   Geriatric Depression Satisfied Life :   Yes   Geriatric Depression Dropped Activities :   No   Geriatric Depression Life Empty :   No   Geriatric Depression Bored :   No   Geriatric Depression Good Spirits :   Yes   Geriatric Depression Afraid Bad Things :   No   Geriatric Depression Feel Happy :   Yes   Geriatric Depression Feel Helpless :   No   Geriatric Depression Prefer to Stay Home :   No   Geriatric Depression Memory Problems :   No   Geriatric Depression Wonderful Be Alive :   Yes   Geriatric Depression Feel Worthless :   Yes   Geriatric Depression Situation Hopeless :   No   Geriatric Depression Others Better Off :   No   Geriatric Depression Full of Energy :   No   Geriatric Depression Total Score :   2    Vishnu Funes CMA - 3/26/2020 1:33 PM CDT

## 2022-02-15 NOTE — NURSING NOTE
Anticoagulation Therapy Management Entered On:  6/4/2020 3:33 PM CDT    Performed On:  6/4/2020 3:31 PM CDT by Yamila Bedolla RN               Anticoagulation Visit Assessment   Type of Visit - Anticoagulation :   Telephone   Anticoagulation Indication :   Atrial fibrillation, DVT prophylaxis   Anticoagulation Medication Verified :   Yes   Yamila Bedolla RN - 6/4/2020 3:31 PM CDT   Anticoagulation Patient Assessment Grid   Change in Alcohol Consumption :   No   Change in Diet :   No   Change in Medications :   No   Diarrhea :   No   Rectal Bleeding :   No   Signs of Clotting :   No   Signs of Warfarin Intolerance :   No   Unusual Bleeding, Bruising :   No   Upcoming Procedures :   No   Vomiting :   No   Yamila Bedolla RN - 6/4/2020 3:31 PM CDT   Patient on Warfarin :   Yes   Yamila Bedolla RN - 6/4/2020 3:31 PM CDT   Warfarin   Anticoagulant INR Goal Lower :   2    Anticoagulant INR Goal Upper :   3    INR Home Monitoring Result :   1.9    Sunday :   2.5 mg   Monday :   2.5 mg   Tuesday :   2.5 mg   Wednesday :   2.5 mg   Thursday :   2.5 mg   Friday :   2.5 mg   Saturday :   2.5 mg   Total Dose :   17.5 mg   Warfarin Pt Reported Previous Week Dose :    Sun Mon Tues Wed Thurs Fri Sat Weekly Total Dose   Week 1                   Week 2                   Week 3                   Week 4                         Patient is taking single or multiple strength tablet(s) :   Single strength tab(s)   One Tab Strength :   2.5 mg tab   Sunday :   2.5 mg   Monday :   2.5 mg   Tuesday :   2.5 mg   Wednesday :   2.5 mg   Thursday :   2.5 mg   Friday :   2.5 mg   Saturday :   2.5 mg   Week 1 Total Dose :   17.5 mg   Sunday :   1 tab(s)   Monday :   1 tab(s)   Tuesday :   1 tab(s)   Wednesday :   1 tab(s)   Thursday :   1 tab(s)   Friday :   1 tab(s)   Saturday :   1 tab(s)   Patient Instructions :   Call received from nurse Jocelyne with [facility]; INR today = 1.9; per protocol continue warfarin 2.5 mg daily; recheck INR in 2 weeks;  Jocelyne notified by phone at [phone]     Chioma ORTEGA, Yamila VIEIRA - 6/4/2020 3:31 PM CDT

## 2022-02-15 NOTE — PROGRESS NOTES
Patient:   PUMA BAEZ            MRN: [id number]            FIN: [id number]               Age:   84 years     Sex:  Male     :  1935   Associated Diagnoses:   CAD (coronary artery disease); CHF (congestive heart failure); Chronic mitral valve regurgitation; Paroxysmal atrial fibrillation   Author:   Berhane Krause PA-C      Chief Complaint   2020 11:20 AM CST    Pt here for Post Op/Hospital visit for Mitral Clip placement done in December.  Pt is c/o nose bleeds since starting warfarin     HPI: Chief complaint and symptoms noted above and confirmed with patient   he had a second mitral valve replacement done in December because the first attempt failed  ejection fraction is 35% (up from 30% before the procedure)  he is feeling weak and dizzy  has been having some nose bleeds  his legs ache, but he is breathing better  he is seeing cardiology next week         Review of Systems   Constitutional:  Fatigue.    Respiratory:  Negative.    Cardiovascular:  Negative.       Health Status   Allergies:    Allergic Reactions (All)  Severity Not Documented  Narcotics (Severe constipation)  Vicodin (Urinary retention)  Canceled/Inactive Reactions (All)  No known allergies   Medications:  (Selected)   Prescriptions  Prescribed  FLUoxetine 20 mg oral capsule: = 2 cap(s) ( 40 mg ), po, daily, # 90 cap(s), 3 Refill(s), Type: Maintenance, Pharmacy: [facility] #[id number]  aspirin 81 mg oral tablet: 2 tab(s) ( 162 mg ), po, daily, # 100 tab(s), 0 Refill(s), Type: Maintenance, OTC (Rx)  ferrous gluconate 324 mg (37.5 mg elemental iron) oral tablet: 1 tab(s) ( 324 mg ), po, daily, # 90 tab(s), 3 Refill(s), Type: Maintenance  finasteride 5 mg oral tablet: = 1 tab(s) ( 5 mg ), PO, Daily, # 90 tab(s), 3 Refill(s), Type: Maintenance, Pharmacy: [facility] #[id number], 1 tab(s) Oral daily  metoprolol succinate 50 mg oral tablet, extended release: = 1 tab(s) ( 50 mg ), PO, Daily, # 90 tab(s), 3 Refill(s), Type: Maintenance,  Pharmacy: [facility] #[id number], 1 tab(s) Oral daily  tamsulosin 0.4 mg oral capsule: 1 cap(s), Oral, daily, # 270 unknown unit, Type: Soft Stop, Pharmacy: [facility]  warfarin 2.5 mg oral tablet: See Instructions, Instructions: TAKE 1 TABLET BY MOUTH EVERY OTHER DAY ALTERNATE WITH TAKE 0.5 TABLET EVERY OTHER DAY, # 135 tab(s), 1 Refill(s), Type: Maintenance, Pharmacy: [facility]  Documented Medications  Documented  acetaminophen 325 mg oral capsule: 1 cap(s) ( 325 mg ), po, tid, 0 Refill(s), Type: Maintenance  amLODIPine 5 mg oral tablet: 1 tab(s) ( 5 mg ), PO, Daily, # 30 tab(s), 0 Refill(s), Type: Maintenance  atorvastatin 80 mg oral tablet: = 1 tab(s) ( 80 mg ), Oral, daily, # 30 tab(s), 0 Refill(s), Type: Maintenance  bumetanide 2 mg oral tablet: = 1 tab(s) ( 2 mg ), Oral, daily, # 30 tab(s), 0 Refill(s), Type: Maintenance  clopidogrel 75 mg oral tablet: = 1 tab(s) ( 75 mg ), Oral, daily, # 30 tab(s), 0 Refill(s), Type: Maintenance  docusate sodium 100 mg oral capsule: = 1 cap(s) ( 100 mg ), Oral, qhs, PRN: for constipation, # 20 cap(s), 0 Refill(s), Type: Maintenance  hydrALAZINE 10 mg oral tablet: = 1 tab(s) ( 10 mg ), Oral, q8 hrs, 0 Refill(s), Type: Maintenance  isosorbide dinitrate 10 mg oral tablet: = 1 tab(s) ( 10 mg ), Oral, bid, # 120 tab(s), 0 Refill(s), Type: Maintenance  losartan 50 mg oral tablet: = 1 tab(s) ( 50 mg ), Oral, daily, # 30 tab(s), 0 Refill(s), Type: Maintenance  potassium chloride 20 mEq oral tablet, extended release: = 1 tab(s) ( 20 mEq ), Oral, bid, # 60 tab(s), 0 Refill(s), Type: Maintenance   Problem list:    All Problems (Selected)  Embolic cerebral infarction / SNOMED CT 2804142027 / Confirmed  Bilateral inguinal hernia / SNOMED CT 687878860 / Confirmed  Hematoma / SNOMED CT 3413039544 / Confirmed  Abnormal glucose / SNOMED CT 284031622 / Confirmed  Anticoagulated on warfarin / SNOMED CT 06598250 / Confirmed  Latent tuberculosis / SNOMED CT 43664063 / Confirmed  Anemia,  blood loss / SNOMED CT 3427958348 / Confirmed  Hernia, ventral / SNOMED CT 6385436892 / Confirmed  Obesity / SNOMED CT 0477204114 / Probable  History of deep venous thrombosis / SNOMED CT 9262062427 / Confirmed  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 / SNOMED CT 5656347639 / Confirmed  S/P mitral valve clip implantation / SNOMED CT 8833370575 / Confirmed  S/P mitral valve clip implantation / SNOMED CT 1508056603 / Confirmed  Insomnia / SNOMED CT 535194205 / Confirmed  History of MI (myocardial infarction) / SNOMED CT 5951166445 / Confirmed  Nonrheumatic mitral valve regurgitation / SNOMED CT 689239736 / Confirmed  Esophageal reflux / SNOMED CT 488397662 / Confirmed  Lumbar spondylosis / SNOMED CT 672472058 / Confirmed  Epistaxis / SNOMED CT 154190334 / Confirmed  Hyperplasia of prostate / SNOMED CT 075767174 / Confirmed  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 85894623 / Confirmed  Urinary retention / SNOMED CT 208504686 / Confirmed  Acute posthemorrhagic anemia / SNOMED CT 696802481 / Confirmed  Left hemiparesis / SNOMED CT 073089874 / Confirmed  Paroxysmal atrial fibrillation / SNOMED CT 415178943 / Confirmed  Pain of right thumb / SNOMED CT 973154750 / Confirmed  Barretts esophagus / SNOMED CT 832420983 / Confirmed  Chronic alcohol use / SNOMED CT 021702608 / Confirmed  Hematuria / SNOMED CT 895989684 / Confirmed  Foot pain / SNOMED CT 446356790 / Confirmed  Depression / SNOMED CT 42250890 / Confirmed  CHF (congestive heart failure) / SNOMED CT 16356700 / Confirmed  Hypertensive heart disease with heart failure / SNOMED CT 82811852 / Confirmed  Chronic mitral valve regurgitation / SNOMED CT 77393527 / Confirmed  CAD (coronary artery disease) / SNOMED CT 66901265 / Confirmed  Hyperlipidemia / SNOMED CT 18437695 / Confirmed  Tuberculosis / SNOMED CT 71153449 / Confirmed      Histories   Past Medical History:    Active  Hematoma (6529608374): Onset on 12/15/2017 at 82 years.  Embolic cerebral  "infarction (5471986105): Onset on 12/10/2017 at 82 years.  CHF (congestive heart failure) (95855973): Onset in the month of 11/2008 at 73 years  History of MI (myocardial infarction) (4175814845): Onset in the month of 11/2007 at 72 years  Barretts esophagus (698973024): Onset in the month of 1/2003 at 67 years  Traumatic blindness of left eye (08220386): Onset in 1999 at 64 years.  Comments:  8/18/2010 CDT 2:58 PM CDT - Olivia Coates CMA  \"ruptured blood vessel\"  Hypertensive heart disease with heart failure (32101404): Onset in 1989 at 54 years.  Latent tuberculosis (64841337)  CAD (coronary artery disease) (16985637)  Acute posthemorrhagic anemia (941073879)  Hyperlipidemia (53856347)  Epistaxis (362761130)  Comments:  1/18/2018 CST 10:33 AM Thuy Topete  Chronic  Abnormal glucose (358893089)  Foot pain (310914967)  Nonrheumatic mitral valve regurgitation (004587762)  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 (8393308161)  Hematuria (657621829)  History of deep venous thrombosis (6599599543)  Comments:  1/18/2018 CST 9:57 AM Thuy Topete  Right lower extremity.  Iliac vein.    7/29/2019 CDT 9:53 AM CDT - Blossom Varela  07/25/2019 - right leg DVT  Anemia, blood loss (1165395953)  Tuberculosis (20044865)  Comments:  1/18/2018 CST 10:02 AM Thuy Topete  Patient tests positive.  See family history.  Bilateral inguinal hernia (626777618)  Hernia, ventral (7773172873)  Hyperplasia of prostate (767876737)  Comments:  1/18/2018 CST 10:24 AM Thuy Topete  Benign  Insomnia (932974898)  Pain of right thumb (767829384)  Comments:  1/18/2018 CST 10:36 AM Thuy Topete  Chronic  Chronic alcohol use (250076540)  Esophageal reflux (535876403)  Lumbar spondylosis (337281067)  Paroxysmal atrial fibrillation (826952836)  Depression (21642486)  Chronic mitral valve regurgitation (27587482)  Resolved  Inpatient stay (647697676): Onset on 11/8/2019 at 84 years.  Resolved on 11/15/2019 at 84 " years.  Comments:  12/3/2019 CST 2:40 PM Tere Escudero  @[place], [place] - CHF (congestive heart failure), NYHA class 1, acute on chronic, combined.  Inpatient stay (430141794): Onset on 2019 at 83 years.  Resolved on 2019 at 83 years.  Comments:  2019 CDT 10:15 AM Blossom Aly  @[place], [place] - Acute on chronic systolic congestive heart failure  Inpatient stay (568410803): Onset on 2019 at 83 years.  Resolved on 2019 at 83 years.  Comments:  2019 CDT 9:47 AM Blossom Aly  @[place], [place] - Acute on chronic systolic and diastolic CHF exacerbation secondary to severe mitral regurgitation  Inpatient stay (786888694): Onset on 2017 at 82 years.  Resolved on 2018 at 82 years.  Comments:  2018 CST 1:09 PM Blossom Art  @[place], [place] - Embolic cerebral infarction    2018 CST 1:14 PM Blossom Art  [facility] not [facility]  H/O herpes zoster (6306782551): Onset on 2009 at 73 years.  Resolved.  Age-related cataract of both eyes (020304368):  Resolved.  Acute kidney injury (40107163):  Resolved.  History of fracture of right ankle (0237062053):  Resolved.  Comments:  2018 CST 10:06 AM Thuy Topete  Right  Burst blood vessel in eye (908500323):  Resolved.  Comments:  2018 CST 10:05 AM Thuy Topete  Left  H/O fracture of wrist (8931254152):  Resolved.  Comments:  2018 CST 10:08 AM Thuy Topete  Right   Family History:    Cancer  Brother ()  Comments:  2010 3:29 PM CDT - Jaxon MAMTA, Olivia  Lung  Brother  Comments:  2010 3:29 PM CDT - Jaxon MAMTA, Olivia  Oral  Brother ()  Comments:  2010 3:29 PM CDT - Jaxon CMA, Olivia  colon  Brother ()  Leukemia  Sister (Adrianna, )  Lymphoma  Sister  Rheumatoid arthritis  Brother  TB - Tuberculosis  Father ()  Mother  ()  HTN - Hypertension  Brother ()  Sister (Adrianna, )  Sister  Brother ()     Procedure history:    Mitral valve clip (2691844331) on 2019 at 84 Years.  History of inferior vena cava filter placement (9142156963) on 12/10/2017 at 82 Years.  Mitral valve clip (9961181434) on 2017 at 82 Years.  Comments:  2018 1:16 PM TRISTIN Varela Leiari  Complicated by air embolization and intermedius vein graft  PCI - Percutaneous coronary intervention (7048193435) on 2017 at 82 Years.  Comments:  2018 1:18 PM TRISTIN Alejo Avery Blossom  via left femoral artery with coronary angiogram.  Intraaortic balloon pump placed and later removed.  Coronary angiogram (99532456) on 2017 at 81 Years.  Extracapsular cataract extraction and insertion of intraocular lens (744637742) on 2017 at 81 Years.  Comments:  2017 12:37 PM Dalila Estrada  Right.  Extracapsular cataract extraction and insertion of intraocular lens (257521043) on 2017 at 81 Years.  Comments:  2017 1:44 PM Dalila Estrada  Left.  Coronary angiogram (72647452) on 3/6/2017 at 81 Years.  Repair of recurrent ventral hernia w/mesh (065803959) on 2012 at 76 Years.  Repair of epigastric hernia (308442590) on 3/15/2012 at 76 Years.  right shoulder rotator cuff repair in the month of 2010 at 74 Years.  Comments:  2010 3:06 PM RENARD BEVERLY  R  Colonoscopy (554212561) in the month of 3/2009 at 73 Years.  Comments:  2010 3:02 PM RENARD BEVERLY  Normal  CABG x 2 - Coronary artery bypass grafts x 2 (042528540) in the month of 2007 at 72 Years.  laser for photocoagulation in the month of 3/2007 at 71 Years.  EGD in the month of 2003 at 67 Years.  Flexible sigmoidoscopy (67543526) on 10/12/2000 at 65 Years.  Flexible sigmoidoscopy (47647291) on 10/19/1993 at 58 Years.  right shoulder decompression in  at 54 Years.  Wrist Fracture in  at 52 Years.  Comments:  2010  3:07 PM RENARD BEVERLY  Compression fracutre of foot in 1978 at 43 Years.  L ankle compression in 1975 at 40 Years.  Bilateral vasectomy (913891817) in 1975 at 40 Years.  Back (704112681) in 1975 at 40 Years.  Comments:  1/18/2018 10:26 AM Thuy Topete  Lumbar and thoracic.    1/18/2018 10:23 AM Thuy Topete  History of spine surgery.  Rotator cuff repair (201144944) in 1964 at 29 Years.  Comments:  2/24/2012 10:25 AM TRISTIN - Berhane Krause PA-C.  left shoulder  Hernia repair (36004568).  Comments:  8/18/2010 3:05 PM RENARD BEVERLY  Bilateral, Laparoscopy  vitreoretinal surgery.  ORIF - Open reduction of fracture of ankle with internal fixation (281775708105446).  Comments:  1/18/2018 10:11 AM Thuy Topete  Right   Social History:        Alcohol Assessment: Current            Current                     Comments:                      08/18/2010 - Renard Coates CMA                     rare      Tobacco Assessment: Denies Tobacco Use      Substance Abuse Assessment: Denies Substance Abuse      Home and Environment Assessment            Marital status: .        Physical Examination   Vital Signs   1/9/2020 11:20 AM CST Temperature Tympanic 96.4 DegF  LOW    Peripheral Pulse Rate 88 bpm    Pulse Site Radial artery    Respiratory Rate 20 br/min    Systolic Blood Pressure 128 mmHg    Diastolic Blood Pressure 72 mmHg    Mean Arterial Pressure 91 mmHg    BP Site Right arm    Oxygen Saturation 96 %      Measurements from flowsheet : Measurements   1/9/2020 11:20 AM CST Height Measured - Standard 68 in    Weight Measured - Standard 187 lb    BSA 2.02 m2    Body Mass Index 28.43 kg/m2  HI      General:  No acute distress.    Respiratory:  Lungs are clear to auscultation.    Cardiovascular:  Irregularly irregular rhythm, No murmur, No edema.    Psychiatric:  Appropriate mood & affect.       Review / Management   Documentation reviewed:  Records from referring facility.       Impression and  Plan   Diagnosis     CAD (coronary artery disease) (DMW19-LN I25.10).     Paroxysmal atrial fibrillation (EIF48-LQ I48.0).     CHF (congestive heart failure) (PWU71-ZE I50.9).     Chronic mitral valve regurgitation (KIL44-RL I34.0).     Orders     Orders   Pharmacy:  hydrALAZINE 10 mg oral tablet (Prescribe): = 1 tab(s) ( 10 mg ), Oral, q8 hrs, # 90 tab(s), 3 Refill(s), Type: Maintenance, Pharmacy: [facility], 1 tab(s) Oral q8 hrs  metoprolol succinate 50 mg oral tablet, extended release (Prescribe): = 1 tab(s) ( 50 mg ), PO, Daily, # 90 tab(s), 3 Refill(s), Type: Maintenance, Pharmacy: [facility], 1 tab(s) Oral daily  hydrALAZINE 10 mg oral tablet (Discontinue)  metoprolol succinate 50 mg oral tablet, extended release (Modify): = 1 tab(s) ( 50 mg ), PO, Daily, # 90 tab(s), 3 Refill(s), Type: Hard Stop, Pharmacy: [facility] #[id number].     Plan:  he is stable, will renew his metoprolol and hydralazine, will refer to cardiac rehab.    Orders     Orders   Requests (Procedures):  Physical Therapy (Request) (Order): Instructions: cardiac rehab, Chronic mitral valve regurgitation  CHF (congestive heart failure)  CAD (coronary artery disease).     Orders   Charges (Evaluation and Management):  [id number] office outpatient visit 25 minutes (Charge) (Order): Quantity: 1, Chronic mitral valve regurgitation  CHF (congestive heart failure)  CAD (coronary artery disease)  Paroxysmal atrial fibrillation.

## 2022-02-15 NOTE — TELEPHONE ENCOUNTER
---------------------  From: Chioma ORTEGA, Yamila VIEIRA   To: INR Pool ( [id number] - [place]);     Sent: 9/9/2019 8:20:23 AM CDT  Subject: INR question     Pt started warfarin 5 mg daily with lovenox bridging last week. He fell and hurt his knee and was hoping to be seen today. His daughter is calling from work to see what they need to do. I advised he be seen today by a provider about the knee and that we can do an INR at that time. If he does not come in for a provider appointment then he NEEDS to have an INR done. Daughter states she will call patient and have him schedule something right away.Patient seen in clinic today

## 2022-02-15 NOTE — NURSING NOTE
Comprehensive Intake Entered On:  2/8/2019 12:00 PM CST    Performed On:  2/8/2019 11:59 AM CST by Alissa Green MA               Summary   Chief Complaint :   Right sided nose bleeds on and off for 3-4 days, stopped taking his Aspirin    Weight Measured :   196 lb(Converted to: 196 lb 0 oz, 88.90 kg)    Height Measured :   68 in(Converted to: 5 ft 8 in, 172.72 cm)    Body Mass Index :   29.8 kg/m2 (HI)    Body Surface Area :   2.06 m2   Alissa Green MA - 2/8/2019 11:59 AM CST   Health Status   Allergies Verified? :   Yes   Medication History Verified? :   Yes   Medical History Verified? :   No   Pre-Visit Planning Status :   N/A   Tobacco Use? :   Never smoker   Alissa Green MA - 2/8/2019 11:59 AM CST   Consents   Consent for Immunization Exchange :   Consent Granted   Consent for Immunizations to Providers :   Consent Granted   Alissa Green MA - 2/8/2019 11:59 AM CST

## 2022-02-15 NOTE — NURSING NOTE
Anticoagulation Therapy Management Entered On:  6/18/2020 3:36 PM CDT    Performed On:  6/18/2020 3:29 PM CDT by Karena Freeman RN               Anticoagulation Visit Assessment   Type of Visit - Anticoagulation :   Telephone   Anticoagulation Indication :   Atrial fibrillation, DVT prophylaxis   Anticoagulant Duration :   Undetermined   Anticoagulation Medication Verified :   No   Karena Freeman RN - 6/18/2020 3:29 PM CDT   Anticoagulation Patient Assessment Grid   Change in Alcohol Consumption :   Yes   (Comment: drinks over week end and wine last night [Karena Freeman RN - 6/18/2020 3:29 PM CDT] )   Change in Diet :   No   Change in Medications :   No   Diarrhea :   No   Rectal Bleeding :   No   Signs of Clotting :   No   Signs of Warfarin Intolerance :   No   Unusual Bleeding, Bruising :   No   Upcoming Procedures :   No   Vomiting :   No   Karena Freeman RN - 6/18/2020 3:29 PM CDT   Patient on Warfarin :   Yes   Patient on Other Anticoagulant :   No   Karena Freeman RN - 6/18/2020 3:29 PM CDT   Warfarin   Anticoagulant INR Goal Lower :   2    Anticoagulant INR Goal Upper :   3    Information Given by :   Other: its learning Jocelyne   Sunday :   2.5 mg   Monday :   2.5 mg   Tuesday :   2.5 mg   Wednesday :   2.5 mg   Thursday :   2.5 mg   Friday :   2.5 mg   Saturday :   2.5 mg   Total Dose :   17.5 mg   Warfarin Pt Reported Previous Week Dose :    Sun Mon Tues Wed Thurs Fri Sat Weekly Total Dose   Week 1                   Week 2                   Week 3                   Week 4                         Patient is taking single or multiple strength tablet(s) :   Single strength tab(s)   One Tab Strength :   2.5 mg tab   Sunday :   2.5 mg   Monday :   2.5 mg   Tuesday :   2.5 mg   Wednesday :   2.5 mg   Thursday :   0 mg   Friday :   1.25 mg   Saturday :   2.5 mg   Week 1 Total Dose :   13.75 mg   Sunday :   1 tab(s)   Monday :   1 tab(s)   Tuesday :   1 tab(s)   Wednesday :   1 tab(s)   Thursday  :   0 tab(s)   Friday :   0.5 tab(s)   Saturday :   1 tab(s)   Sunday :   2.5 mg   Monday :   2.5 mg   Tuesday :   1.25 mg   Wednesday :   2.5 mg   Thursday :   2.5 mg   Friday :   1.25 mg   Saturday :   2.5 mg   Week 2 Total Dose :   15 mg   Sunday :   1 tab(s)   Monday :   1 tab(s)   Tuesday :   0.5 tab(s)   Wednesday :   1 tab(s)   Thursday :   1 tab(s)   Friday :   0.5 tab(s)   Saturday :   1 tab(s)   Patient Instructions :   INR = 3.9 today  per [facility] nurse Jocelyne call.  Patient is to hold warfarin today then take 1.25mg warfarin on Tues and Fri and 2.5mg the rest of the days of the week Recheck INR in 1 week. This is a decrease of weekly warfarin dose of 14% per protocol. Jocelyne from [facility] advised via call.     Karena Freeman RN - 6/18/2020 3:29 PM CDT   Medication History   Medication List   (As Of: 6/18/2020 3:36:15 PM CDT)   Prescription/Discharge Order    nitroglycerin  :   nitroglycerin ; Status:   Prescribed ; Ordered As Mnemonic:   nitroglycerin 0.4 mg sublingual tablet ; Simple Display Line:   0.4 mg, 1 tab(s), SL, q5 min, If chest pain persists 5 minutes after first dose, call 911. May repeat Q5min x 3 doses., PRN: for chest pain, 25 tab(s), 0 Refill(s) ; Ordering Provider:   Berhane Krause PA-C; Catalog Code:   nitroglycerin ; Order Dt/Tm:   3/19/2020 4:08:14 PM CDT          rosuvastatin  :   rosuvastatin ; Status:   Prescribed ; Ordered As Mnemonic:   rosuvastatin 40 mg oral tablet ; Simple Display Line:   40 mg, 1 tab(s), Oral, daily, 30 tab(s), 2 Refill(s) ; Ordering Provider:   Berhane Krause PA-C; Catalog Code:   rosuvastatin ; Order Dt/Tm:   3/20/2020 3:26:32 PM CDT          losartan  :   losartan ; Status:   Prescribed ; Ordered As Mnemonic:   losartan 50 mg oral tablet ; Simple Display Line:   50 mg, 1 tab(s), Oral, daily, 90 tab(s), 1 Refill(s) ; Ordering Provider:   Berhane Krause PA-C; Catalog Code:   losartan ; Order Dt/Tm:   3/25/2020 2:13:15 PM CDT          metoprolol  :    metoprolol ; Status:   Prescribed ; Ordered As Mnemonic:   metoprolol succinate 50 mg oral tablet, extended release ; Simple Display Line:   50 mg, 1 tab(s), PO, Daily, 90 tab(s), 3 Refill(s) ; Ordering Provider:   Berhane Krause PA-C; Catalog Code:   metoprolol ; Order Dt/Tm:   1/9/2020 11:49:05 AM CST          tamsulosin 0.4 mg oral capsule  :   tamsulosin 0.4 mg oral capsule ; Status:   Prescribed ; Ordered As Mnemonic:   tamsulosin 0.4 mg oral capsule ; Simple Display Line:   1 cap(s), Oral, daily, 270 unknown unit ; Ordering Provider:   Berhane Krause PA-C; Catalog Code:   tamsulosin ; Order Dt/Tm:   10/2/2019 1:08:15 PM CDT          warfarin  :   warfarin ; Status:   Prescribed ; Ordered As Mnemonic:   warfarin 2.5 mg oral tablet ; Simple Display Line:   See Instructions, TAKE 1 TABLET BY MOUTH EVERY OTHER DAY ALTERNATE WITH TAKE 0.5 TABLET EVERY OTHER DAY, 135 tab(s), 1 Refill(s) ; Ordering Provider:   Berhane Krause PA-C; Catalog Code:   warfarin ; Order Dt/Tm:   12/10/2019 8:14:15 PM CST          finasteride  :   finasteride ; Status:   Prescribed ; Ordered As Mnemonic:   finasteride 5 mg oral tablet ; Simple Display Line:   See Instructions, TAKE 1 TABLET BY MOUTH ONCE DAILY, 90 tab(s), 1 Refill(s) ; Ordering Provider:   Berhane Krause PA-C; Catalog Code:   finasteride ; Order Dt/Tm:   3/9/2020 1:40:47 PM CDT            Home Meds    bumetanide  :   bumetanide ; Status:   Documented ; Ordered As Mnemonic:   bumetanide 2 mg oral tablet ; Simple Display Line:   2 mg, 1 tab(s), Oral, bid, morning and noontime, 30 tab(s), 0 Refill(s) ; Catalog Code:   bumetanide ; Order Dt/Tm:   1/8/2020 1:22:15 PM CST          potassium chloride  :   potassium chloride ; Status:   Documented ; Ordered As Mnemonic:   potassium chloride 20 mEq oral tablet, extended release ; Simple Display Line:   20 mEq, 1 tab(s), Oral, bid, 60 tab(s), 0 Refill(s) ; Catalog Code:   potassium chloride ; Order Dt/Tm:   8/16/2019 9:27:40 AM  CDT          acetaminophen  :   acetaminophen ; Status:   Documented ; Ordered As Mnemonic:   acetaminophen 325 mg oral capsule ; Simple Display Line:   650 mg, 2 cap(s), po, q4 hrs, not to exceed 4000 mg/day, 0 Refill(s) ; Catalog Code:   acetaminophen ; Order Dt/Tm:   1/18/2018 10:13:21 AM CST

## 2022-02-15 NOTE — PROGRESS NOTES
Patient:   PUMA BAEZ            MRN: [id number]            FIN: [id number]               Age:   82 years     Sex:  Male     :  1935   Associated Diagnoses:   Pre-operative examination; Male circumcision   Author:   Jimi MG, Berhane CRISTOBAL      Preoperative Information   Procedure/ Case: circumcision   Location scheduled: [facility]   Date/ Time scheduled: 10/1/2018 8:00:00 AM   surgeon= Dr Sheriff      Chief Complaint   2018 10:55 AM CDT   Pt here for a Preop Px for a Circumcision by Dr Sheriff at [facility] DOS 10/1/2018   due to swelling around foreskin and glans      Review of Systems   Constitutional:  Negative.    Ear/Nose/Mouth/Throat:  Negative.    Respiratory:  Negative.    Cardiovascular:  Negative.    Gastrointestinal:  Negative.    Genitourinary:  Dysuria.       Health Status   Allergies:    Allergic Reactions (Selected)  Severity Not Documented  Narcotics (Severe constipation)  Vicodin (Urinary retention)   Medications:  (Selected)   Prescriptions  Prescribed  FLUoxetine 20 mg oral capsule: 1 cap(s) ( 20 mg ), po, daily, # 90 cap(s), 3 Refill(s), Type: Maintenance  aspirin 81 mg oral tablet: 2 tab(s) ( 162 mg ), po, daily, # 100 tab(s), 0 Refill(s), Type: Maintenance, OTC (Rx)  ferrous gluconate 324 mg (37.5 mg elemental iron) oral tablet: 1 tab(s) ( 324 mg ), po, daily, # 90 tab(s), 3 Refill(s), Type: Maintenance  finasteride 5 mg oral tablet: 1 tab(s) ( 5 mg ), PO, Daily, # 90 tab(s), 3 Refill(s), Type: Maintenance  furosemide 40 mg oral tablet: 1 tab(s) ( 40 mg ), PO, bid, # 60 tab(s), 1 Refill(s), Type: Maintenance  losartan 25 mg oral tablet: 1 tab(s) ( 25 mg ), PO, Daily, # 90 tab(s), 3 Refill(s), Type: Maintenance  metoprolol succinate 50 mg oral tablet, extended release: 1 tab(s) ( 50 mg ), PO, Daily, # 90 tab(s), 3 Refill(s), Type: Maintenance  Documented Medications  Documented  acetaminophen 325 mg oral capsule: 1 cap(s) ( 325 mg ), po, tid, 0 Refill(s), Type: Maintenance  amLODIPine 5  mg oral tablet: 1 tab(s) ( 5 mg ), PO, Daily, # 30 tab(s), 0 Refill(s), Type: Maintenance  atorvastatin 40 mg oral tablet: 1 tab(s) ( 40 mg ), Oral, daily, Instructions: Per Cardio note 8/8/2018, 0 Refill(s), Type: Maintenance  clotrimazole 1% topical cream: 1 annmarie, top, bid, 0 Refill(s), Type: Maintenance  Suspended  potassium chloride: ( 10 mEq ), daily, 0 Refill(s), Type: Maintenance   Problem list:    All Problems  Embolic cerebral infarction / SNOMED CT 7642332762 / Confirmed  Bilateral inguinal hernia / SNOMED CT 120059349 / Confirmed  Hematoma / SNOMED CT 5525321687 / Confirmed  Abnormal glucose / SNOMED CT 625508960 / Confirmed  Latent tuberculosis / SNOMED CT 95722619 / Confirmed  Anemia, blood loss / SNOMED CT 1559620515 / Confirmed  Hernia, ventral / SNOMED CT 8317269263 / Confirmed  Obesity / SNOMED CT 7872760221 / Probable  History of deep venous thrombosis / SNOMED CT 7695035354 / Confirmed  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 / SNOMED CT 4109872549 / Confirmed  S/P mitral valve clip implantation / SNOMED CT 9824830937 / Confirmed  S/P mitral valve clip implantation / SNOMED CT 3280730763 / Confirmed  Insomnia / SNOMED CT 849167716 / Confirmed  History of MI (myocardial infarction) / SNOMED CT 9736679110 / Confirmed  Nonrheumatic mitral valve regurgitation / SNOMED CT 320138131 / Confirmed  Esophageal reflux / SNOMED CT 657920610 / Confirmed  Lumbar spondylosis / SNOMED CT 875381844 / Confirmed  Epistaxis / SNOMED CT 665084815 / Confirmed  Hyperplasia of prostate / SNOMED CT 242665012 / Confirmed  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 94381612 / Confirmed  Urinary retention / SNOMED CT 002556484 / Confirmed  Acute posthemorrhagic anemia / SNOMED CT 699777371 / Confirmed  Left hemiparesis / SNOMED CT 302307106 / Confirmed  Pain of right thumb / SNOMED CT 188623284 / Confirmed  Barretts esophagus / SNOMED CT 612967500 / Confirmed  Chronic alcohol use / SNOMED CT 141263719 /  "Confirmed  Hematuria / SNOMED CT 280023869 / Confirmed  Foot pain / SNOMED CT 050873385 / Confirmed  CHF (congestive heart failure) / SNOMED CT 59744260 / Confirmed  Hypertensive heart disease with heart failure / SNOMED CT 34835347 / Confirmed  CAD (coronary artery disease) / SNOMED CT 40139265 / Confirmed  Hyperlipidemia / SNOMED CT 30545115 / Confirmed  Tuberculosis / SNOMED CT 25333948 / Confirmed  Inactive: CABG x 2 - Coronary artery bypass grafts x 2 / SNOMED CT 228417625  Resolved: History of fracture of right ankle / SNOMED CT 8309990586  Resolved: H/O fracture of wrist / SNOMED CT 2827258888  Resolved: Burst blood vessel in eye / SNOMED CT 231429846  Resolved: H/O herpes zoster / SNOMED CT 2409332280  Resolved: Inpatient stay / SNOMED CT 237146028  Resolved: Age-related cataract of both eyes / SNOMED CT 173530837  Resolved: Acute kidney injury / SNOMED CT 47635605      Histories   Past Medical History:    Active  Hematoma (0355028991): Onset on 12/15/2017 at 82 years.  Embolic cerebral infarction (2910025840): Onset on 12/10/2017 at 82 years.  CHF (congestive heart failure) (68019700): Onset in the month of 11/2008 at 73 years  History of MI (myocardial infarction) (4563789727): Onset in the month of 11/2007 at 72 years  Barretts esophagus (379448493): Onset in the month of 1/2003 at 67 years  Traumatic blindness of left eye (59105587): Onset in 1999 at 64 years.  Comments:  8/18/2010 CDT 2:58 PM CDT - Olivia Coates CMA  \"ruptured blood vessel\"  Hypertensive heart disease with heart failure (91830308): Onset in 1989 at 54 years.  Latent tuberculosis (34834472)  CAD (coronary artery disease) (69740448)  Acute posthemorrhagic anemia (007898749)  Hyperlipidemia (66887635)  Epistaxis (158717164)  Comments:  1/18/2018 CST 10:33 AM CST - Thuy Hopkins  Chronic  Abnormal glucose (050808947)  Foot pain (955280546)  Nonrheumatic mitral valve regurgitation (159476201)  Acute CVA (cerebrovascular accident) due to Hgb-S " disease (7772483056)  Hematuria (575212474)  History of deep venous thrombosis (5954535029)  Comments:  2018 CST 9:57 AM Thuy Topete  Right lower extremity.  Iliac vein.  Anemia, blood loss (0520148287)  Tuberculosis (44864123)  Comments:  2018 CST 10:02 AM Thuy Topete  Patient tests positive.  See family history.  Bilateral inguinal hernia (434433169)  Hernia, ventral (2760273219)  Hyperplasia of prostate (147028241)  Comments:  2018 CST 10:24 AM Thuy Topete  Benign  Insomnia (193428022)  Pain of right thumb (194870148)  Comments:  2018 CST 10:36 AM Thuy Topete  Chronic  Chronic alcohol use (517568578)  Esophageal reflux (548694262)  Lumbar spondylosis (901071155)  Resolved  Inpatient stay (935040322): Onset on 2017 at 82 years.  Resolved on 2018 at 82 years.  Comments:  2018 CST 1:09 PM Blossom Art  @[facility], [place], [place] - Embolic cerebral infarction    2018 CST 1:14 PM Blossom Art  [facility]  H/O herpes zoster (3396780375): Onset on 2009 at 73 years.  Resolved.  Age-related cataract of both eyes (591272348):  Resolved.  Acute kidney injury (48268906):  Resolved.  History of fracture of right ankle (0086785069):  Resolved.  Comments:  2018 CST 10:06 AM Thuy Topete  Right  Burst blood vessel in eye (353727825):  Resolved.  Comments:  2018 CST 10:05 AM Thuy Topete  Left  H/O fracture of wrist (8044200519):  Resolved.  Comments:  2018 CST 10:08 AM Thuy Topete  Right   Family History:    Cancer  Brother ()  Comments:  2010 3:29 PM - Jaxon OLEARY, Olivia  Lung  Brother  Comments:  2010 3:29 PM - Jaxon OLEARY, Olivia  Oral  Brother ()  Comments:  2010 3:29 PM - Jaxon OLEARY, Olivia  colon  Brother ()  Leukemia  Sister (Adrianna, )  Lymphoma  Sister  Rheumatoid arthritis  Brother  TB - Tuberculosis  Father  ()  Mother ()  HTN - Hypertension  Brother ()  Sister (Adrianna, )  Sister  Brother ()     Procedure history:    History of inferior vena cava filter placement (7599533341) on 12/10/2017 at 82 Years.  Mitral valve clip (1732489134) on 2017 at 82 Years.  Comments:  2018 1:16 PM - Blossom Varela  Complicated by air embolization and intermedius vein graft  PCI - Percutaneous coronary intervention (4632769223) on 2017 at 82 Years.  Comments:  2018 1:18 PM - Blossom Varela  via left femoral artery with coronary angiogram.  Intraaortic balloon pump placed and later removed.  Coronary angiogram (48259245) on 2017 at 81 Years.  Extracapsular cataract extraction and insertion of intraocular lens (854368303) on 2017 at 81 Years.  Comments:  2017 12:37 PM - Dalila Rondon  Right.  Extracapsular cataract extraction and insertion of intraocular lens (107776931) on 2017 at 81 Years.  Comments:  2017 1:44 PM - Dalila Rondon  Left.  Coronary angiogram (26412790) on 3/6/2017 at 81 Years.  Repair of recurrent ventral hernia w/mesh (783832208) on 2012 at 76 Years.  Repair of epigastric hernia (543784097) on 3/15/2012 at 76 Years.  right shoulder rotator cuff repair in the month of 2010 at 74 Years.  Comments:  2010 3:06 PM - RENARD GORMAN  R  Colonoscopy (886191115) in the month of 3/2009 at 73 Years.  Comments:  2010 3:02 PM - RENARD GORMAN  Normal  CABG x 2 - Coronary artery bypass grafts x 2 (329424195) in the month of 2007 at 72 Years.  laser for photocoagulation in the month of 3/2007 at 71 Years.  EGD in the month of 2003 at 67 Years.  Flexible sigmoidoscopy (43421481) on 10/12/2000 at 65 Years.  Flexible sigmoidoscopy (44540938) on 10/19/1993 at 58 Years.  right shoulder decompression in  at 54 Years.  Wrist Fracture in  at 52 Years.  Comments:  2010 3:07 PM - RENARD GORMAN  Compression fracutre of foot in 1978  at 43 Years.  L ankle compression in 1975 at 40 Years.  Bilateral vasectomy (488682095) in 1975 at 40 Years.  Back (201466062) in 1975 at 40 Years.  Comments:  1/18/2018 10:26 AM - Thuy Hopkins  Lumbar and thoracic.    1/18/2018 10:23 AM - Thuy Hopkins  History of spine surgery.  Rotator cuff repair (474405457) in 1964 at 29 Years.  Comments:  2/24/2012 10:25 AM - Berhane Krause PA-C.  left shoulder  Hernia repair (65284253).  Comments:  8/18/2010 3:05 PM - RENARD GORMAN  Bilateral, Laparoscopy  vitreoretinal surgery.  ORIF - Open reduction of fracture of ankle with internal fixation (593316887993096).  Comments:  1/18/2018 10:11 AM - Thuy Hopkins  Right   Social History:        Alcohol Assessment: Current            Current                     Comments:                      08/18/2010 - Renard Coates CMA                     rare      Tobacco Assessment: Denies Tobacco Use      Substance Abuse Assessment: Denies Substance Abuse      Home and Environment Assessment            Marital status: .       Has a  history of anemia.  Has no history of DVT or pulmonary embolism.  Has a personal history of bleeding problems.   Has  no personal history of anesthesia reactions.  Has no  personal history of sleep apnea  Patient  does not have active tuberculosis.    S/he has  taken aspirin or aspirin-containing products in the last week.     S/he  has not taken Plavix (Clopidogrel) in the last 2 weeks.    S/he  has not taken warfarin in the past week.    S/he  has not been on corticosteroids for more than 2 weeks recently.   S/he is DNR before, during or after surgery.          Chest pain / SOB walking up 2 flights of steps? No  Pain in neck or jaw? No  CAD MI?  Yes  Afib? No  Heart Failure? Yes  Asthma  or Bronchitis? No  Diabetes? No       Insulin/Orals?   Seizure Disorder? No  CKD? No  Thyroid Disease? No  Liver Disease? No  CVA? Yes      Physical Examination   Vital Signs   9/18/2018 10:55 AM CDT Temperature Tympanic  97.7 DegF  LOW    Peripheral Pulse Rate 60 bpm    Pulse Site Radial artery    Respiratory Rate 20 br/min    Systolic Blood Pressure 186 mmHg  HI    Diastolic Blood Pressure 100 mmHg  HI    Mean Arterial Pressure 129 mmHg    BP Site Right arm    Oxygen Saturation 98 %    Vital Signs Comments Pulse Irregular      Measurements from flowsheet : Measurements   9/18/2018 10:55 AM CDT Height Measured - Standard 68 in    Weight Measured - Standard 191 lb    BSA 2.04 m2    Body Mass Index 29.04 kg/m2  HI      Documented vital signs:       Blood Pressure: Systolic  176  mmHg, Diastolic  90  mmHg.    General:  No acute distress.    Eye:  Pupils are equal, round and reactive to light, Extraocular movements are intact.    HENT:  Tympanic membranes are clear, No pharyngeal erythema, No sinus tenderness.    Neck:  Supple, Non-tender, No lymphadenopathy.    Respiratory:  Lungs are clear to auscultation.    Cardiovascular:  Normal rate, Regular rhythm, grade 6/6 holostysolic murmur, slight peripheral edema.    Gastrointestinal:  Soft, Non-tender, Non-distended, Normal bowel sounds, No organomegaly.    Psychiatric:  Appropriate mood & affect.       Review / Management   Results review:  Lab results   8/28/2018 8:54 AM CDT Sodium Level 141 mmol/L    Potassium Level 3.7 mmol/L    Chloride Level 103 mmol/L    CO2 Level 29 mmol/L    AGAP 9    Glucose Level 94 mg/dL    BUN 20 mg/dL    Creatinine Level 1.20 mg/dL    BUN/Creat Ratio 17    eGFR  >60    eGFR Non-African American 58    Calcium Level 9.4 mg/dL    WBC 5.7    RBC 4.40    Hgb 14.1 g/dL    Hct 40.8 %    MCV 93 fL    MCH 32.0 pg    MCHC 34.6 g/dL    RDW 13.1 %    Platelet 145    MPV 9.8 fL    Lymphocytes 23.9 %    Abs Lymphocytes 1.4    Neutrophils 54.8 %    Abs Neutrophils 3.2    Monocytes 15.7 %    Abs Monocytes 0.9    Eosinophils 5.1 %    Abs Eosinophils 0.3    Basophils 0.5 %    Abs Basophils 0.0   .    Radiology results   Results reviewed with patient.  Chest  xray shows no effusions or consolidations, will be over-read by radiologist.  Will call if over-read has additional information   ECG interpretation:  EKG from 5/11/2018 is stable, no acute changes.       Impression and Plan   Diagnosis     Pre-operative examination (AZP09-RE Z01.818).     Male circumcision (IEN51-ES Z41.2).     Orders     Orders   Requests (Radiology):  XR Chest PA/Lat (Request) (Order): Pre-operative examination  Male circumcision.     Orders   Charges (Evaluation and Management):  [id number] office outpatient visit 25 minutes (Charge) (Order): Quantity: 1, Pre-operative examination  Male circumcision.     Approved for surgery without restrictions.

## 2022-02-15 NOTE — NURSING NOTE
Pt appears on __DWG_ chronic disease panel as out of parameters for _IVD/HTN_  Pt was seen on 9/5/2017, no RTC placed at that time.  Placed RTC today for CSS BP check in 2 weeks,

## 2022-02-15 NOTE — NURSING NOTE
Anticoagulation Therapy Management Entered On:  6/25/2020 3:54 PM CDT    Performed On:  6/25/2020 3:52 PM CDT by Darlene Thomas RN               Anticoagulation Visit Assessment   Type of Visit - Anticoagulation :   Telephone   Anticoagulation Indication :   Atrial fibrillation, DVT prophylaxis   Anticoagulant Duration :   Undetermined   Anticoagulation Medication Verified :   Yes   Patient on Warfarin :   Yes   Darlene Thomas RN - 6/25/2020 3:52 PM CDT   Warfarin   International Normalization Ratio TR :   2.6    Anticoagulant INR Goal Lower :   2    Anticoagulant INR Goal Upper :   3    Sunday :   2.5 mg   Monday :   2.5 mg   Tuesday :   1.25 mg   Wednesday :   2.5 mg   Thursday :   0 mg   Friday :   1.25 mg   Saturday :   2.5 mg   Total Dose :   12.5 mg   Warfarin Pt Reported Previous Week Dose :    Sun Mon Tues Wed Thurs Fri Sat Weekly Total Dose   Week 1                   Week 2                   Week 3                   Week 4                         Patient is taking single or multiple strength tablet(s) :   Single strength tab(s)   One Tab Strength :   2.5 mg tab   Sunday :   2.5 mg   Monday :   2.5 mg   Tuesday :   1.25 mg   Wednesday :   2.5 mg   Thursday :   2.5 mg   Friday :   1.25 mg   Saturday :   2.5 mg   Week 1 Total Dose :   15 mg   Sunday :   1 tab(s)   Monday :   1 tab(s)   Tuesday :   0.5 tab(s)   Wednesday :   1 tab(s)   Thursday :   1 tab(s)   Friday :   0.5 tab(s)   Saturday :   1 tab(s)   Darlene Thomas RN - 6/25/2020 3:52 PM CDT   Patient Instructions :   INR = 2.6 per [place] [facility]. Per protocol, continue Warfarin 1.25mg Tues/Fri and 2.5mg ROW. Recheck INR in 2 weeks. Advised RN by phone and orders faxed.      Darlene Thomas RN - 6/25/2020 3:55 PM CDT

## 2022-02-15 NOTE — PROGRESS NOTES
"   Patient:   PUMA BAEZ            MRN: [id number]            FIN: [id number]               Age:   83 years     Sex:  Male     :  1935   Associated Diagnoses:   Bursitis of right knee; CHF (congestive heart failure); Chronic mitral valve regurgitation; Hematoma   Author:   Berhane Krause PA-C      Visit Information   Accompanied by:  daughter.       Chief Complaint   2019 9:05 AM CDT    Pt here for Post Hospital visit for CHF exacerbation and FU on fall/head injury from 19.        History of Present Illness   2019: Chief complaint as above reviewed and confirmed with patient. Pt presents to the clinic with concerns re: knee pain on the R. He is accompanied by his daughter.   Pt was recently discharged from [facility] with afib, acute on chronic CHF. Had recent DVT as well. HE was transitioned to Coumadin and Lovenox at discharge per patient preference due to cost. He was to return over the weekend for INR but did not come in, he comes in today however for both INR and concerns re: R knee pain which started after fall from ground level at home. He states he \"passed out\" from drinking too much ETOH. Had about 3 or so shots at home after his discharge friday night. Got up to use the bathroom and fell to the ground. He woke up the next morning. His Daughter was there ( a different one than with him today) and pt states he was \"taking non-sense\" but not unconscious. He believes he hit his knee during the fall. Has pain that is mild but able to WB and ambulate well. Has noted swelling. Also noted to have area of bruising and swelling of the R periorbital area. Pt denies any HA, confusion, disorientation, nausea/vomiting, vision changes, hearing changes difficulty with balance.   Daughter does not note any cognitive changes.  2019: here today for post hospital visit for CHF exacerbation and F/U on fall/head injury  head CT on  was negative  his head is feeling fine  INR today is 3.5, lovenox " bridge was stopped last time, will continue on warfarin 5 mg/2.5 mg alternating  recheck INR on 9/16  has DC on 9/13, appt with Dr Yeison Lawrence on 10/2, appt with Dr Navarrete on 10/25 for consultation for another attempt at [facility]      Review of Systems   Constitutional:  Fatigue.    Respiratory:  No shortness of breath.    Cardiovascular:  No chest pain.       Health Status   Allergies:    Allergic Reactions (All)  Severity Not Documented  Narcotics (Severe constipation)  Vicodin (Urinary retention)  Canceled/Inactive Reactions (All)  No known allergies   Medications:  (Selected)   Prescriptions  Prescribed  FLUoxetine 20 mg oral capsule: = 2 cap(s) ( 40 mg ), po, daily, # 90 cap(s), 3 Refill(s), Type: Maintenance, Pharmacy: [id number]  aspirin 81 mg oral tablet: 2 tab(s) ( 162 mg ), po, daily, # 100 tab(s), 0 Refill(s), Type: Maintenance, OTC (Rx)  ferrous gluconate 324 mg (37.5 mg elemental iron) oral tablet: 1 tab(s) ( 324 mg ), po, daily, # 90 tab(s), 3 Refill(s), Type: Maintenance  finasteride 5 mg oral tablet: = 1 tab(s) ( 5 mg ), PO, Daily, # 90 tab(s), 3 Refill(s), Type: Maintenance, Pharmacy: [facility] #[id number], 1 tab(s) Oral daily  metoprolol succinate 50 mg oral tablet, extended release: = 1 tab(s) ( 50 mg ), PO, Daily, # 90 tab(s), 3 Refill(s), Type: Maintenance, Pharmacy: [id number], 1 tab(s) Oral daily  tamsulosin 0.4 mg oral capsule: = 1 cap(s) ( 0.4 mg ), po, daily, # 90 cap(s), 3 Refill(s), Type: Maintenance, Pharmacy: [id number], 1 cap(s) Oral daily  Documented Medications  Documented  acetaminophen 325 mg oral capsule: 1 cap(s) ( 325 mg ), po, tid, 0 Refill(s), Type: Maintenance  amLODIPine 5 mg oral tablet: 1 tab(s) ( 5 mg ), PO, Daily, # 30 tab(s), 0 Refill(s), Type: Maintenance  atorvastatin 40 mg oral tablet: 1 tab(s) ( 40 mg ), Oral, daily, Instructions: Per Cardio note 8/8/2018, 0 Refill(s), Type: Maintenance  doxylamine: ( 25 mg ), Oral, daily, 0  Refill(s), Type: Maintenance  furosemide 40 mg oral tablet: See Instructions, Instructions: Take 80mg in the AM and 80mg in the afternoon  Per cardio from 7/3/19, 0 Refill(s), Type: Maintenance  losartan 50 mg oral tablet: = 1 tab(s) ( 50 mg ), Oral, daily, # 30 tab(s), 0 Refill(s), Type: Maintenance  potassium chloride 20 mEq oral tablet, extended release: = 1 tab(s) ( 20 mEq ), Oral, bid, # 60 tab(s), 0 Refill(s), Type: Maintenance  warfarin 5 mg oral tablet: = 1 tab(s) ( 5 mg ), Oral, daily, # 30 tab(s), 0 Refill(s), Type: Maintenance   Problem list:    All Problems (Selected)  Embolic cerebral infarction / SNOMED CT 1519442724 / Confirmed  Bilateral inguinal hernia / SNOMED CT 684476146 / Confirmed  Hematoma / SNOMED CT 0742984229 / Confirmed  Abnormal glucose / SNOMED CT 631785012 / Confirmed  Anticoagulated on warfarin / SNOMED CT 36039000 / Confirmed  Latent tuberculosis / SNOMED CT 77130943 / Confirmed  Anemia, blood loss / SNOMED CT 1538014005 / Confirmed  Hernia, ventral / SNOMED CT 3569961197 / Confirmed  Obesity / SNOMED CT 9827605635 / Probable  History of deep venous thrombosis / SNOMED CT 2055367206 / Confirmed  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 / SNOMED CT 6884278321 / Confirmed  S/P mitral valve clip implantation / SNOMED CT 6114913063 / Confirmed  S/P mitral valve clip implantation / SNOMED CT 6865195933 / Confirmed  Insomnia / SNOMED CT 219108833 / Confirmed  History of MI (myocardial infarction) / SNOMED CT 2218192731 / Confirmed  Nonrheumatic mitral valve regurgitation / SNOMED CT 745550475 / Confirmed  Esophageal reflux / SNOMED CT 904713959 / Confirmed  Lumbar spondylosis / SNOMED CT 562949125 / Confirmed  Epistaxis / SNOMED CT 966581876 / Confirmed  Hyperplasia of prostate / SNOMED CT 539797974 / Confirmed  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 67115240 / Confirmed  Urinary retention / SNOMED CT 183586636 / Confirmed  Acute posthemorrhagic anemia / SNOMED CT  "536042904 / Confirmed  Left hemiparesis / SNOMED CT 547788795 / Confirmed  Paroxysmal atrial fibrillation / SNOMED CT 223244129 / Confirmed  Pain of right thumb / SNOMED CT 224405781 / Confirmed  Barretts esophagus / SNOMED CT 000919422 / Confirmed  Chronic alcohol use / SNOMED CT 838955570 / Confirmed  Hematuria / SNOMED CT 330234213 / Confirmed  Foot pain / SNOMED CT 952923604 / Confirmed  Depression / SNOMED CT 19091436 / Confirmed  CHF (congestive heart failure) / SNOMED CT 20064913 / Confirmed  Hypertensive heart disease with heart failure / SNOMED CT 36807812 / Confirmed  Chronic mitral valve regurgitation / SNOMED CT 00454725 / Confirmed  CAD (coronary artery disease) / SNOMED CT 85898075 / Confirmed  Hyperlipidemia / SNOMED CT 73493684 / Confirmed  Tuberculosis / SNOMED CT 24778953 / Confirmed      Histories   Past Medical History:    Active  Hematoma (2117776239): Onset on 12/15/2017 at 82 years.  Embolic cerebral infarction (0122134715): Onset on 12/10/2017 at 82 years.  CHF (congestive heart failure) (13887721): Onset in the month of 11/2008 at 73 years  History of MI (myocardial infarction) (0127383593): Onset in the month of 11/2007 at 72 years  Barretts esophagus (335588259): Onset in the month of 1/2003 at 67 years  Traumatic blindness of left eye (39644459): Onset in 1999 at 64 years.  Comments:  8/18/2010 CDT 2:58 PM CDT - Olivia Coates CMA  \"ruptured blood vessel\"  Hypertensive heart disease with heart failure (68890417): Onset in 1989 at 54 years.  Latent tuberculosis (39760713)  CAD (coronary artery disease) (67021532)  Acute posthemorrhagic anemia (319914168)  Hyperlipidemia (17429314)  Epistaxis (599142090)  Comments:  1/18/2018 CST 10:33 AM CST - Thuy Hopkins  Chronic  Abnormal glucose (894435920)  Foot pain (297597230)  Nonrheumatic mitral valve regurgitation (148037895)  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 (1167317587)  Hematuria (481609862)  History of deep venous " thrombosis (7529554313)  Comments:  1/18/2018 CST 9:57 AM Thuy Topete  Right lower extremity.  Iliac vein.    7/29/2019 CDT 9:53 AM Blossom Aly  07/25/2019 - right leg DVT  Anemia, blood loss (6246170130)  Tuberculosis (56518230)  Comments:  1/18/2018 CST 10:02 AM Thuy Topete  Patient tests positive.  See family history.  Bilateral inguinal hernia (987545499)  Hernia, ventral (5635072132)  Hyperplasia of prostate (426098655)  Comments:  1/18/2018 CST 10:24 AM Thuy Topete  Benign  Insomnia (091953312)  Pain of right thumb (047785711)  Comments:  1/18/2018 CST 10:36 AM Thuy Topete  Chronic  Chronic alcohol use (823504870)  Esophageal reflux (333732888)  Lumbar spondylosis (134002488)  Paroxysmal atrial fibrillation (907753932)  Depression (91570478)  Chronic mitral valve regurgitation (39928722)  Resolved  Inpatient stay (506764777): Onset on 9/4/2019 at 83 years.  Resolved on 9/5/2019 at 83 years.  Comments:  9/9/2019 CDT 10:15 AM Blossom Aly  @[place], [place] - Acute on chronic systolic congestive heart failure  Inpatient stay (140294895): Onset on 7/22/2019 at 83 years.  Resolved on 7/25/2019 at 83 years.  Comments:  7/29/2019 CDT 9:47 AM Blossom Aly  @[place], [place] - Acute on chronic systolic and diastolic CHF exacerbation secondary to severe mitral regurgitation  Inpatient stay (301937017): Onset on 12/21/2017 at 82 years.  Resolved on 1/18/2018 at 82 years.  Comments:  1/31/2018 CST 1:09 PM Blossom Art  @[place], [place] - Embolic cerebral infarction    1/31/2018 CST 1:14 PM [facility]  H/O herpes zoster (2641676064): Onset on 6/30/2009 at 73 years.  Resolved.  Age-related cataract of both eyes (423704066):  Resolved.  Acute kidney injury (04939266):  Resolved.  History of fracture of right ankle (8207899044):  Resolved.  Comments:  1/18/2018 CST  10:06 AM TRISTIN Thuy Camarena  Right  Burst blood vessel in eye (694909655):  Resolved.  Comments:  2018 CST 10:05 AM Thuy Topete  Left  H/O fracture of wrist (7293649027):  Resolved.  Comments:  2018 CST 10:08 AM Thuy Topete  Right   Family History:    Cancer  Brother ()  Comments:  2010 3:29 PM CDT - Jaxon MAMTA, Olivia  Lung  Brother  Comments:  2010 3:29 PM CDT - Jaxon Olivia OLEARY  Oral  Brother ()  Comments:  2010 3:29 PM CDT - Jaxon Olivia OLEARY  colon  Brother ()  Leukemia  Sister (Adrianna, )  Lymphoma  Sister  Rheumatoid arthritis  Brother  TB - Tuberculosis  Father ()  Mother ()  HTN - Hypertension  Brother ()  Sister (Adrianna, )  Sister  Brother ()     Procedure history:    History of inferior vena cava filter placement (7639058203) on 12/10/2017 at 82 Years.  Mitral valve clip (8925647898) on 2017 at 82 Years.  Comments:  2018 1:16 PM TRISTIN Blossom Wilkins  Complicated by air embolization and intermedius vein graft  PCI - Percutaneous coronary intervention (2650158852) on 2017 at 82 Years.  Comments:  2018 1:18 PM TRISTIN Blossom Wilkins  via left femoral artery with coronary angiogram.  Intraaortic balloon pump placed and later removed.  Coronary angiogram (14028439) on 2017 at 81 Years.  Extracapsular cataract extraction and insertion of intraocular lens (649689033) on 2017 at 81 Years.  Comments:  2017 12:37 PM Dalila Estrada  Right.  Extracapsular cataract extraction and insertion of intraocular lens (229821727) on 2017 at 81 Years.  Comments:  2017 1:44 PM Dalila Estrada  Left.  Coronary angiogram (54830748) on 3/6/2017 at 81 Years.  Repair of recurrent ventral hernia w/mesh (804326160) on 2012 at 76 Years.  Repair of epigastric hernia (473331577) on 3/15/2012 at 76 Years.  right shoulder rotator cuff repair in the month of 2010 at 74  Years.  Comments:  8/18/2010 3:06 PM RENARD BEVERLY  Colonoscopy (410242416) in the month of 3/2009 at 73 Years.  Comments:  8/18/2010 3:02 PM RENARD BEVERLY  Normal  CABG x 2 - Coronary artery bypass grafts x 2 (294592435) in the month of 11/2007 at 72 Years.  laser for photocoagulation in the month of 3/2007 at 71 Years.  EGD in the month of 1/2003 at 67 Years.  Flexible sigmoidoscopy (31804593) on 10/12/2000 at 65 Years.  Flexible sigmoidoscopy (73121134) on 10/19/1993 at 58 Years.  right shoulder decompression in 1989 at 54 Years.  Wrist Fracture in 1987 at 52 Years.  Comments:  8/18/2010 3:07 PM RENARD BEVERLY  Compression fracutre of foot in 1978 at 43 Years.  L ankle compression in 1975 at 40 Years.  Bilateral vasectomy (923765225) in 1975 at 40 Years.  Back (596029665) in 1975 at 40 Years.  Comments:  1/18/2018 10:26 AM Thuy Topete  Lumbar and thoracic.    1/18/2018 10:23 AM Thuy Topete  History of spine surgery.  Rotator cuff repair (178329428) in 1964 at 29 Years.  Comments:  2/24/2012 10:25 AM Berhane Waggoner PA-C.  left shoulder  Hernia repair (13233145).  Comments:  8/18/2010 3:05 PM RENARD BEVERLY  Bilateral, Laparoscopy  vitreoretinal surgery.  ORIF - Open reduction of fracture of ankle with internal fixation (093506314804368).  Comments:  1/18/2018 10:11 AM Thuy Topete  Right   Social History:        Alcohol Assessment: Current            Current                     Comments:                      08/18/2010 - Renard Coates CMA      Tobacco Assessment: Denies Tobacco Use      Substance Abuse Assessment: Denies Substance Abuse      Home and Environment Assessment            Marital status: .        Physical Examination   Vital Signs   9/11/2019 9:05 AM CDT Temperature Tympanic 98.8 DegF    Peripheral Pulse Rate 102 bpm  HI    Pulse Site Radial artery    Respiratory Rate 20 br/min    Systolic Blood Pressure 136 mmHg  HI     Diastolic Blood Pressure 80 mmHg    Mean Arterial Pressure 99 mmHg    BP Site Right arm    Oxygen Saturation 98 %    Vital Signs Comments Pulse Irregular      Measurements from flowsheet : Measurements   9/11/2019 9:05 AM CDT Height Measured - Standard 68 in    Weight Measured - Standard 195 lb    BSA 2.06 m2    Body Mass Index 29.65 kg/m2  HI      General:  No acute distress.    HENT:  hematoma over right forehead.    Respiratory:  Lungs are clear to auscultation.    Cardiovascular:  Irregularly irregular rhythm, grade 4/6 holosystolic murmur.    Musculoskeletal:  right knee:  swollen with minimal brusing, +1 edema  in right ankle.    Psychiatric:  Appropriate mood & affect.       Review / Management   Documentation reviewed:  Records from referring facility.       Impression and Plan   Diagnosis     Bursitis of right knee (OJB36-RV M70.51).     CHF (congestive heart failure) (QYX18-TH I50.9).     Chronic mitral valve regurgitation (XHQ92-KV I34.0).     Hematoma (MWT93-VT T14.8XXA).     Summary:  he is stable today, has stopped the lovenox, will go with warfarin 5 mg/2.5 mg alternating, recheck INR on 9/16  has DC scheduled on 9/13, appt with Dr Lawrence on 10/2, appt with Dr Navarrete on 10/25    limit alcohol and salt intake,  monitor weight, call if weight increases by more than 2 lbs.    Orders     Orders   Charges (Evaluation and Management):  [id number] office outpatient visit 25 minutes (Charge) (Order): Quantity: 1, CHF (congestive heart failure)  Chronic mitral valve regurgitation  Hematoma  Bursitis of right knee.

## 2022-02-15 NOTE — NURSING NOTE
Comprehensive Intake Entered On:  3/19/2019 10:06 AM CDT    Performed On:  3/19/2019 9:57 AM CDT by Vishnu Funes CMA               Summary   Chief Complaint :   Pt here for ER FU for left pubic rami Fx from 3/11/19.   Height Measured :   68 in(Converted to: 5 ft 8 in, 172.72 cm)    Systolic Blood Pressure :   152 mmHg (HI)    Diastolic Blood Pressure :   70 mmHg   Mean Arterial Pressure :   97 mmHg   Peripheral Pulse Rate :   72 bpm   Vital Signs Comments :   unable to get weight today. Pulse Irregular   BP Site :   Right arm   Pulse Site :   Radial artery   Temperature Tympanic :   97.3 DegF(Converted to: 36.3 DegC)  (LOW)    Respiratory Rate :   22 br/min (HI)    Oxygen Saturation :   98 %   Vishnu Funes CMA - 3/19/2019 9:57 AM CDT   Health Status   Allergies Verified? :   Yes   Medication History Verified? :   Yes   Medical History Verified? :   Yes   Pre-Visit Planning Status :   Completed   Tobacco Use? :   Never smoker   Vishnu Funes CMA - 3/19/2019 9:57 AM CDT   Meds / Allergies   (As Of: 3/19/2019 10:06:10 AM CDT)   Allergies (Active)   Narcotics  Estimated Onset Date:   Unspecified ; Reactions:   severe constipation ; Created By:   Schoen RN, Alissa; Reaction Status:   Active ; Category:   Drug ; Substance:   Narcotics ; Type:   Allergy ; Updated By:   Schoen RN, Alissa; Reviewed Date:   3/19/2019 10:01 AM CDT      Vicodin  Estimated Onset Date:   <not entered> 2011 ; Reactions:   Urinary retention ; Created By:   Berhane Krause PA-C; Reaction Status:   Active ; Category:   Drug ; Substance:   Vicodin ; Type:   Allergy ; Updated By:   Berhane Krause PA-C; Reviewed Date:   3/19/2019 10:01 AM CDT        Medication List   (As Of: 3/19/2019 10:06:10 AM CDT)   Prescription/Discharge Order    tamsulosin  :   tamsulosin ; Status:   Prescribed ; Ordered As Mnemonic:   tamsulosin 0.4 mg oral capsule ; Simple Display Line:   0.4 mg, 1 cap(s), po, daily, 90 cap(s), 3 Refill(s) ; Ordering Provider:   Jimi  Berhane MG; Catalog Code:   tamsulosin ; Order Dt/Tm:   1/15/2019 10:52:54 AM          finasteride  :   finasteride ; Status:   Prescribed ; Ordered As Mnemonic:   finasteride 5 mg oral tablet ; Simple Display Line:   5 mg, 1 tab(s), PO, Daily, 90 tab(s), 3 Refill(s) ; Ordering Provider:   Berhane Krause PA-C; Catalog Code:   finasteride ; Order Dt/Tm:   1/15/2019 10:49:10 AM          FLUoxetine  :   FLUoxetine ; Status:   Prescribed ; Ordered As Mnemonic:   FLUoxetine 20 mg oral capsule ; Simple Display Line:   40 mg, 2 cap(s), po, daily, 90 cap(s), 3 Refill(s) ; Ordering Provider:   Dr Danika Esposito; Catalog Code:   FLUoxetine ; Order Dt/Tm:   1/15/2019 10:48:58 AM          metoprolol  :   metoprolol ; Status:   Prescribed ; Ordered As Mnemonic:   metoprolol succinate 50 mg oral tablet, extended release ; Simple Display Line:   50 mg, 1 tab(s), PO, Daily, 90 tab(s), 3 Refill(s) ; Ordering Provider:   Berhane Krause PA-C; Catalog Code:   metoprolol ; Order Dt/Tm:   1/15/2019 10:48:42 AM          losartan  :   losartan ; Status:   Prescribed ; Ordered As Mnemonic:   losartan 25 mg oral tablet ; Simple Display Line:   25 mg, 1 tab(s), PO, Daily, 90 tab(s), 3 Refill(s) ; Ordering Provider:   Berhane Krause PA-C; Catalog Code:   losartan ; Order Dt/Tm:   3/19/2018 8:12:48 AM          ferrous gluconate  :   ferrous gluconate ; Status:   Prescribed ; Ordered As Mnemonic:   ferrous gluconate 324 mg (37.5 mg elemental iron) oral tablet ; Simple Display Line:   324 mg, 1 tab(s), po, daily, 90 tab(s), 3 Refill(s) ; Ordering Provider:   Berhane Krause PA-C; Catalog Code:   ferrous gluconate ; Order Dt/Tm:   3/19/2018 8:09:39 AM          furosemide  :   furosemide ; Status:   Prescribed ; Ordered As Mnemonic:   furosemide 40 mg oral tablet ; Simple Display Line:   See Instructions, 1.5 tabs(60 mg) PO daily., 135 tab(s), 1 Refill(s) ; Ordering Provider:   Jimi MG, Berhane CRISTOBAL; Catalog Code:   furosemide ; Order  Dt/Tm:   3/19/2018 8:07:38 AM          aspirin  :   aspirin ; Status:   Prescribed ; Ordered As Mnemonic:   aspirin 81 mg oral tablet ; Simple Display Line:   162 mg, 2 tab(s), po, daily, 100 tab(s), 0 Refill(s) ; Ordering Provider:   Berhane Krause PA-C; Catalog Code:   aspirin ; Order Dt/Tm:   9/2/2011 3:37:00 PM            Home Meds    acetaminophen-oxycodone  :   acetaminophen-oxycodone ; Status:   Completed ; Ordered As Mnemonic:   acetaminophen-oxycodone 325 mg-5 mg oral tablet ; Simple Display Line:   1 tab(s), Oral, q6 hrs, PRN: as needed for pain, 15 tab(s), 0 Refill(s) ; Catalog Code:   acetaminophen-oxycodone ; Order Dt/Tm:   3/18/2019 1:18:54 PM          potassium chloride  :   potassium chloride ; Status:   Suspended ; Ordered As Mnemonic:   potassium chloride ; Simple Display Line:   10 mEq, daily ; Catalog Code:   potassium chloride ; Order Dt/Tm:   8/18/2010 2:52:13 PM ; Comment:   every other day          acetaminophen-oxycodone  :   acetaminophen-oxycodone ; Status:   Documented ; Ordered As Mnemonic:   acetaminophen-oxycodone 325 mg-5 mg oral tablet ; Simple Display Line:   1 tab(s), Oral, q6 hrs, 15, 0 Refill(s) ; Catalog Code:   acetaminophen-oxycodone ; Order Dt/Tm:   3/12/2019 2:23:22 PM          atorvastatin  :   atorvastatin ; Status:   Documented ; Ordered As Mnemonic:   atorvastatin 40 mg oral tablet ; Simple Display Line:   40 mg, 1 tab(s), Oral, daily, Per Cardio note 8/8/2018, 0 Refill(s) ; Catalog Code:   atorvastatin ; Order Dt/Tm:   8/24/2018 2:27:27 PM          amLODIPine  :   amLODIPine ; Status:   Documented ; Ordered As Mnemonic:   amLODIPine 5 mg oral tablet ; Simple Display Line:   5 mg, 1 tab(s), PO, Daily, 30 tab(s), 0 Refill(s) ; Catalog Code:   amLODIPine ; Order Dt/Tm:   5/18/2018 9:02:55 AM          acetaminophen  :   acetaminophen ; Status:   Documented ; Ordered As Mnemonic:   acetaminophen 325 mg oral capsule ; Simple Display Line:   325 mg, 1 cap(s), po, tid, 0  Refill(s) ; Catalog Code:   acetaminophen ; Order Dt/Tm:   1/18/2018 10:13:21 AM            Social History   Social History   (As Of: 3/19/2019 10:06:10 AM CDT)   Alcohol:  Current      Current   Comments:  8/18/2010 2:59 PM - Olivia Coates CMA: rare   (Last Updated: 8/18/2010 2:59:42 PM CDT by Olivia Coates CMA)          Tobacco:  Denies Tobacco Use      (Last Updated: 8/18/2010 2:59:53 PM CDT by Olivia Coates CMA )         Substance Abuse:  Denies Substance Abuse      (Last Updated: 3/14/2012 3:14:16 PM CDT by Adrian Argueta MD )         Home and Environment:        Marital status: .   (Last Updated: 9/5/2017 2:18:47 PM CDT by Marsha PALMER, Claudia)

## 2022-02-15 NOTE — PROGRESS NOTES
Patient:   PUMA BAEZ            MRN: [id number]            FIN: [id number]               Age:   81 years     Sex:  Male     :  1935   Associated Diagnoses:   None   Author:   Axel Weller MD      Visit Information      Date of Service: 2017 09:55 am  Performing Location: [facility]  Encounter#: [id number]      Primary Care Provider (PCP):  Berhane Krause PA-C    NPI# [id number]      Referring Provider:  No referring provider recorded for selected visit.      Chief Complaint   3/3/2017 10:03 AM CST    Chief Complaint     Pt here for pre-op for mitral valve surgery      History of Present Illness   Pt states he is having an angioplasty on 3/6 and then mitral valve repair at an as of yet undetermined time after that. He states he has already had a pre-operative workup through his cardiologist, including stress test and bloodwork. He has a history of mitral valve regurgitation and decreasing function due to CHF recently. He denies any other significant medical issues. He is quite active usually and states he can walk up a couple of flights of stairs without difficulty. He denies history of VTE, or problems with anesthesia in the past, or bleeding problems. He states that his blood pressure is always high and he does not want to take medicine to lower it.      Review of Systems         10 point ROS was negative      Health Status   Allergies:    Allergic Reactions (Selected)  Severity Not Documented  Narcotics (Severe constipation)  Vicodin (Urinary retention)   Medications:  (Selected)   Prescriptions  Prescribed  Bactrim  mg-160 mg oral tablet: 1 tab(s), PO, BID, # 20 tab(s), 0 Refill(s), Type: Maintenance, Pharmacy: [facility] #[id number], 1 tab(s) po bid,x10 day(s)  aspirin 81 mg oral tablet: See Instructions, Instructions: 1 tab(s) po every third day, # 100 tab(s), 0 Refill(s), Type: Maintenance, OTC (Rx)  traZODone 50 mg oral tablet: 1-2  tab(s), po, hs, PRN: for  "insomnia, # 60 EA, 1 Refill(s), Type: Maintenance, Pharmacy: [facility] #[id number], 1-2  tab(s) po hs,PRN:for insomnia  Documented Medications  Documented  Cozaar 100 mg oral tablet: 1 tab(s) ( 100 mg ), po, daily, 0 Refill(s)  Fish Oil: ( 1,000 mg ), po, cap(s), 0 Refill(s), Type: Maintenance  Metoprolol Succinate  mg oral tablet, extended release: 1 tab(s) ( 100 mg ), PO, Daily, 0 Refill(s)  Saw Palmetto oral capsule: 0 Refill(s), Type: Maintenance  amlodipine 10 mg oral tablet: 1 tab(s), PO, Daily, # 30 tab(s), 0 Refill(s)  furosemide 20 mg oral tablet: 1 tab(s) ( 20 mg ), po, bid, 0 Refill(s)  rosuvastatin 10 mg oral tablet: 1 tab(s) ( 10 mg ), po, hs, 0 Refill(s), Type: Maintenance  Suspended  potassium chloride: ( 10 mEq ), daily, 0 Refill(s), Type: Maintenance   Problem list:    All Problems (Selected)  Barretts Esophagus / ICD-9-.85 / Confirmed  CHF (Congestive Heart Failure) / ICD-9-.0 / Confirmed  Coronary Artery Disease / ICD-9-.00 / Confirmed  HTN (Hypertension) / ICD-9-.9 / Confirmed  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  Comments:  2010 CDT 2:58 PM CDT - Jaxon OLEARY, Olivia  \"ruptured blood vessel\"  HTN (Hypertension) (401.9): Onset in  at 53 years.  Latent tuberculosis (84566805)  Coronary Artery Disease (414.00)  Resolved  Herpes Zoster (053.9): Onset on 2009 at 73 years.  Resolved.   Family History:    Cancer  Brother ()  Comments:  2010 3:29 PM - Jaxon OLEARY, " Renard  Lung  Brother  Comments:  2010 3:29 PM - Renard Coates CMA  Oral  Brother ()  Comments:  2010 3:29 PM - Renard Coates CMA  colon  Brother ()  Leukemia  Sister (Adrianna, )  Lymphoma  Sister  Rheumatoid arthritis  Brother  TB - Tuberculosis  Father ()  Mother ()  HTN - Hypertension  Brother ()  Sister (Adrianna, )  Sister  Brother ()     Procedure history:    Repair of recurrent ventral hernia w/mesh (105227378) on 2012 at 76 Years.  Repair of epigastric hernia (057026765) on 3/15/2012 at 76 Years.  right shoulder rotator cuff repair in the month of 2010 at 74 Years.  Comments:  2010 3:06 PM - RENARD GORMAN  Colonoscopy ([id number]) in the month of 3/2009 at 73 Years.  Comments:  2010 3:02 PM - RENARD GORMAN  Normal  CABG x 2 - Coronary artery bypass grafts x 2 ([id number]) in the month of 2007 at 72 Years.  laser for photocoagulation in the month of 3/2007 at 71 Years.  EGD in the month of 2003 at 67 Years.  Flexible sigmoidoscopy ([id number]) on 10/12/2000 at 65 Years.  Flexible sigmoidoscopy (14149210) on 10/19/1993 at 58 Years.  right shoulder decompression in  at 54 Years.  Wrist Fracture in  at 52 Years.  Comments:  2010 3:07 PM - RENARD GORMAN  Compression fracutre of foot in  at 43 Years.  L ankle compression in  at 40 Years.  Bilateral vasectomy (564841324) in  at 40 Years.  Rotator cuff repair (651000787) in  at 29 Years.  Comments:  2012 10:25 AM - Berhane Krause PA-C.  left shoulder  Hernia repair ([id number]).  Comments:  2010 3:05 PM - RENARD GORMAN  Bilateral, Laparoscopy  vitreoretinal surgery.   Social History:        Alcohol Assessment: Current            Current                     Comments:                      2010 - Renard Coates CMA                     rare      Tobacco Assessment: Denies Tobacco Use      Substance Abuse Assessment: Denies Substance  Abuse      Home and Environment Assessment            Marital status: .        Physical Examination   Vital Signs   3/3/2017 10:03 AM CST Temperature Tympanic 97.8 DegF  LOW    Peripheral Pulse Rate 65 bpm    Systolic Blood Pressure 170 mmHg  HI    Diastolic Blood Pressure 96 mmHg  HI    Mean Arterial Pressure 121 mmHg    BP Site Right arm    BP Method Manual    Oxygen Saturation 98 %      Measurements from flowsheet : Measurements   3/3/2017 10:03 AM CST Height Measured - Standard 68 in    Weight Measured - Standard 189.2 lb    BSA 2.03 m2    Body Mass Index 28.76 kg/m2      Gen - appears well  CV - RRR, systolic murmur noted  Resp: Clear to auscultation b/l. Normal breath sounds without wheezes or crackles. No increased work of breathing.   GI: Normal bowel sounds. Soft, nondistended, nontender.   ENT: External auditory canals clear. TMs normal bilaterally. Normal oropharynx. Moist mucus membranes   Neck - no thyromegaly  Ext - no edema  Neuro - no focal deficits.      Impression and Plan   Pre-op for angioplasty, mitral valve repair  - per pt, he has already had a pre-operative workup including bloodwork and stress testing through his cardiologist. Will obtain these records.  - he has no other singificant medical problems that would preclude the procedure.

## 2022-02-15 NOTE — NURSING NOTE
Comprehensive Intake Entered On:  9/9/2019 11:49 AM CDT    Performed On:  9/9/2019 11:44 AM CDT by Eduardo FERNANDEZ Jennifer               Summary   Chief Complaint :   Pt c/o right knee and left rib pain. Pt fell over the weekend. Did hit head but states he's only here for his knee.    Ht/Wt Measurement Refused by Patient? :   Yes   Systolic Blood Pressure :   122 mmHg   Diastolic Blood Pressure :   80 mmHg   Mean Arterial Pressure :   94 mmHg   Peripheral Pulse Rate :   82 bpm   Temperature Tympanic :   96.8 DegF(Converted to: 36.0 DegC)  (LOW)    Jennifer Manrique - 9/9/2019 11:44 AM CDT   Health Status   Allergies Verified? :   Yes   Medication History Verified? :   Yes   Medical History Verified? :   Yes   Pre-Visit Planning Status :   Completed   Tobacco Use? :   Never smoker   Jennifer Manrique - 9/9/2019 11:44 AM CDT   Meds / Allergies   (As Of: 9/9/2019 11:49:47 AM CDT)   Allergies (Active)   Narcotics  Estimated Onset Date:   Unspecified ; Reactions:   severe constipation ; Created By:   Schoen RN, Alissa; Reaction Status:   Active ; Category:   Drug ; Substance:   Narcotics ; Type:   Allergy ; Updated By:   Schoen RN, Alissa; Reviewed Date:   8/16/2019 9:33 AM CDT      Vicodin  Estimated Onset Date:   <not entered> 2011 ; Reactions:   Urinary retention ; Created By:   Berhane Krause PA-C; Reaction Status:   Active ; Category:   Drug ; Substance:   Vicodin ; Type:   Allergy ; Updated By:   Berhane Krause PA-C; Reviewed Date:   8/16/2019 9:33 AM CDT        Medication List   (As Of: 9/9/2019 11:49:47 AM CDT)   Prescription/Discharge Order    aspirin  :   aspirin ; Status:   Prescribed ; Ordered As Mnemonic:   aspirin 81 mg oral tablet ; Simple Display Line:   162 mg, 2 tab(s), po, daily, 100 tab(s), 0 Refill(s) ; Ordering Provider:   Berhane Krause PA-C; Catalog Code:   aspirin ; Order Dt/Tm:   9/2/2011 3:37:00 PM          ferrous gluconate  :   ferrous gluconate ; Status:   Prescribed ; Ordered As  Mnemonic:   ferrous gluconate 324 mg (37.5 mg elemental iron) oral tablet ; Simple Display Line:   324 mg, 1 tab(s), po, daily, 90 tab(s), 3 Refill(s) ; Ordering Provider:   Berhane Krause PA-C; Catalog Code:   ferrous gluconate ; Order Dt/Tm:   3/19/2018 8:09:39 AM          metoprolol  :   metoprolol ; Status:   Prescribed ; Ordered As Mnemonic:   metoprolol succinate 50 mg oral tablet, extended release ; Simple Display Line:   50 mg, 1 tab(s), PO, Daily, 90 tab(s), 3 Refill(s) ; Ordering Provider:   Berhane Krause PA-C; Catalog Code:   metoprolol ; Order Dt/Tm:   1/15/2019 10:48:42 AM          FLUoxetine  :   FLUoxetine ; Status:   Prescribed ; Ordered As Mnemonic:   FLUoxetine 20 mg oral capsule ; Simple Display Line:   40 mg, 2 cap(s), po, daily, 90 cap(s), 3 Refill(s) ; Ordering Provider:   Dr Danika Esposito; Catalog Code:   FLUoxetine ; Order Dt/Tm:   1/15/2019 10:48:58 AM          finasteride  :   finasteride ; Status:   Prescribed ; Ordered As Mnemonic:   finasteride 5 mg oral tablet ; Simple Display Line:   5 mg, 1 tab(s), PO, Daily, 90 tab(s), 3 Refill(s) ; Ordering Provider:   Berhane Krause PA-C; Catalog Code:   finasteride ; Order Dt/Tm:   1/15/2019 10:49:10 AM          tamsulosin  :   tamsulosin ; Status:   Prescribed ; Ordered As Mnemonic:   tamsulosin 0.4 mg oral capsule ; Simple Display Line:   0.4 mg, 1 cap(s), po, daily, 90 cap(s), 3 Refill(s) ; Ordering Provider:   Berhane Krause PA-C; Catalog Code:   tamsulosin ; Order Dt/Tm:   1/15/2019 10:52:54 AM          azithromycin  :   azithromycin ; Status:   Processing ; Ordered As Mnemonic:   Zithromax Z-Kolby 250 mg oral tablet ; Ordering Provider:   Berhane Krause PA-C; Action Display:   Complete ; Catalog Code:   azithromycin ; Order Dt/Tm:   9/9/2019 11:47:44 AM            Home Meds    acetaminophen  :   acetaminophen ; Status:   Documented ; Ordered As Mnemonic:   acetaminophen 325 mg oral capsule ; Simple Display Line:   325 mg, 1 cap(s),  po, tid, 0 Refill(s) ; Catalog Code:   acetaminophen ; Order Dt/Tm:   1/18/2018 10:13:21 AM          amLODIPine  :   amLODIPine ; Status:   Documented ; Ordered As Mnemonic:   amLODIPine 5 mg oral tablet ; Simple Display Line:   5 mg, 1 tab(s), PO, Daily, 30 tab(s), 0 Refill(s) ; Catalog Code:   amLODIPine ; Order Dt/Tm:   5/18/2018 9:02:55 AM          atorvastatin  :   atorvastatin ; Status:   Documented ; Ordered As Mnemonic:   atorvastatin 40 mg oral tablet ; Simple Display Line:   40 mg, 1 tab(s), Oral, daily, Per Cardio note 8/8/2018, 0 Refill(s) ; Catalog Code:   atorvastatin ; Order Dt/Tm:   8/24/2018 2:27:27 PM          furosemide  :   furosemide ; Status:   Documented ; Ordered As Mnemonic:   furosemide 40 mg oral tablet ; Simple Display Line:   See Instructions, Take 80mg in the AM and 20mg in the afternoon  Per cardio from 7/3/19, 0 Refill(s) ; Catalog Code:   furosemide ; Order Dt/Tm:   7/8/2019 8:34:49 AM          rivaroxaban  :   rivaroxaban ; Status:   Documented ; Ordered As Mnemonic:   Xarelto 15 mg oral tablet ; Simple Display Line:   15 mg, 1 tab(s), Oral, bid, 15 mg bid for 3 weeks then 20 mg daily, 0 Refill(s) ; Catalog Code:   rivaroxaban ; Order Dt/Tm:   8/16/2019 9:19:06 AM          losartan  :   losartan ; Status:   Documented ; Ordered As Mnemonic:   losartan 50 mg oral tablet ; Simple Display Line:   50 mg, 1 tab(s), Oral, daily, 30 tab(s), 0 Refill(s) ; Catalog Code:   losartan ; Order Dt/Tm:   8/16/2019 9:20:37 AM          potassium chloride  :   potassium chloride ; Status:   Documented ; Ordered As Mnemonic:   potassium chloride 20 mEq oral tablet, extended release ; Simple Display Line:   20 mEq, 1 tab(s), Oral, bid, 60 tab(s), 0 Refill(s) ; Catalog Code:   potassium chloride ; Order Dt/Tm:   8/16/2019 9:27:40 AM

## 2022-02-15 NOTE — NURSING NOTE
Comprehensive Intake Entered On:  3/4/2020 2:14 PM CST    Performed On:  3/4/2020 2:03 PM CST by Vishnu Funes CMA               Summary   Chief Complaint :   Pt here for a Post hospital visit for epistaxis.  Pt is c/o increased fatigue   Advance Directive :   Yes   Weight Measured :   185 lb(Converted to: 185 lb 0 oz, 83.91 kg)    Height Measured :   68 in(Converted to: 5 ft 8 in, 172.72 cm)    Body Mass Index :   28.13 kg/m2 (HI)    Body Surface Area :   2 m2   Systolic Blood Pressure :   138 mmHg (HI)    Diastolic Blood Pressure :   82 mmHg (HI)    Mean Arterial Pressure :   101 mmHg   Peripheral Pulse Rate :   80 bpm   Vital Signs Comments :   Pulse Irregular   BP Site :   Right arm   Pulse Site :   Radial artery   Temperature Tympanic :   96.5 DegF(Converted to: 35.8 DegC)  (LOW)    Respiratory Rate :   20 br/min   Oxygen Saturation :   95 %   Vishnu Funes CMA - 3/4/2020 2:03 PM CST   Health Status   Allergies Verified? :   Yes   Medication History Verified? :   Yes   Medical History Verified? :   No   Pre-Visit Planning Status :   Completed   Tobacco Use? :   Never smoker   Vishnu Funes CMA - 3/4/2020 2:03 PM CST   Meds / Allergies   (As Of: 3/4/2020 2:14:52 PM CST)   Allergies (Active)   Narcotics  Estimated Onset Date:   Unspecified ; Reactions:   severe constipation ; Created By:   Schoen RN, Alissa; Reaction Status:   Active ; Category:   Drug ; Substance:   Narcotics ; Type:   Allergy ; Updated By:   Schoen RN, Alissa; Reviewed Date:   3/4/2020 2:14 PM CST      Vicodin  Estimated Onset Date:   <not entered> 2011 ; Reactions:   Urinary retention ; Created By:   Berhane Krause PA-C; Reaction Status:   Active ; Category:   Drug ; Substance:   Vicodin ; Type:   Allergy ; Updated By:   Berhane Krause PA-C; Reviewed Date:   3/4/2020 2:14 PM CST        Medication List   (As Of: 3/4/2020 2:14:52 PM CST)   Prescription/Discharge Order    hydrALAZINE  :   hydrALAZINE ; Status:   Prescribed ; Ordered As  Mnemonic:   hydrALAZINE 10 mg oral tablet ; Simple Display Line:   10 mg, 1 tab(s), Oral, q8 hrs, 90 tab(s), 3 Refill(s) ; Ordering Provider:   Berhane Krause PA-C; Catalog Code:   hydrALAZINE ; Order Dt/Tm:   1/9/2020 11:49:14 AM CST          aspirin  :   aspirin ; Status:   Prescribed ; Ordered As Mnemonic:   aspirin 81 mg oral tablet ; Simple Display Line:   162 mg, 2 tab(s), po, daily, 100 tab(s), 0 Refill(s) ; Ordering Provider:   Berhane Krause PA-C; Catalog Code:   aspirin ; Order Dt/Tm:   9/2/2011 3:37:00 PM CDT          metoprolol  :   metoprolol ; Status:   Prescribed ; Ordered As Mnemonic:   metoprolol succinate 50 mg oral tablet, extended release ; Simple Display Line:   50 mg, 1 tab(s), PO, Daily, 90 tab(s), 3 Refill(s) ; Ordering Provider:   Berhane Krause PA-C; Catalog Code:   metoprolol ; Order Dt/Tm:   1/9/2020 11:49:05 AM CST          warfarin  :   warfarin ; Status:   Prescribed ; Ordered As Mnemonic:   warfarin 2.5 mg oral tablet ; Simple Display Line:   See Instructions, TAKE 1 TABLET BY MOUTH EVERY OTHER DAY ALTERNATE WITH TAKE 0.5 TABLET EVERY OTHER DAY, 135 tab(s), 1 Refill(s) ; Ordering Provider:   Berhane Krause PA-C; Catalog Code:   warfarin ; Order Dt/Tm:   12/10/2019 8:14:15 PM CST          tamsulosin 0.4 mg oral capsule  :   tamsulosin 0.4 mg oral capsule ; Status:   Prescribed ; Ordered As Mnemonic:   tamsulosin 0.4 mg oral capsule ; Simple Display Line:   1 cap(s), Oral, daily, 270 unknown unit ; Ordering Provider:   Berhane Krause PA-C; Catalog Code:   tamsulosin ; Order Dt/Tm:   10/2/2019 1:08:15 PM CDT          finasteride  :   finasteride ; Status:   Prescribed ; Ordered As Mnemonic:   finasteride 5 mg oral tablet ; Simple Display Line:   5 mg, 1 tab(s), PO, Daily, 90 tab(s), 3 Refill(s) ; Ordering Provider:   Berhane Krause PA-C; Catalog Code:   finasteride ; Order Dt/Tm:   1/15/2019 10:49:10 AM CST          FLUoxetine  :   FLUoxetine ; Status:   Prescribed ; Ordered As  Mnemonic:   FLUoxetine 20 mg oral capsule ; Simple Display Line:   40 mg, 2 cap(s), po, daily, 90 cap(s), 3 Refill(s) ; Ordering Provider:   Dr Danika Esposito; Catalog Code:   FLUoxetine ; Order Dt/Tm:   1/15/2019 10:48:58 AM CST          ferrous gluconate  :   ferrous gluconate ; Status:   Prescribed ; Ordered As Mnemonic:   ferrous gluconate 324 mg (37.5 mg elemental iron) oral tablet ; Simple Display Line:   324 mg, 1 tab(s), po, daily, 90 tab(s), 3 Refill(s) ; Ordering Provider:   Berhane Krause PA-C; Catalog Code:   ferrous gluconate ; Order Dt/Tm:   3/19/2018 8:09:39 AM CDT            Home Meds    clopidogrel  :   clopidogrel ; Status:   Processing ; Ordered As Mnemonic:   clopidogrel 75 mg oral tablet ; Action Display:   Complete ; Catalog Code:   clopidogrel ; Order Dt/Tm:   3/4/2020 2:06:08 PM CST          isosorbide dinitrate  :   isosorbide dinitrate ; Status:   Documented ; Ordered As Mnemonic:   isosorbide dinitrate 10 mg oral tablet ; Simple Display Line:   10 mg, 1 tab(s), Oral, bid, 120 tab(s), 0 Refill(s) ; Catalog Code:   isosorbide dinitrate ; Order Dt/Tm:   1/8/2020 1:25:52 PM CST          bumetanide  :   bumetanide ; Status:   Documented ; Ordered As Mnemonic:   bumetanide 2 mg oral tablet ; Simple Display Line:   2 mg, 1 tab(s), Oral, daily, 30 tab(s), 0 Refill(s) ; Catalog Code:   bumetanide ; Order Dt/Tm:   1/8/2020 1:22:15 PM CST          docusate  :   docusate ; Status:   Documented ; Ordered As Mnemonic:   docusate sodium 100 mg oral capsule ; Simple Display Line:   100 mg, 1 cap(s), Oral, qhs, PRN: for constipation, 20 cap(s), 0 Refill(s) ; Catalog Code:   docusate ; Order Dt/Tm:   1/8/2020 1:22:57 PM CST          clopidogrel  :   clopidogrel ; Status:   Documented ; Ordered As Mnemonic:   clopidogrel 75 mg oral tablet ; Simple Display Line:   75 mg, 1 tab(s), Oral, daily, 30 tab(s), 0 Refill(s) ; Catalog Code:   clopidogrel ; Order Dt/Tm:   1/8/2020 1:21:13 PM CST           atorvastatin  :   atorvastatin ; Status:   Documented ; Ordered As Mnemonic:   atorvastatin 80 mg oral tablet ; Simple Display Line:   80 mg, 1 tab(s), Oral, daily, 30 tab(s), 0 Refill(s) ; Catalog Code:   atorvastatin ; Order Dt/Tm:   1/8/2020 1:21:50 PM CST          potassium chloride  :   potassium chloride ; Status:   Documented ; Ordered As Mnemonic:   potassium chloride 20 mEq oral tablet, extended release ; Simple Display Line:   20 mEq, 1 tab(s), Oral, bid, 60 tab(s), 0 Refill(s) ; Catalog Code:   potassium chloride ; Order Dt/Tm:   8/16/2019 9:27:40 AM CDT          losartan  :   losartan ; Status:   Documented ; Ordered As Mnemonic:   losartan 50 mg oral tablet ; Simple Display Line:   50 mg, 1 tab(s), Oral, daily, 30 tab(s), 0 Refill(s) ; Catalog Code:   losartan ; Order Dt/Tm:   8/16/2019 9:20:37 AM CDT          acetaminophen  :   acetaminophen ; Status:   Documented ; Ordered As Mnemonic:   acetaminophen 325 mg oral capsule ; Simple Display Line:   325 mg, 1 cap(s), po, tid, 0 Refill(s) ; Catalog Code:   acetaminophen ; Order Dt/Tm:   1/18/2018 10:13:21 AM CST            Social History   Social History   (As Of: 3/4/2020 2:14:52 PM CST)   Alcohol:  Current      Current   Comments:  8/18/2010 2:59 PM - Olivia Coates CMA: rare   (Last Updated: 8/18/2010 2:59:42 PM CDT by Olivia Coates CMA)          Tobacco:  Denies Tobacco Use      (Last Updated: 8/18/2010 2:59:53 PM CDT by Olivia Coates CMA )         Substance Abuse:  Denies Substance Abuse      (Last Updated: 3/14/2012 3:14:16 PM CDT by Adrian Argueta MD )         Home/Environment:        Marital status: .   (Last Updated: 9/5/2017 2:18:47 PM CDT by Claudia Larson MA)

## 2022-02-15 NOTE — PROGRESS NOTES
Patient:   PUMA BAEZ            MRN: [id number]            FIN: [id number]               Age:   82 years     Sex:  Male     :  1935   Associated Diagnoses:   Embolic cerebral infarction; Rectal bleeding   Author:   Berhane Krause PA-C      Visit Information   Accompanied by:  daughter.       Chief Complaint   3/29/2018 2:22 PM CDT    Pt here for dark colored blood in stool that started yesterday.  Pt also states his teeth/gums are bleeding  when he brushes his teeth and that started 1-2 days ago.  Pt states he hasnt taken any of his medication today.      History of Present Illness   Chief complaint and symptoms noted above and confirmed with patient   as above,  had mitral valve clip procedure in Dec and then had stroke  he is on warfarin  he is going to PT and that is going well, his leg edema is improving    noticed darkened stool yesterday, teeth and gums are also bleeding      Review of Systems   Constitutional:  Fatigue.    Gastrointestinal:  Melena.         Rectal bleeding: Dark.       Health Status   Allergies:    Allergic Reactions (Selected)  Severity Not Documented  Narcotics (Severe constipation)  Vicodin (Urinary retention)   Medications:  (Selected)   Prescriptions  Prescribed  FLUoxetine 20 mg oral capsule: 1 cap(s) ( 20 mg ), po, daily, # 90 cap(s), 3 Refill(s), Type: Maintenance  aspirin 81 mg oral tablet: See Instructions, Instructions: 1 tab(s) po every third day, # 100 tab(s), 0 Refill(s), Type: Maintenance, OTC (Rx)  atorvastatin 40 mg oral tablet: 1 tab(s) ( 40 mg ), PO, Daily, # 90 tab(s), 3 Refill(s), Type: Maintenance  famotidine 20 mg oral tablet: 1 tab(s) ( 20 mg ), po, daily, # 90 tab(s), 3 Refill(s), Type: Maintenance  ferrous gluconate 324 mg (37.5 mg elemental iron) oral tablet: 1 tab(s) ( 324 mg ), po, daily, # 90 tab(s), 3 Refill(s), Type: Maintenance  finasteride 5 mg oral tablet: 1 tab(s) ( 5 mg ), PO, Daily, # 90 tab(s), 3 Refill(s), Type: Maintenance  furosemide  "40 mg oral tablet: 1 tab(s) ( 40 mg ), PO, bid, # 60 tab(s), 1 Refill(s), Type: Maintenance  gabapentin 300 mg oral capsule: 1 cap(s) ( 300 mg ), po, qhs, # 90 cap(s), 3 Refill(s), Type: Maintenance  losartan 25 mg oral tablet: 1 tab(s) ( 25 mg ), PO, Daily, # 90 tab(s), 3 Refill(s), Type: Maintenance  metoprolol succinate 50 mg oral tablet, extended release: 1 tab(s) ( 50 mg ), PO, Daily, # 90 tab(s), 3 Refill(s), Type: Maintenance  tamsulosin 0.4 mg oral capsule: 1 cap(s) ( 0.4 mg ), po, daily, # 90 cap(s), 3 Refill(s), Type: Maintenance  warfarin 3 mg oral tablet: 1 tab(s) ( 3 mg ), PO, Daily, # 90 tab(s), 1 Refill(s), Type: Maintenance  Documented Medications  Documented  Fish Oil: ( 1,000 mg ), po, cap(s), 0 Refill(s), Type: Maintenance  acetaminophen 325 mg oral capsule: 1 cap(s) ( 325 mg ), po, tid, 0 Refill(s), Type: Maintenance  clotrimazole 1% topical cream: 1 annmarie, top, bid, 0 Refill(s), Type: Maintenance  melatonin 3 mg oral tablet: 1 tab(s) ( 3 mg ), po, daily, 0 Refill(s), Type: Maintenance  polyethylene glycol 3350 oral powder for reconstitution: ( 17 gm ), po, daily, 0 Refill(s), Type: Maintenance  Suspended  potassium chloride: ( 10 mEq ), daily, 0 Refill(s), Type: Maintenance      Histories   Past Medical History:    Active  Hematoma (1894322325): Onset on 12/15/2017 at 82 years.  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  Comments:  8/18/2010 CDT 2:58 PM CDT - Olivia Coates CMA  \"ruptured blood vessel\"  HTN (hypertension) (3218555545): Onset in 1989 at 54 years.  Latent tuberculosis (96922936)  CAD (coronary artery disease) (41945475)  Acute " posthemorrhagic anemia (169800299)  Hyperlipidemia (15482218)  Epistaxis (595390417)  Comments:  2018 CST 10:33 AM Thuy Topete  Chronic  Abnormal glucose (754536990)  Foot pain (322099515)  Nonrheumatic mitral valve regurgitation (769955030)  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 (6645143611)  Hematuria (160968144)  History of deep venous thrombosis (2039258726)  Comments:  2018 CST 9:57 AM Thuy Topete  Right lower extremity.  Iliac vein.  Anemia, blood loss (6026414631)  Tuberculosis (28901786)  Comments:  2018 CST 10:02 AM Thuy Topete  Patient tests positive.  See family history.  Bilateral inguinal hernia (029741345)  Hernia, ventral (8720345625)  Hyperplasia of prostate (611394142)  Comments:  2018 CST 10:24 AM Thuy Topete  Benign  Insomnia (110659127)  Pain of right thumb (422656233)  Comments:  2018 CST 10:36 AM Thuy Topete  Chronic  Chronic alcohol use (751530608)  Esophageal reflux (600915591)  Lumbar spondylosis (839056515)  Resolved  Inpatient stay (712725110): Onset on 2017 at 82 years.  Resolved on 2018 at 82 years.  Comments:  2018 CST 1:09 PM Blossom Art  @[facility], [place], [place] - Embolic cerebral infarction    2018 CST 1:14 PM Blossom Art  [facility]  H/O herpes zoster (5285437089): Onset on 2009 at 73 years.  Resolved.  Age-related cataract of both eyes (235358274):  Resolved.  Acute kidney injury (93929765):  Resolved.  History of fracture of right ankle (0822279543):  Resolved.  Comments:  2018 CST 10:06 AM Thuy Topete  Right  Burst blood vessel in eye (070563732):  Resolved.  Comments:  2018 CST 10:05 AM Thuy Topete  Left  H/O fracture of wrist (4198396822):  Resolved.  Comments:  2018 CST 10:08 AM CST - Thuy Hopkins   Family History:    Cancer  Brother ()  Comments:  2010 3:29 PM -  Renard Coates CMA  Lung  Brother  Comments:  2010 3:29 PM - JaxonRenard marr CMA  Oral  Brother ()  Comments:  2010 3:29 PM - Renard Coates CMA  colon  Brother ()  Leukemia  Sister (Adrianna, )  Lymphoma  Sister  Rheumatoid arthritis  Brother  TB - Tuberculosis  Father ()  Mother ()  HTN - Hypertension  Brother ()  Sister (Adrianna, )  Sister  Brother ()     Procedure history:    History of inferior vena cava filter placement (8577598042) on 12/10/2017 at 82 Years.  Mitral valve clip (3970263861) on 2017 at 82 Years.  Comments:  2018 1:16 PM - Blossom Varela  Complicated by air embolization and intermedius vein graft  PCI - Percutaneous coronary intervention (1334808491) on 2017 at 82 Years.  Comments:  2018 1:18 PM - Blossom Varela  via left femoral artery with coronary angiogram.  Intraaortic balloon pump placed and later removed.  Coronary angiogram (46400645) on 2017 at 81 Years.  Extracapsular cataract extraction and insertion of intraocular lens (986545377) on 2017 at 81 Years.  Comments:  2017 12:37 PM - Dalila Rondon  Right.  Extracapsular cataract extraction and insertion of intraocular lens (675234379) on 2017 at 81 Years.  Comments:  2017 1:44 PM - Dalila Rondon  Left.  Coronary angiogram (95799787) on 3/6/2017 at 81 Years.  Repair of recurrent ventral hernia w/mesh (775048606) on 2012 at 76 Years.  Repair of epigastric hernia (186271102) on 3/15/2012 at 76 Years.  right shoulder rotator cuff repair in the month of 2010 at 74 Years.  Comments:  2010 3:06 PM - RENARD GORMAN  R  Colonoscopy (918992667) in the month of 3/2009 at 73 Years.  Comments:  2010 3:02 PM - RENARD GORMAN  Normal  CABG x 2 - Coronary artery bypass grafts x 2 (442464784) in the month of 2007 at 72 Years.  laser for photocoagulation in the month of 3/2007 at 71 Years.  EGD in the month of 2003 at 67  Years.  Flexible sigmoidoscopy (55951332) on 10/12/2000 at 65 Years.  Flexible sigmoidoscopy (03463894) on 10/19/1993 at 58 Years.  right shoulder decompression in 1989 at 54 Years.  Wrist Fracture in 1987 at 52 Years.  Comments:  8/18/2010 3:07 PM - RENARD GORMAN  R  Compression fracutre of foot in 1978 at 43 Years.  L ankle compression in 1975 at 40 Years.  Bilateral vasectomy (353249477) in 1975 at 40 Years.  Back (949242840) in 1975 at 40 Years.  Comments:  1/18/2018 10:26 AM - Thuy Hopkins  Lumbar and thoracic.    1/18/2018 10:23 AM - Thuy Hopkins  History of spine surgery.  Rotator cuff repair (583383576) in 1964 at 29 Years.  Comments:  2/24/2012 10:25 AM - Berhane Krause PA-C.  left shoulder  Hernia repair (93269436).  Comments:  8/18/2010 3:05 PM - RENARD GORMAN  Bilateral, Laparoscopy  vitreoretinal surgery.  ORIF - Open reduction of fracture of ankle with internal fixation ([id number]).  Comments:  1/18/2018 10:11 AM - Thuy Hopkins  Right      Physical Examination   Vital Signs   3/29/2018 2:22 PM CDT Temperature Tympanic 98.8 DegF    Peripheral Pulse Rate 72 bpm    Pulse Site Radial artery    Respiratory Rate 16 br/min    Systolic Blood Pressure 170 mmHg  HI    Diastolic Blood Pressure 100 mmHg  HI    Mean Arterial Pressure 123 mmHg    BP Site Right arm    Oxygen Saturation 97 %      Measurements from flowsheet : Measurements   3/29/2018 2:22 PM CDT Height Measured - Standard 68 in    Weight Measured - Standard 196 lb    BSA 2.06 m2    Body Mass Index 29.8 kg/m2  HI      General:  No acute distress.    Respiratory:  Lungs are clear to auscultation.    Cardiovascular:  Normal rate, Regular rhythm, grade 3/6 holosystolic murmur.    Gastrointestinal:  Soft, Non-tender, Non-distended.    Genitourinary:  on rectal exam there is no anal fissure, hemoccult is negative.    Psychiatric:  Appropriate mood & affect.       Review / Management   Results review:       Interpretation: INR today is 2.2, Hgb is 12.4.     Documentation reviewed:       Case discussed with: Solitario Francisco MD.       Impression and Plan   Diagnosis     Embolic cerebral infarction (EZF24-WL I63.40).     Rectal bleeding (SXV45-TH K62.5).     Summary:  he does not do very well on any blood thinner, and tends  to bleed easily  he is having some GI bleeding despite INR within normal range  will stop his warfarin and switch to 162 mg ASA daily (2 baby aspirins daily)  he will follow up in 2 weeks.    Orders     Orders   Lab (Gen Lab  Reference Lab):  POC Prothrombin Time with INR* ([facility]) (Order): Specimen Type: Fingerstick, Collection Date: 3/29/2018 2:46 PM CDT  Requests (Lab):  Hemoglobin (Request) (Order): Priority: Urgent, Rectal bleeding  Embolic cerebral infarction.     Orders   Charges (Evaluation and Management):  [id number] office outpatient visit 15 minutes (Charge) (Order): Quantity: 1, Rectal bleeding  Embolic cerebral infarction.     Orders   Requests (Return to Office):  Return to Office (Request) (Order): RFV: 2 weeks for clinic visit and recheck.

## 2022-02-15 NOTE — TELEPHONE ENCOUNTER
---------------------  From: Rachel Garza LPN   To: Dontrell Wheeler MD; Phone Messages Pool ([id number] - [place]);     Sent: 3/27/2020 1:49:56 PM CDT  Subject: hospice     PCP:   Nadya ANGLIN     Time of Call:  _  1336       Person Calling:  Nadya Meyer   Phone number:  _ [phone]      Note:   _ [facility] calling to request a verbal order to place patient on hospice care.     please advise, patient went to ED 3----------------------  From: Dontrell Wheeler MD   To: Rachel Garza LPN; Berhane Krause PA-C;     Sent: 3/27/2020 2:13:01 PM CDT  Subject: RE: hospice     What do you think Berhane?---------------------  From: Berhane Krause PA-C   To: Rachel Garza LPN; Dontrell Wheeler MD;     Sent: 3/27/2020 2:27:29 PM CDT  Subject: RE: hospice     I think it is appropriate, he has acute on chronic CHF along with hx of CVA and is slowly getting worse  he was in the ER on 3/25, given additional bumex,  per ER note patient did not want to be admitted to the hospital  so I think hospice is reasonable.---------------------  From: Dontrell Wheeler MD   To: Berhane Krause PA-C; Rachel Garza LPN;     Sent: 3/27/2020 2:29:59 PM CDT  Subject: RE: hospice     OKCalled Gee and gave OK for hospice per JDL. Gee says she sent over standing orders to be signed. She says they were hoping to admit to hospice today but pt ended up back in the hospital.---------------------  From: Berhane Krause PA-C   To: BadKelsy monge , Cat;     Sent: 3/30/2020 8:42:41 AM CDT  Subject: FW: hospice     CatLELAND Ken is going into hospice.  ---------------------  From: Kelsy Glez , Cat   To: Berhane Krause PA-C;     Sent: 3/30/2020 4:38:17 PM CDT  Subject: RE: hospice     Thanks for letting me know.  Depending what his hospice diagnosis is - he may lose coverage for various medications.  Loop me in as that occurs if needed.  KB

## 2023-12-12 NOTE — PROGRESS NOTES
"Patient and his daughter seen in clinic for HF education s/p recent hospital discharge 10/20/19.  Reviewed HF Binder that includes the  HF Sx Awareness & Action plan  handout and  A Stronger Pump  booklet and Weight log booklet highlighting :  __X_patient s type of heart failure _X__Na management in diet  __X_importance of daily wts  _X__Fluid Guidelines, if applicable  __X_medication review and importance of compliance     Instructed patient in signs and sx of heart failure, reiterated when to call clinic - reviewed HF hotline # [phone] and after hours call # [phone].  Majority of time was spent reviewing: signs and symptoms of when to call the heart failure line. He reports being \"a stubborn old [place]\" and does not like to listen to rules. However; he is making an effort to avoid salty foods. He was applauded for this.  Patient verbalized understanding of HF discussion.  Plan for f/u with continued HF education reviewed.  " Detail Level: Simple Detail Level: Detailed Detail Level: Generalized